# Patient Record
Sex: MALE | Race: WHITE | NOT HISPANIC OR LATINO | Employment: UNEMPLOYED | URBAN - METROPOLITAN AREA
[De-identification: names, ages, dates, MRNs, and addresses within clinical notes are randomized per-mention and may not be internally consistent; named-entity substitution may affect disease eponyms.]

---

## 2017-08-03 ENCOUNTER — DOCUMENTATION ONLY (OUTPATIENT)
Dept: FAMILY MEDICINE | Facility: CLINIC | Age: 62
End: 2017-08-03

## 2017-08-03 NOTE — PROGRESS NOTES
Pre-Visit Chart Review  For Appointment Scheduled on 08/04/2017      Health Maintenance Due   Topic Date Due    Zoster Vaccine  12/10/2013    Colonoscopy  01/28/2016    Lipid Panel  02/12/2017    Influenza Vaccine  08/01/2017

## 2017-08-04 ENCOUNTER — LAB VISIT (OUTPATIENT)
Dept: LAB | Facility: HOSPITAL | Age: 62
End: 2017-08-04
Attending: PHYSICIAN ASSISTANT
Payer: MEDICARE

## 2017-08-04 ENCOUNTER — OFFICE VISIT (OUTPATIENT)
Dept: FAMILY MEDICINE | Facility: CLINIC | Age: 62
End: 2017-08-04
Payer: MEDICARE

## 2017-08-04 VITALS
BODY MASS INDEX: 27.97 KG/M2 | WEIGHT: 199.75 LBS | TEMPERATURE: 98 F | SYSTOLIC BLOOD PRESSURE: 114 MMHG | HEIGHT: 71 IN | DIASTOLIC BLOOD PRESSURE: 70 MMHG | HEART RATE: 65 BPM

## 2017-08-04 DIAGNOSIS — M79.605 LEG PAIN, BILATERAL: ICD-10-CM

## 2017-08-04 DIAGNOSIS — Z12.5 PROSTATE CANCER SCREENING: ICD-10-CM

## 2017-08-04 DIAGNOSIS — Z95.5 S/P CORONARY ARTERY STENT PLACEMENT: ICD-10-CM

## 2017-08-04 DIAGNOSIS — M79.604 LEG PAIN, BILATERAL: ICD-10-CM

## 2017-08-04 DIAGNOSIS — I25.10 CORONARY ARTERY DISEASE INVOLVING NATIVE CORONARY ARTERY WITHOUT ANGINA PECTORIS, UNSPECIFIED WHETHER NATIVE OR TRANSPLANTED HEART: ICD-10-CM

## 2017-08-04 DIAGNOSIS — E78.00 HYPERCHOLESTEROLEMIA: ICD-10-CM

## 2017-08-04 DIAGNOSIS — E78.5 HYPERLIPIDEMIA, UNSPECIFIED HYPERLIPIDEMIA TYPE: Primary | ICD-10-CM

## 2017-08-04 DIAGNOSIS — M79.606 PAIN OF LOWER EXTREMITY, UNSPECIFIED LATERALITY: ICD-10-CM

## 2017-08-04 DIAGNOSIS — E78.5 HYPERLIPIDEMIA, UNSPECIFIED HYPERLIPIDEMIA TYPE: ICD-10-CM

## 2017-08-04 DIAGNOSIS — I10 ESSENTIAL HYPERTENSION: ICD-10-CM

## 2017-08-04 DIAGNOSIS — I73.9 PAD (PERIPHERAL ARTERY DISEASE): ICD-10-CM

## 2017-08-04 DIAGNOSIS — F17.200 TOBACCO DEPENDENCE: ICD-10-CM

## 2017-08-04 LAB
ALBUMIN SERPL BCP-MCNC: 3.2 G/DL
ALP SERPL-CCNC: 98 U/L
ALT SERPL W/O P-5'-P-CCNC: <5 U/L
ANION GAP SERPL CALC-SCNC: 9 MMOL/L
AST SERPL-CCNC: 13 U/L
BASOPHILS # BLD AUTO: 0.03 K/UL
BASOPHILS NFR BLD: 0.5 %
BILIRUB SERPL-MCNC: 0.3 MG/DL
BUN SERPL-MCNC: 9 MG/DL
CALCIUM SERPL-MCNC: 9.7 MG/DL
CHLORIDE SERPL-SCNC: 103 MMOL/L
CHOLEST/HDLC SERPL: 5.8 {RATIO}
CO2 SERPL-SCNC: 27 MMOL/L
COMPLEXED PSA SERPL-MCNC: 0.91 NG/ML
CREAT SERPL-MCNC: 0.8 MG/DL
DIFFERENTIAL METHOD: ABNORMAL
EOSINOPHIL # BLD AUTO: 0.1 K/UL
EOSINOPHIL NFR BLD: 1.7 %
ERYTHROCYTE [DISTWIDTH] IN BLOOD BY AUTOMATED COUNT: 13.9 %
EST. GFR  (AFRICAN AMERICAN): >60 ML/MIN/1.73 M^2
EST. GFR  (NON AFRICAN AMERICAN): >60 ML/MIN/1.73 M^2
GLUCOSE SERPL-MCNC: 87 MG/DL
HCT VFR BLD AUTO: 42.7 %
HDL/CHOLESTEROL RATIO: 17.1 %
HDLC SERPL-MCNC: 210 MG/DL
HDLC SERPL-MCNC: 36 MG/DL
HGB BLD-MCNC: 14.1 G/DL
LDLC SERPL CALC-MCNC: 138 MG/DL
LYMPHOCYTES # BLD AUTO: 1.5 K/UL
LYMPHOCYTES NFR BLD: 22.9 %
MCH RBC QN AUTO: 31.8 PG
MCHC RBC AUTO-ENTMCNC: 33 G/DL
MCV RBC AUTO: 96 FL
MONOCYTES # BLD AUTO: 0.6 K/UL
MONOCYTES NFR BLD: 9.1 %
NEUTROPHILS # BLD AUTO: 4.3 K/UL
NEUTROPHILS NFR BLD: 65.5 %
NONHDLC SERPL-MCNC: 174 MG/DL
PLATELET # BLD AUTO: 213 K/UL
PMV BLD AUTO: 10.2 FL
POTASSIUM SERPL-SCNC: 4.2 MMOL/L
PROT SERPL-MCNC: 7.8 G/DL
RBC # BLD AUTO: 4.44 M/UL
SODIUM SERPL-SCNC: 139 MMOL/L
TRIGL SERPL-MCNC: 180 MG/DL
WBC # BLD AUTO: 6.5 K/UL

## 2017-08-04 PROCEDURE — 3008F BODY MASS INDEX DOCD: CPT | Mod: ,,, | Performed by: PHYSICIAN ASSISTANT

## 2017-08-04 PROCEDURE — 36415 COLL VENOUS BLD VENIPUNCTURE: CPT | Mod: PO

## 2017-08-04 PROCEDURE — 84153 ASSAY OF PSA TOTAL: CPT

## 2017-08-04 PROCEDURE — 85025 COMPLETE CBC W/AUTO DIFF WBC: CPT

## 2017-08-04 PROCEDURE — 99214 OFFICE O/P EST MOD 30 MIN: CPT | Mod: S$PBB,,, | Performed by: PHYSICIAN ASSISTANT

## 2017-08-04 PROCEDURE — 80053 COMPREHEN METABOLIC PANEL: CPT

## 2017-08-04 PROCEDURE — 99999 PR PBB SHADOW E&M-EST. PATIENT-LVL III: CPT | Mod: PBBFAC,,, | Performed by: PHYSICIAN ASSISTANT

## 2017-08-04 PROCEDURE — 80061 LIPID PANEL: CPT

## 2017-08-04 RX ORDER — ASPIRIN 81 MG/1
81 TABLET ORAL DAILY
COMMUNITY

## 2017-08-04 RX ORDER — METOPROLOL TARTRATE 50 MG/1
50 TABLET ORAL 2 TIMES DAILY
Qty: 180 TABLET | Refills: 2 | Status: SHIPPED | OUTPATIENT
Start: 2017-08-04 | End: 2018-04-02 | Stop reason: HOSPADM

## 2017-08-04 RX ORDER — CLOPIDOGREL BISULFATE 75 MG/1
75 TABLET ORAL DAILY
Qty: 30 TABLET | Refills: 6 | Status: SHIPPED | OUTPATIENT
Start: 2017-08-04 | End: 2019-01-09 | Stop reason: SDUPTHER

## 2017-08-04 RX ORDER — LOVASTATIN 20 MG/1
20 TABLET ORAL NIGHTLY
Qty: 90 TABLET | Refills: 3 | Status: SHIPPED | OUTPATIENT
Start: 2017-08-04 | End: 2018-04-02 | Stop reason: ALTCHOICE

## 2017-08-04 NOTE — PROGRESS NOTES
Subjective:       Patient ID: Srinivasa Oliver is a 61 y.o. male.    Chief Complaint: Annual Exam and Medication Refill    Patient is here for annual exam and labs.  Patient is having trouble affording medications and has not been taking most of his medication or has been sharing with his wife.        Review of Systems   Constitutional: Negative for activity change, appetite change, chills, fatigue, fever and unexpected weight change.   HENT: Negative.    Eyes: Negative for photophobia, pain, discharge, redness and visual disturbance.   Respiratory: Negative for cough, chest tightness and shortness of breath.    Cardiovascular: Negative for chest pain, palpitations and leg swelling.   Gastrointestinal: Negative for abdominal distention, abdominal pain, blood in stool, constipation, diarrhea, nausea and vomiting.   Genitourinary: Negative for decreased urine volume, difficulty urinating, dysuria, frequency, hematuria and urgency.   Musculoskeletal: Negative for arthralgias, back pain, gait problem and myalgias.   Neurological: Negative for dizziness, weakness, light-headedness, numbness and headaches.       Objective:      Physical Exam   Constitutional: He is oriented to person, place, and time. He appears well-developed and well-nourished. No distress.   HENT:   Head: Normocephalic and atraumatic.   Eyes: Conjunctivae are normal. Pupils are equal, round, and reactive to light.   Neck: Normal range of motion. Neck supple. No JVD present. No thyromegaly present.   Cardiovascular: Normal rate and regular rhythm.  Exam reveals no gallop and no friction rub.    No murmur heard.  Pulmonary/Chest: Effort normal. No respiratory distress. He has no wheezes. He has no rales. He exhibits no tenderness.   Neurological: He is alert and oriented to person, place, and time.   Skin: He is not diaphoretic.       Assessment:       1. Hyperlipidemia, unspecified hyperlipidemia type    2. Essential hypertension    3. PAD (peripheral  artery disease)    4. Prostate cancer screening    5. Coronary artery disease involving native coronary artery without angina pectoris, unspecified whether native or transplanted heart    6. S/P coronary artery stent placement    7. Hypercholesterolemia    8. Tobacco dependence    9. Leg pain, bilateral    10. Pain of lower extremity, unspecified laterality        Plan:       Srinivasa was seen today for annual exam and medication refill.    Diagnoses and all orders for this visit:    Hyperlipidemia, unspecified hyperlipidemia type  -     Lipid panel; Future  -     lovastatin (MEVACOR) 20 MG tablet; Take 1 tablet (20 mg total) by mouth every evening.    Essential hypertension  -     CBC auto differential; Future  -     Comprehensive metabolic panel; Future  -     metoprolol tartrate (LOPRESSOR) 50 MG tablet; Take 1 tablet (50 mg total) by mouth 2 (two) times daily.  -     clopidogrel (PLAVIX) 75 mg tablet; Take 1 tablet (75 mg total) by mouth once daily.    PAD (peripheral artery disease)  -     clopidogrel (PLAVIX) 75 mg tablet; Take 1 tablet (75 mg total) by mouth once daily.    Prostate cancer screening  -     PSA, Screening; Future    Coronary artery disease involving native coronary artery without angina pectoris, unspecified whether native or transplanted heart  -     clopidogrel (PLAVIX) 75 mg tablet; Take 1 tablet (75 mg total) by mouth once daily.    S/P coronary artery stent placement  -     clopidogrel (PLAVIX) 75 mg tablet; Take 1 tablet (75 mg total) by mouth once daily.    Hypercholesterolemia  -     clopidogrel (PLAVIX) 75 mg tablet; Take 1 tablet (75 mg total) by mouth once daily.    Tobacco dependence  -     clopidogrel (PLAVIX) 75 mg tablet; Take 1 tablet (75 mg total) by mouth once daily.    Leg pain, bilateral  -     clopidogrel (PLAVIX) 75 mg tablet; Take 1 tablet (75 mg total) by mouth once daily.    Pain of lower extremity, unspecified laterality  -     clopidogrel (PLAVIX) 75 mg tablet; Take 1  tablet (75 mg total) by mouth once daily.    Patient readiness: acceptance and barriers:economic    During the course of the visit the patient was educated and counseled about the following:     Hypertension:   Dietary sodium restriction.    Goals: Hypertension: Reduce Blood Pressure    Did patient meet goals/outcomes: Yes    The following self management tools provided: declined    Patient Instructions (the written plan) was given to the patient/family.     Time spent with patient: 45 minutes

## 2017-08-07 RX ORDER — ESCITALOPRAM OXALATE 10 MG/1
10 TABLET ORAL DAILY
Qty: 90 TABLET | Refills: 3 | Status: SHIPPED | OUTPATIENT
Start: 2017-08-07 | End: 2019-01-09 | Stop reason: SDUPTHER

## 2018-01-30 DIAGNOSIS — I10 HYPERTENSION, UNSPECIFIED TYPE: Primary | ICD-10-CM

## 2018-04-02 ENCOUNTER — OFFICE VISIT (OUTPATIENT)
Dept: CARDIOLOGY | Facility: CLINIC | Age: 63
End: 2018-04-02
Payer: MEDICARE

## 2018-04-02 VITALS
WEIGHT: 205.25 LBS | BODY MASS INDEX: 28.73 KG/M2 | OXYGEN SATURATION: 91 % | SYSTOLIC BLOOD PRESSURE: 114 MMHG | DIASTOLIC BLOOD PRESSURE: 67 MMHG | RESPIRATION RATE: 16 BRPM | HEART RATE: 68 BPM | HEIGHT: 71 IN

## 2018-04-02 DIAGNOSIS — F17.200 TOBACCO DEPENDENCE: ICD-10-CM

## 2018-04-02 DIAGNOSIS — I73.9 PAD (PERIPHERAL ARTERY DISEASE): ICD-10-CM

## 2018-04-02 DIAGNOSIS — I10 HYPERTENSION, UNSPECIFIED TYPE: ICD-10-CM

## 2018-04-02 DIAGNOSIS — Z72.0 TOBACCO ABUSE: Primary | ICD-10-CM

## 2018-04-02 DIAGNOSIS — I25.10 CORONARY ARTERY DISEASE, ANGINA PRESENCE UNSPECIFIED, UNSPECIFIED VESSEL OR LESION TYPE, UNSPECIFIED WHETHER NATIVE OR TRANSPLANTED HEART: ICD-10-CM

## 2018-04-02 PROCEDURE — 99214 OFFICE O/P EST MOD 30 MIN: CPT | Mod: PBBFAC,PO,25 | Performed by: INTERNAL MEDICINE

## 2018-04-02 PROCEDURE — 93005 ELECTROCARDIOGRAM TRACING: CPT | Mod: PBBFAC,PO | Performed by: INTERNAL MEDICINE

## 2018-04-02 PROCEDURE — 99999 PR PBB SHADOW E&M-EST. PATIENT-LVL IV: CPT | Mod: PBBFAC,,, | Performed by: INTERNAL MEDICINE

## 2018-04-02 PROCEDURE — 99214 OFFICE O/P EST MOD 30 MIN: CPT | Mod: S$PBB,,, | Performed by: INTERNAL MEDICINE

## 2018-04-02 RX ORDER — METOPROLOL SUCCINATE 50 MG/1
50 TABLET, EXTENDED RELEASE ORAL DAILY
Qty: 90 TABLET | Refills: 11 | Status: SHIPPED | OUTPATIENT
Start: 2018-04-02 | End: 2019-01-09 | Stop reason: SDUPTHER

## 2018-04-02 RX ORDER — PRAVASTATIN SODIUM 20 MG/1
20 TABLET ORAL DAILY
Qty: 90 TABLET | Refills: 3 | Status: SHIPPED | OUTPATIENT
Start: 2018-04-02 | End: 2019-01-09 | Stop reason: ALTCHOICE

## 2018-04-02 NOTE — PROGRESS NOTES
[facility]    CC: Establish care  Chief Complaint   Patient presents with    Establish Care     former Dr. Hudson pt//LOV 11/2015       Patient ID: Srinivasa Oliver is a 62 y.o. male with a past medical history of hypertension, coronary artery disease, prior coronary artery bypass grafting, peripheral arterial disease, venous disease     HPI  Patient was here to establish care.  He has not seen a cardiologist since 2015.  He remains asymptomatic.  Denies any exertional chest pain, shortness of breath, orthopnea, PND, syncope or presyncope.  Unfortunately he continues to smoke    Past Medical History:   Diagnosis Date    Adenomatous colon polyp 1/28/2011    Adenomatous colon polyp     Anxiety     Blood transfusion     CHF (congestive heart failure)     Emphysema of lung     Hypertension     S/P coronary artery stent placement 1/7/2013    Staph skin infection     abd/ lanced x 2    Ulcer      Past Surgical History:   Procedure Laterality Date    COLONOSCOPY  1-    Dr Choudhary, tubular adenoma    CORONARY ARTERY BYPASS GRAFT      CORONARY STENT PLACEMENT       Social History     Social History    Marital status:      Spouse name: N/A    Number of children: N/A    Years of education: N/A     Occupational History    Not on file.     Social History Main Topics    Smoking status: Current Every Day Smoker     Packs/day: 0.75     Types: Cigarettes    Smokeless tobacco: Never Used      Comment: patient room 7     Alcohol use No    Drug use: No    Sexual activity: Not on file     Other Topics Concern    Not on file     Social History Narrative    No narrative on file     Family History   Problem Relation Age of Onset    Heart disease Mother     Heart disease Father     Diabetes Father     Heart disease Maternal Aunt     No Known Problems Sister     No Known Problems Brother     No Known Problems Daughter     No Known Problems Son     No Known Problems Paternal Aunt      No Known Problems Paternal Uncle     No Known Problems Maternal Grandmother     No Known Problems Maternal Grandfather     No Known Problems Paternal Grandmother     No Known Problems Paternal Grandfather        Review of patient's allergies indicates:   Allergen Reactions    Atorvastatin      Other reaction(s): Rash       Medication List with Changes/Refills   New Medications    METOPROLOL SUCCINATE (TOPROL-XL) 50 MG 24 HR TABLET    Take 1 tablet (50 mg total) by mouth once daily.    PRAVASTATIN (PRAVACHOL) 20 MG TABLET    Take 1 tablet (20 mg total) by mouth once daily.   Current Medications    ALBUTEROL (PROVENTIL HFA) 90 MCG/ACTUATION INHALER    Inhale 2 puffs into the lungs every 4 (four) hours as needed for Wheezing. Every 4-6 hours PRN    ASPIRIN (ECOTRIN) 81 MG EC TABLET    Take 81 mg by mouth once daily.    AZELASTINE (ASTEPRO) 137 MCG NASAL SPRAY    2 sprays by Nasal route 2 (two) times daily. Once to Twice a day    BUDESONIDE-FORMOTEROL 160-4.5 MCG (SYMBICORT) 160-4.5 MCG/ACTUATION HFAA    Inhale 2 puffs into the lungs every 12 (twelve) hours. Twice a day    CLOPIDOGREL (PLAVIX) 75 MG TABLET    Take 1 tablet (75 mg total) by mouth once daily.    COENZYME Q10-VITAMIN E (COQ10 ) 100-100 MG-UNIT CAP    Take 200 mg by mouth once daily. Every day    DOCOSAHEXANOIC ACID/EPA (FISH OIL ORAL)    Take 1 capsule by mouth once daily. Twice a day    ESCITALOPRAM OXALATE (LEXAPRO) 10 MG TABLET    Take 1 tablet (10 mg total) by mouth once daily.    MULTIVITAMIN (THERAGRAN) PER TABLET    Take 1 tablet by mouth once daily. Every day    SILDENAFIL (VIAGRA) 100 MG TABLET    Take 0.5 tablets (50 mg total) by mouth as needed for Erectile Dysfunction. As directed   Discontinued Medications    LOVASTATIN (MEVACOR) 20 MG TABLET    Take 1 tablet (20 mg total) by mouth every evening.    METOPROLOL TARTRATE (LOPRESSOR) 50 MG TABLET    Take 1 tablet (50 mg total) by mouth 2 (two) times daily.       Review of  "Systems  Constitution: Denies chills, fever, and sweats.  HENT: Denies headaches or blurry vision.  Cardiovascular: Denies chest pain or irregular heart beat.  Respiratory: Denies cough or shortness of breath.  Gastrointestinal: Denies abdominal pain, nausea, or vomiting.  Musculoskeletal: Denies muscle cramps.  Neurological: Denies dizziness or focal weakness.  Psychiatric/Behavioral: Normal mental status.  Hematologic/Lymphatic: Denies bleeding problem or easy bruising/bleeding.  Skin: Denies rash or suspicious lesions    Physical Examination  /67 (BP Location: Left arm, Patient Position: Sitting)   Pulse 68   Resp 16   Ht 5' 11" (1.803 m)   Wt 93.1 kg (205 lb 4 oz)   SpO2 (!) 91%   BMI 28.63 kg/m²     Constitutional: No acute distress, conversant  HEENT: Sclera anicteric, Pupils equal, round and reactive to light, extraocular motions intact, Oropharynx clear  Neck: No JVD, no carotid bruits  Cardiovascular: regular rate and rhythm, no murmur, rubs or gallops, normal S1/S2  Pulmonary: Clear to auscultation bilaterally  Abdominal: Abdomen soft, nontender, nondistended, positive bowel sounds  Extremities: No lower extremity edema,   Pulses:  Carotid pulses are 2+ on the right side, and 2+ on the left side.  Radial pulses are 2+ on the right side, and 2+ on the left side.   Femoral pulses are 2+ on the right side, and 2+ on the left side.    Skin: No ecchymosis, erythema, or ulcers  Psych: Alert and oriented x 3, appropriate affect  Neuro: CNII-XII intact, no focal deficits    Labs:  Most Recent Data  CBC:   Lab Results   Component Value Date    WBC 6.50 08/04/2017    HGB 14.1 08/04/2017    HCT 42.7 08/04/2017     08/04/2017    MCV 96 08/04/2017    RDW 13.9 08/04/2017     BMP:   Lab Results   Component Value Date     08/04/2017    K 4.2 08/04/2017     08/04/2017    CO2 27 08/04/2017    BUN 9 08/04/2017    CREATININE 0.8 08/04/2017    GLU 87 08/04/2017    CALCIUM 9.7 08/04/2017     LFTS; "   Lab Results   Component Value Date    PROT 7.8 08/04/2017    ALBUMIN 3.2 (L) 08/04/2017    BILITOT 0.3 08/04/2017    AST 13 08/04/2017    ALKPHOS 98 08/04/2017    ALT <5 (L) 08/04/2017     COAGS:   Lab Results   Component Value Date    INR 0.95 01/28/2013     FLP:   Lab Results   Component Value Date    CHOL 210 (H) 08/04/2017    HDL 36 (L) 08/04/2017    LDLCALC 138.0 08/04/2017    TRIG 180 (H) 08/04/2017    CHOLHDL 17.1 (L) 08/04/2017     CARDIAC: No results found for: TROPONINI, CKMB, BNP    Imaging:    EKG: Normal sinus rhythm.  No significant ST-T wave changes        I have personally reviewed these images and echo data    Assessment/Plan:  Srinivasa was seen today for establish care.    Diagnoses and all orders for this visit:    Tobacco abuse  -     Ambulatory referral to Smoking Cessation Program    Hypertension, unspecified type  -     EKG 12-lead    PAD (peripheral artery disease)    Coronary artery disease, angina presence unspecified, unspecified vessel or lesion type, unspecified whether native or transplanted heart    Tobacco dependence    Other orders  -     metoprolol succinate (TOPROL-XL) 50 MG 24 hr tablet; Take 1 tablet (50 mg total) by mouth once daily.  -     pravastatin (PRAVACHOL) 20 MG tablet; Take 1 tablet (20 mg total) by mouth once daily.      I reemphasized on the need to give up smoking.    He had some reaction to lovastatin.  He was not very clear about the specific .I have changed to pravastatin.  He told me he does not have any insurance hence would not be able to afford Crestor.  Recommendations regarding healthy lifestyle, diet and exercise given to patient    Follow-up in about 1 year (around 4/2/2019).          Total duration of face to face visit time 30 minutes.  Total time spent counseling greater than fifty percent of total visit time.  Counseling included discussion regarding imaging findings, diagnosis, possibilities, treatment options, risks and benefits.  The patient had  many questions regarding the options and long-term effect which were all answered to my best ability.      Facundo Wilburn MD,MRCP,RPVI,FACC,FSCAI.  Interventional Cardiology   Phone [phone]

## 2018-04-12 ENCOUNTER — CLINICAL SUPPORT (OUTPATIENT)
Dept: SMOKING CESSATION | Facility: CLINIC | Age: 63
End: 2018-04-12
Payer: COMMERCIAL

## 2018-04-12 DIAGNOSIS — F17.200 NICOTINE DEPENDENCE: Primary | ICD-10-CM

## 2018-04-12 PROCEDURE — 99999 PR PBB SHADOW E&M-EST. PATIENT-LVL II: CPT | Mod: PBBFAC,,,

## 2018-04-12 PROCEDURE — 99404 PREV MED CNSL INDIV APPRX 60: CPT | Mod: S$GLB,,,

## 2018-04-12 RX ORDER — IBUPROFEN 200 MG
1 TABLET ORAL DAILY
Qty: 14 PATCH | Refills: 0 | Status: SHIPPED | OUTPATIENT
Start: 2018-04-12 | End: 2018-04-19 | Stop reason: DRUGHIGH

## 2018-04-18 ENCOUNTER — CLINICAL SUPPORT (OUTPATIENT)
Dept: SMOKING CESSATION | Facility: CLINIC | Age: 63
End: 2018-04-18
Payer: COMMERCIAL

## 2018-04-18 DIAGNOSIS — F17.200 NICOTINE DEPENDENCE: Primary | ICD-10-CM

## 2018-04-18 PROCEDURE — 99999 PR PBB SHADOW E&M-EST. PATIENT-LVL I: CPT | Mod: PBBFAC,,,

## 2018-04-18 PROCEDURE — 90853 GROUP PSYCHOTHERAPY: CPT | Mod: S$GLB,,,

## 2018-04-18 NOTE — Clinical Note
Patient is smoking about 20 cigarettes per day. He states that he feels he needs more than 21 mg patch. He was smoking up to 2 packs per day, so I will increase dose to 21 mg x 2 patches for 2 weeks. He will work on a reduction plan as we discussed, and his wife is working to quit as well, she will be supportive. The patient will continue to come to the clinic for additional support and encouragement.

## 2018-04-19 RX ORDER — IBUPROFEN 200 MG
2 TABLET ORAL DAILY
Qty: 28 PATCH | Refills: 0 | Status: SHIPPED | OUTPATIENT
Start: 2018-04-19 | End: 2018-05-10 | Stop reason: SDUPTHER

## 2018-04-19 NOTE — PROGRESS NOTES
Site: [facility]  Date:  4/18/2018  Clinical Status of Patient: Outpatient   Length of Service and Code: 60 minutes - 16017   Number in Attendance: 5  Group Activities/Focus of Group:  orientation, client introductions, completion of TCRS (Tobacco Cessation Rating Scale) learned addiction model, cues/triggers, personal reasons for quitting, medications, goals, quit date    Target symptoms:  withdrawal and medication side effects             The following were rated moderate (3) to severe (4) on TCRS:       Moderate 3: Restless - discussed withdrawal and habit      Severe 4:   Desire or crave - discussed habit and withdrawal   Patient's Response to Intervention: Patient is smoking about 20 cigarettes per day. He states that he feels he needs more than 21 mg patch. He was smoking up to 2 packs per day, so I will increase dose to 21 mg x 2 patches for 2 weeks. He will work on a reduction plan as we discussed, and his wife is working to quit as well, she will be supportive. The patient will continue to come to the clinic for additional support and encouragement.      Progress Toward Goals and Other Mental Status Changes: The patient denies any abnormal behavioral or mental changes at this time.   Diagnosis: Z72.0  Plan: The patient will continue with group therapy sessions and medication monitoring by CTTS. Prescribed medication management will be by physician.   Return to Clinic: 1 week

## 2018-04-25 ENCOUNTER — CLINICAL SUPPORT (OUTPATIENT)
Dept: SMOKING CESSATION | Facility: CLINIC | Age: 63
End: 2018-04-25
Payer: COMMERCIAL

## 2018-04-25 DIAGNOSIS — F17.200 NICOTINE DEPENDENCE: Primary | ICD-10-CM

## 2018-04-25 PROCEDURE — 90853 GROUP PSYCHOTHERAPY: CPT | Mod: S$GLB,,,

## 2018-04-25 PROCEDURE — 99999 PR PBB SHADOW E&M-EST. PATIENT-LVL I: CPT | Mod: PBBFAC,,,

## 2018-04-25 NOTE — Clinical Note
Patient has made progress with reduction, he is smoking 15 cigarettes per day. Patient states that he ran out of patches, so he hasn't worn one for 2 days. I will call [facility] to check on the Rx that I entered last week. Patient was smoking over 2 packs per day, he is ready to quit for health reasons. We discussed adding Wellbutrin to further aid his quit. He will go back to using one 21 mg nicotine patch QD, which he tolerated well without any negative side effects. The patient will continue to come to the clinic for additional support and encouragement.

## 2018-04-26 RX ORDER — BUPROPION HYDROCHLORIDE 150 MG/1
150 TABLET, EXTENDED RELEASE ORAL 2 TIMES DAILY
Qty: 60 TABLET | Refills: 0 | Status: SHIPPED | OUTPATIENT
Start: 2018-04-26 | End: 2018-05-24 | Stop reason: SDUPTHER

## 2018-04-26 NOTE — PROGRESS NOTES
Smoking Cessation Group Session #2    Site: [facility]  Date:  4/25/2018  Clinical Status of Patient: Outpatient   Length of Service and Code: 60 minutes - 08447   Number in Attendance: 4  Group Activities/Focus of Group:  Sharing last weeks challenges, triggers, and coping activities to remain quit and/ or keep making progress toward cessation, completion of TCRS (Tobacco Cessation Rating Scale) learned addiction model, personal reasons for quitting, medications, goals, quit date.    Specific session focus: completion of TCRS (Tobacco Cessation Rating Scale) reviewed strategies, cues, and triggers. Introduced the negative impact of tobacco on health, the health advantages of discontinuing the use of tobacco, time line improved health changes after a quit, withdrawal issues to expect from nicotine and habit, and ways to achieve the goal of a quit.  Target symptoms:  withdrawal and medication side effects             The following were rated moderate (3) to severe (4) on TCRS:       Moderate 3: Desire or crave - discussed withdrawal and habit     Severe 4:   none  Patient's Response to Intervention: Patient has made progress with reduction, he is smoking 15 cigarettes per day. Patient states that he ran out of patches, so he hasn't worn one for 2 days. [facility] Rx that I entered last week. Patient was smoking over 2 packs per day, he is ready to quit for health reasons. We discussed adding Wellbutrin to further aid his quit. He will go back to using one 21 mg nicotine patch QD, which he tolerated well without any negative side effects. The patient will continue to come to the clinic for additional support and encouragement.     Progress Toward Goals and Other Mental Status Changes: The patient denies any abnormal behavioral or mental changes at this time.   Diagnosis: Z72.0  Plan: The patient will continue with group therapy sessions and medication monitoring by CTTS. Prescribed medication  management will be by physician.     Return to Clinic: 1 week    Quit Date:    Planned Quit Date:

## 2018-05-03 ENCOUNTER — CLINICAL SUPPORT (OUTPATIENT)
Dept: SMOKING CESSATION | Facility: CLINIC | Age: 63
End: 2018-05-03
Payer: COMMERCIAL

## 2018-05-03 DIAGNOSIS — F17.200 NICOTINE DEPENDENCE: Primary | ICD-10-CM

## 2018-05-03 PROCEDURE — 99999 PR PBB SHADOW E&M-EST. PATIENT-LVL I: CPT | Mod: PBBFAC,,,

## 2018-05-03 PROCEDURE — 99407 BEHAV CHNG SMOKING > 10 MIN: CPT | Mod: S$GLB,,,

## 2018-05-09 ENCOUNTER — CLINICAL SUPPORT (OUTPATIENT)
Dept: SMOKING CESSATION | Facility: CLINIC | Age: 63
End: 2018-05-09
Payer: COMMERCIAL

## 2018-05-09 DIAGNOSIS — F17.200 NICOTINE DEPENDENCE: Primary | ICD-10-CM

## 2018-05-09 PROCEDURE — 99999 PR PBB SHADOW E&M-EST. PATIENT-LVL I: CPT | Mod: PBBFAC,,,

## 2018-05-09 PROCEDURE — 90853 GROUP PSYCHOTHERAPY: CPT | Mod: S$GLB,,,

## 2018-05-09 NOTE — Clinical Note
Patient is smoking about 8 cigarettes per day. He has reduced down from over 2 packs per day. The patient remains on the prescribed tobacco cessation medication regimen of 150 mg Wellbutrin BID & 21 mg nicotine patch QD, without any negative side effects at this time. The patient will continue to come to the clinic for additional support and encouragement.

## 2018-05-10 RX ORDER — IBUPROFEN 200 MG
1 TABLET ORAL DAILY
Qty: 14 PATCH | Refills: 0 | Status: SHIPPED | OUTPATIENT
Start: 2018-05-10 | End: 2018-05-24 | Stop reason: SDUPTHER

## 2018-05-10 RX ORDER — IBUPROFEN 200 MG
1 TABLET ORAL DAILY
Qty: 14 PATCH | Refills: 0 | Status: SHIPPED | OUTPATIENT
Start: 2018-05-10 | End: 2018-05-10 | Stop reason: SDUPTHER

## 2018-05-10 NOTE — PROGRESS NOTES
Smoking Cessation Group Session #4    Site: [facility]  Date:  5/9/2018  Clinical Status of Patient: Outpatient   Length of Service and Code: 60 minutes - 90370   Number in Attendance: 3  Group Activities/Focus of Group:  Sharing last weeks challenges, triggers, and coping activities to remain quit and/ or keep making progress toward cessation, completion of TCRS (Tobacco Cessation Rating Scale) learned addiction model, personal reasons for quitting, medications, goals, quit date.    Specific session focus: completion of TCRS (Tobacco Cessation Rating Scale) reviewed strategies, habitual behavior, stress, and high risk situations. Introduced stress with addition interventions, SOLVE, relaxation with interventions, nutrition, exercise, weight gain, and the importance of rewarding oneself for accomplishments toward becoming tobacco free. Open discussion of all items with interventions.   Target symptoms:  withdrawal and medication side effects             The following were rated moderate (3) to severe (4) on TCRS:       Moderate 3:  Desire or crave      Severe 4:   none  Patient's Response to Intervention: Patient is smoking about 8 cigarettes per day. He has reduced down from over 2 packs per day. The patient remains on the prescribed tobacco cessation medication regimen of 150 mg Wellbutrin BID & 21 mg nicotine patch QD, without any negative side effects at this time. The patient will continue to come to the clinic for additional support and encouragement.   Progress Toward Goals and Other Mental Status Changes: The patient denies any abnormal behavioral or mental changes at this time.     Diagnosis: Z72.0  Plan: The patient will continue with group therapy sessions and medication monitoring by CTTS. Prescribed medication management will be by physician.     Return to Clinic: 1 week    Quit Date:    Planned Quit Date: discussed but yet set

## 2018-05-23 ENCOUNTER — CLINICAL SUPPORT (OUTPATIENT)
Dept: SMOKING CESSATION | Facility: CLINIC | Age: 63
End: 2018-05-23
Payer: COMMERCIAL

## 2018-05-23 DIAGNOSIS — F17.200 NICOTINE DEPENDENCE: Primary | ICD-10-CM

## 2018-05-23 PROCEDURE — 99999 PR PBB SHADOW E&M-EST. PATIENT-LVL I: CPT | Mod: PBBFAC,,,

## 2018-05-23 PROCEDURE — 90853 GROUP PSYCHOTHERAPY: CPT | Mod: S$GLB,,,

## 2018-05-23 NOTE — Clinical Note
Patient states that he is smoking about 10-15 cigarettes per day. He has made progress reducing down from 2 packs per day. He has expressed that he will try, yet is not going to guarantee a quit. We discussed his motivation for quitting and his health concerns. Patient is using strategies previously discussed. The patient remains on the prescribed tobacco cessation medication regimen of 150 mg Wellbutrin BID & 21 mg nicotine patch QD without any negative side effects at this time. The patient will continue to come to the clinic for additional support and encouragement.

## 2018-05-24 RX ORDER — BUPROPION HYDROCHLORIDE 150 MG/1
150 TABLET, EXTENDED RELEASE ORAL 2 TIMES DAILY
Qty: 60 TABLET | Refills: 0 | Status: SHIPPED | OUTPATIENT
Start: 2018-05-24 | End: 2018-07-12 | Stop reason: SDUPTHER

## 2018-05-24 RX ORDER — IBUPROFEN 200 MG
1 TABLET ORAL DAILY
Qty: 14 PATCH | Refills: 0 | Status: SHIPPED | OUTPATIENT
Start: 2018-05-24 | End: 2018-06-14 | Stop reason: SDUPTHER

## 2018-05-24 NOTE — PROGRESS NOTES
Smoking Cessation Group Session #6    Site: [facility]  Date:  5/23/2018  Clinical Status of Patient: Outpatient   Length of Service and Code: 60 minutes - 29387   Number in Attendance: 3  Group Activities/Focus of Group:  Sharing last weeks challenges, triggers, and coping activities to remain quit and/ or keep making progress toward cessation, completion of TCRS (Tobacco Cessation Rating Scale) learned addiction model, personal reasons for quitting, medications, goals, quit date.    Specific session focus: completion of TCRS (Tobacco Cessation Rating Scale) reviewed strategies, cues, triggers, high risk situations, lapses, relapses, diet, exercise, stress, relaxation, sleep, habitual behavior, and life style changes.  Target symptoms:  withdrawal and medication side effects             The following were rated moderate (3) to severe (4) on TCRS:       Moderate 3: desire or crave - discussed withdrawal and habit      Severe 4:   none  Patient's Response to Intervention: Patient states that he is smoking about 10-15 cigarettes per day. He has made progress reducing down from 2 packs per day. He has expressed that he will try, yet is not going to guarantee a quit. We discussed his motivation for quitting and his health concerns. Patient is using strategies previously discussed.  The patient remains on the prescribed tobacco cessation medication regimen of 150 mg Wellbutrin BID & 21 mg nicotine patch QD without any negative side effects at this time. The patient will continue to come to the clinic for additional support and encouragement.     Progress Toward Goals and Other Mental Status Changes: The patient denies any abnormal behavioral or mental changes at this time.     Diagnosis: Z72.0  Plan: The patient will continue with group therapy sessions and medication monitoring by CTTS. Prescribed medication management will be by physician.   Return to Clinic: 1 week    Quit Date:    Planned Quit Date: discussed but not yet  set

## 2018-05-30 ENCOUNTER — CLINICAL SUPPORT (OUTPATIENT)
Dept: SMOKING CESSATION | Facility: CLINIC | Age: 63
End: 2018-05-30
Payer: COMMERCIAL

## 2018-05-30 DIAGNOSIS — F17.200 NICOTINE DEPENDENCE: Primary | ICD-10-CM

## 2018-05-30 PROCEDURE — 90853 GROUP PSYCHOTHERAPY: CPT | Mod: S$GLB,,,

## 2018-05-30 PROCEDURE — 99999 PR PBB SHADOW E&M-EST. PATIENT-LVL I: CPT | Mod: PBBFAC,,,

## 2018-05-30 NOTE — Clinical Note
Patient reports that he has not smoked today at all. During the past week he smoked about 2-3 per day. Patient is using strategies previously discussed. The patient remains on the prescribed tobacco cessation medication regimen of 150  mg Wellbutrin BID, nicotine patch 21 mg QD, without any negative side effects at this time. The patient will continue to come to the clinic for additional support and encouragement.

## 2018-05-31 NOTE — PROGRESS NOTES
Site: [facility]  Date:  5/30/2018  Clinical Status of Patient: Outpatient   Length of Service and Code: 60 minutes - 30032   Number in Attendance: 4  Group Activities/Focus of Group:  orientation, client introductions, completion of TCRS (Tobacco Cessation Rating Scale) learned addiction model, cues/triggers, personal reasons for quitting, medications, goals, quit date    Target symptoms:  withdrawal and medication side effects             The following were rated moderate (3) to severe (4) on TCRS:       Moderate 3: none     Severe 4:   none  Patient's Response to Intervention: Patient reports that he has not smoked today at all. During the past week he smoked about 2-3 per day. Patient is using strategies previously discussed. The patient remains on the prescribed tobacco cessation medication regimen of 150  mg Wellbutrin BID, nicotine patch 21 mg QD, without any negative side effects at this time. The patient will continue to come to the clinic for additional support and encouragement.     Progress Toward Goals and Other Mental Status Changes: The patient denies any abnormal behavioral or mental changes at this time.   Diagnosis: Z72.0  Plan: The patient will continue with group therapy sessions and medication monitoring by CTTS. Prescribed medication management will be by physician.     Return to Clinic: 1 week

## 2018-06-06 DIAGNOSIS — I10 ESSENTIAL HYPERTENSION: ICD-10-CM

## 2018-06-07 RX ORDER — METOPROLOL TARTRATE 50 MG/1
TABLET ORAL
Qty: 180 TABLET | Refills: 2 | OUTPATIENT
Start: 2018-06-07

## 2018-06-13 ENCOUNTER — CLINICAL SUPPORT (OUTPATIENT)
Dept: SMOKING CESSATION | Facility: CLINIC | Age: 63
End: 2018-06-13
Payer: COMMERCIAL

## 2018-06-13 DIAGNOSIS — F17.200 NICOTINE DEPENDENCE: Primary | ICD-10-CM

## 2018-06-13 PROCEDURE — 99999 PR PBB SHADOW E&M-EST. PATIENT-LVL I: CPT | Mod: PBBFAC,,,

## 2018-06-13 PROCEDURE — 90853 GROUP PSYCHOTHERAPY: CPT | Mod: S$GLB,,,

## 2018-06-14 RX ORDER — IBUPROFEN 200 MG
1 TABLET ORAL DAILY
Qty: 14 PATCH | Refills: 0 | Status: SHIPPED | OUTPATIENT
Start: 2018-06-14 | End: 2019-01-09

## 2018-06-14 NOTE — PROGRESS NOTES
Smoking Cessation Group Session #3    Site: [facility]  Date:  6/13/2018  Clinical Status of Patient: Outpatient   Length of Service and Code: 60 minutes - 28582   Number in Attendance: 6  Group Activities/Focus of Group:  Sharing last weeks challenges, triggers, and coping activities to remain quit and/ or keep making progress toward cessation, completion of TCRS (Tobacco Cessation Rating Scale) learned addiction model, personal reasons for quitting, medications, goals, quit date.    Specific session focus: completion of TCRS (Tobacco Cessation Rating Scale) reviewed strategies, controlling environment, cues, triggers, new goals set. Introduced high risk situations with preparation interventions, caffeine similarities with withdrawal issues of habit and nicotine, Alcohol, Understanding urges, cravings, stress and relaxation. Open discussion with intervention discussion.  Target symptoms:  withdrawal and medication side effects             The following were rated moderate (3) to severe (4) on TCRS:       Moderate 3: none      Severe 4:   none  Patient's Response to Intervention: Patient remains tobacco free since 5/30/2018. Patient is using strategies previously discussed.  The patient remains on the prescribed tobacco cessation medication regimen of 21 mg nicotine patch QD & Wellbutrin 150 mg BId without any negative side effects at this time. The patient will continue to come to the clinic for additional support and encouragement.   Progress Toward Goals and Other Mental Status Changes: The patient denies any abnormal behavioral or mental changes at this time.   Diagnosis: Z72.0  Plan: The patient will continue with group therapy sessions and medication monitoring by CTTS. Prescribed medication management will be by physician.   Return to Clinic: 1 week    Quit Date: 5/30/2018   Planned Quit Date:

## 2018-06-18 ENCOUNTER — CLINICAL SUPPORT (OUTPATIENT)
Dept: SMOKING CESSATION | Facility: CLINIC | Age: 63
End: 2018-06-18
Payer: COMMERCIAL

## 2018-06-18 ENCOUNTER — TELEPHONE (OUTPATIENT)
Dept: SMOKING CESSATION | Facility: CLINIC | Age: 63
End: 2018-06-18

## 2018-06-18 DIAGNOSIS — F17.200 NICOTINE DEPENDENCE: Primary | ICD-10-CM

## 2018-06-18 PROCEDURE — 99407 BEHAV CHNG SMOKING > 10 MIN: CPT | Mod: S$GLB,,,

## 2018-06-18 NOTE — PROGRESS NOTES
Successful contact with patients wife (Beatris) regarding tobacco cessation quit #1. She states, he remains tobacco free since 5/31/2018; down from 1-2 packs/day. Commended him for the accomplishment, thus far. Pt have continue to follow with CTTS for group therapy. Current and next available  benefit status provided.  Will follow up with his progress in 3-4 months.

## 2018-07-12 DIAGNOSIS — F17.200 NICOTINE DEPENDENCE: ICD-10-CM

## 2018-07-12 RX ORDER — BUPROPION HYDROCHLORIDE 150 MG/1
TABLET, EXTENDED RELEASE ORAL
Qty: 60 TABLET | Refills: 0 | Status: SHIPPED | OUTPATIENT
Start: 2018-07-12 | End: 2019-01-09

## 2018-10-25 ENCOUNTER — TELEPHONE (OUTPATIENT)
Dept: SMOKING CESSATION | Facility: CLINIC | Age: 63
End: 2018-10-25

## 2018-11-02 ENCOUNTER — TELEPHONE (OUTPATIENT)
Dept: SMOKING CESSATION | Facility: CLINIC | Age: 63
End: 2018-11-02

## 2018-11-15 ENCOUNTER — TELEPHONE (OUTPATIENT)
Dept: SMOKING CESSATION | Facility: CLINIC | Age: 63
End: 2018-11-15

## 2018-12-10 ENCOUNTER — DOCUMENTATION ONLY (OUTPATIENT)
Dept: FAMILY MEDICINE | Facility: CLINIC | Age: 63
End: 2018-12-10

## 2018-12-10 NOTE — PROGRESS NOTES
Pre-Visit Chart Review  For Appointment Scheduled on 2-19-19    Health Maintenance Due   Topic Date Due    Colonoscopy  01/28/2016    Influenza Vaccine  08/01/2018    Lipid Panel  08/04/2018    Pneumococcal Vaccine (65+ Low/Medium Risk) (1 of 2 - PCV13) 12/10/2018    Abdominal Aortic Aneurysm Screening  12/10/2018

## 2019-01-09 ENCOUNTER — OFFICE VISIT (OUTPATIENT)
Dept: CARDIOLOGY | Facility: CLINIC | Age: 64
End: 2019-01-09
Payer: MEDICARE

## 2019-01-09 VITALS
WEIGHT: 220.69 LBS | BODY MASS INDEX: 30.9 KG/M2 | OXYGEN SATURATION: 91 % | HEART RATE: 56 BPM | HEIGHT: 71 IN | SYSTOLIC BLOOD PRESSURE: 125 MMHG | DIASTOLIC BLOOD PRESSURE: 73 MMHG

## 2019-01-09 DIAGNOSIS — R21 RASH: ICD-10-CM

## 2019-01-09 DIAGNOSIS — E65 ABDOMINAL OBESITY: ICD-10-CM

## 2019-01-09 DIAGNOSIS — F32.9 REACTIVE DEPRESSION: ICD-10-CM

## 2019-01-09 DIAGNOSIS — E78.00 PURE HYPERCHOLESTEROLEMIA: ICD-10-CM

## 2019-01-09 DIAGNOSIS — Z95.1 S/P CABG X 3: Primary | ICD-10-CM

## 2019-01-09 DIAGNOSIS — I73.9 PAD (PERIPHERAL ARTERY DISEASE): ICD-10-CM

## 2019-01-09 DIAGNOSIS — E78.00 HYPERCHOLESTEROLEMIA: ICD-10-CM

## 2019-01-09 DIAGNOSIS — E66.9 OBESITY (BMI 30.0-34.9): ICD-10-CM

## 2019-01-09 DIAGNOSIS — Z95.5 S/P CORONARY ARTERY STENT PLACEMENT: ICD-10-CM

## 2019-01-09 DIAGNOSIS — Z87.891 HISTORY OF SMOKING GREATER THAN 50 PACK YEARS: ICD-10-CM

## 2019-01-09 DIAGNOSIS — I10 ESSENTIAL HYPERTENSION: ICD-10-CM

## 2019-01-09 DIAGNOSIS — I25.10 CORONARY ARTERY DISEASE INVOLVING NATIVE CORONARY ARTERY WITHOUT ANGINA PECTORIS, UNSPECIFIED WHETHER NATIVE OR TRANSPLANTED HEART: ICD-10-CM

## 2019-01-09 PROCEDURE — 99214 OFFICE O/P EST MOD 30 MIN: CPT | Mod: PBBFAC,PO | Performed by: INTERNAL MEDICINE

## 2019-01-09 PROCEDURE — 99215 PR OFFICE/OUTPT VISIT, EST, LEVL V, 40-54 MIN: ICD-10-PCS | Mod: HCNC,S$GLB,, | Performed by: INTERNAL MEDICINE

## 2019-01-09 PROCEDURE — 99999 PR PBB SHADOW E&M-EST. PATIENT-LVL IV: CPT | Mod: PBBFAC,HCNC,, | Performed by: INTERNAL MEDICINE

## 2019-01-09 PROCEDURE — 99215 OFFICE O/P EST HI 40 MIN: CPT | Mod: HCNC,S$GLB,, | Performed by: INTERNAL MEDICINE

## 2019-01-09 PROCEDURE — 99999 PR PBB SHADOW E&M-EST. PATIENT-LVL IV: ICD-10-PCS | Mod: PBBFAC,HCNC,, | Performed by: INTERNAL MEDICINE

## 2019-01-09 RX ORDER — ROSUVASTATIN CALCIUM 20 MG/1
20 TABLET, COATED ORAL DAILY
Qty: 30 TABLET | Refills: 11 | Status: SHIPPED | OUTPATIENT
Start: 2019-01-09 | End: 2020-03-16

## 2019-01-09 RX ORDER — CLOPIDOGREL BISULFATE 75 MG/1
75 TABLET ORAL DAILY
Qty: 90 TABLET | Refills: 3 | Status: SHIPPED | OUTPATIENT
Start: 2019-01-09 | End: 2020-01-23

## 2019-01-09 RX ORDER — METOPROLOL SUCCINATE 50 MG/1
50 TABLET, EXTENDED RELEASE ORAL DAILY
Qty: 90 TABLET | Refills: 3 | Status: SHIPPED | OUTPATIENT
Start: 2019-01-09 | End: 2020-03-16

## 2019-01-09 RX ORDER — ESCITALOPRAM OXALATE 10 MG/1
10 TABLET ORAL DAILY
Qty: 90 TABLET | Refills: 3 | Status: SHIPPED | OUTPATIENT
Start: 2019-01-09 | End: 2020-03-30 | Stop reason: SDUPTHER

## 2019-01-09 NOTE — PROGRESS NOTES
Subjective:    Patient ID:  Srinivasa Oliver is a 63 y.o. male who presents for evaluation of Establish Care  For CAD post CABG and PCI, PAD, HLD, need medication refills  PCP: Dr. Womack  Prior cardiologist: Dr. Wilburn, last seen 4/2018  Lives with wife, Beatris, had quit smoking together in 5/2018.  Retired outfitter for [employer]    Patient is a new patient to me.    White male with a past medical history of hypertension, coronary artery disease, prior coronary artery bypass grafting, peripheral arterial disease, venous disease, here for follow up. No heart worries, no CP nor SOB. ECG 4/2018, normal. No recent stress test, none after CABG. Last procedure with Dr. Hudson in 1/2013. - SUMMARY: 1. Successful atherectomy of the distal right SFA and proximal right popliteal A. LDL in 8/2017 was 138, baseline possible 152 on 20 mg of Pravastatin. Goal of LDL is < 70.        Review of Systems   Constitution: Positive for weight gain (15 lbs since 4/2018, due to quitting smoking). Negative for diaphoresis, fever, weakness, malaise/fatigue and night sweats.   HENT: Positive for hearing loss (bilateral). Negative for nosebleeds and tinnitus.    Eyes: Negative for visual disturbance.   Cardiovascular: Negative for chest pain, claudication, cyanosis, dyspnea on exertion, irregular heartbeat, leg swelling, near-syncope, orthopnea, palpitations and paroxysmal nocturnal dyspnea.   Respiratory: Positive for sleep disturbances due to breathing. Negative for cough, shortness of breath, snoring and wheezing.         Edward score 3   Endocrine: Negative for polydipsia and polyuria.   Hematologic/Lymphatic: Does not bruise/bleed easily.   Skin: Positive for rash (Tinea corpus). Negative for color change, flushing, nail changes, poor wound healing and suspicious lesions.   Musculoskeletal: Positive for back pain. Negative for arthritis, falls, gout, joint pain, joint swelling, muscle cramps, muscle weakness and myalgias.  "  Gastrointestinal: Positive for diarrhea. Negative for heartburn, hematemesis, hematochezia, melena and nausea.   Neurological: Negative for disturbances in coordination, excessive daytime sleepiness, dizziness, focal weakness, headaches, light-headedness, loss of balance, numbness and vertigo.   Psychiatric/Behavioral: Positive for depression. Negative for substance abuse. The patient does not have insomnia and is not nervous/anxious.         Objective:    Physical Exam   Constitutional: He is oriented to person, place, and time. He appears well-developed and well-nourished.   HENT:   Head: Normocephalic.   Eyes: Conjunctivae and EOM are normal. Pupils are equal, round, and reactive to light.   Neck: Normal range of motion. Neck supple. No JVD present. No thyromegaly present.   circumference 15"   Cardiovascular: Normal rate, regular rhythm and intact distal pulses. Exam reveals distant heart sounds. Exam reveals no gallop and no friction rub.   No murmur heard.  Pulses:       Carotid pulses are 1+ on the right side, and 1+ on the left side.       Radial pulses are 1+ on the right side, and 1+ on the left side.        Femoral pulses are 1+ on the right side, and 1+ on the left side.       Popliteal pulses are 1+ on the right side, and 1+ on the left side.        Dorsalis pedis pulses are 1+ on the right side, and 1+ on the left side.        Posterior tibial pulses are 1+ on the right side, and 1+ on the left side.   Pulmonary/Chest: Effort normal and breath sounds normal. He has no rales. He exhibits no tenderness.   Abdominal: Soft. Bowel sounds are normal. There is no tenderness.   Waist 51", hip 50"   Musculoskeletal: Normal range of motion. He exhibits no edema.   Lymphadenopathy:     He has no cervical adenopathy.   Neurological: He is alert and oriented to person, place, and time.   Skin: Skin is warm and dry. No rash noted.         Assessment:       1. S/P CABG x 3, 2007    2. History of smoking greater " than 50 pack years, quit 5/2018, close to 100 pack-year    3. Obesity (BMI 30.0-34.9), today 30.7    4. Abdominal obesity    5. Essential hypertension    6. PAD (peripheral artery disease)    7. Pure hypercholesterolemia    8. Rash    9. Coronary artery disease involving native coronary artery without angina pectoris, unspecified whether native or transplanted heart    10. S/P coronary artery stent placement    11. Hypercholesterolemia    12. Reactive depression         Plan:       Srinivasa was seen today for establish care.    Diagnoses and all orders for this visit:    S/P CABG x 3, 2007  -     NM Myocardial Perfusion Spect Multi Pharmacologic; Future  -     NM Multi Study RX Stress Card Component (BR); Future  -     Stress test (Cupid Only); Future  -     Transthoracic echo (TTE) complete (Cupid Only); Future  -     Lipid panel; Future  -     High sensitivity CRP (Cardiac CRP); Future  -     rosuvastatin (CRESTOR) 20 MG tablet; Take 1 tablet (20 mg total) by mouth once daily.    History of smoking greater than 50 pack years, quit 5/2018, close to 100 pack-year    Obesity (BMI 30.0-34.9), today 30.7    Abdominal obesity    Essential hypertension  -     Transthoracic echo (TTE) complete (Cupid Only); Future  -     metoprolol succinate (TOPROL-XL) 50 MG 24 hr tablet; Take 1 tablet (50 mg total) by mouth once daily.  -     clopidogrel (PLAVIX) 75 mg tablet; Take 1 tablet (75 mg total) by mouth once daily.    PAD (peripheral artery disease)  -     NM Myocardial Perfusion Spect Multi Pharmacologic; Future  -     NM Multi Study RX Stress Card Component (BR); Future  -     Stress test (Cupid Only); Future  -     Lipid panel; Future  -     High sensitivity CRP (Cardiac CRP); Future  -     rosuvastatin (CRESTOR) 20 MG tablet; Take 1 tablet (20 mg total) by mouth once daily.  -     clopidogrel (PLAVIX) 75 mg tablet; Take 1 tablet (75 mg total) by mouth once daily.    Pure hypercholesterolemia  -     Lipid panel; Future  -      rosuvastatin (CRESTOR) 20 MG tablet; Take 1 tablet (20 mg total) by mouth once daily.    Rash    Coronary artery disease involving native coronary artery without angina pectoris, unspecified whether native or transplanted heart  -     rosuvastatin (CRESTOR) 20 MG tablet; Take 1 tablet (20 mg total) by mouth once daily.  -     clopidogrel (PLAVIX) 75 mg tablet; Take 1 tablet (75 mg total) by mouth once daily.    S/P coronary artery stent placement  -     rosuvastatin (CRESTOR) 20 MG tablet; Take 1 tablet (20 mg total) by mouth once daily.  -     clopidogrel (PLAVIX) 75 mg tablet; Take 1 tablet (75 mg total) by mouth once daily.    Hypercholesterolemia  -     clopidogrel (PLAVIX) 75 mg tablet; Take 1 tablet (75 mg total) by mouth once daily.    Reactive depression  -     escitalopram oxalate (LEXAPRO) 10 MG tablet; Take 1 tablet (10 mg total) by mouth once daily.    - All medical issues reviewed, continue current Rx.  - Will need more aggressive statin Rx  - CV status stable, continue current Rx except change to high-intensity statin, all medications reviewed, patient acknowledge good understanding.  - Instruction for Mediterranean diet and heart healthy exercise given.  - Check home blood pressure, 2 days weekly, do 2 readings within 5 minutes in AM and PM, keep log for review.  - Weigh twice weekly, try to lose 1-2 lbs per week  - Highly recommend 30 minutes of exercise daily, can have Sunday off, with 2-3 sessions of muscle strengthening weekly. A  would be very helpful.  - Recommend at least annual cardiovascular evaluation in view of his significant risk factors.  - No [facility], will phone review      Total face-to-face time with the patient was 40 minutes and greater than 50% was spent in counseling and coordination of care. The above assessment and plan have been discussed at length.Prior physician's note reviewed. Labs and procedure over the last 6 months reviewed. Problem List updated. Asked  to bring in all active medications / pills bottles with next visit.

## 2019-01-09 NOTE — PATIENT INSTRUCTIONS

## 2019-01-15 ENCOUNTER — HOSPITAL ENCOUNTER (OUTPATIENT)
Dept: RADIOLOGY | Facility: HOSPITAL | Age: 64
Discharge: HOME | End: 2019-01-15
Attending: INTERNAL MEDICINE
Payer: MEDICARE

## 2019-01-15 ENCOUNTER — HOSPITAL ENCOUNTER (OUTPATIENT)
Dept: CARDIOLOGY | Facility: HOSPITAL | Age: 64
Discharge: HOME | End: 2019-01-15
Attending: INTERNAL MEDICINE
Payer: MEDICARE

## 2019-01-15 VITALS
HEIGHT: 71 IN | BODY MASS INDEX: 30.8 KG/M2 | SYSTOLIC BLOOD PRESSURE: 112 MMHG | WEIGHT: 220 LBS | DIASTOLIC BLOOD PRESSURE: 47 MMHG

## 2019-01-15 VITALS — HEART RATE: 61 BPM | DIASTOLIC BLOOD PRESSURE: 56 MMHG | SYSTOLIC BLOOD PRESSURE: 105 MMHG

## 2019-01-15 DIAGNOSIS — Z95.1 S/P CABG X 3: ICD-10-CM

## 2019-01-15 DIAGNOSIS — I73.9 PAD (PERIPHERAL ARTERY DISEASE): ICD-10-CM

## 2019-01-15 DIAGNOSIS — I10 ESSENTIAL HYPERTENSION: ICD-10-CM

## 2019-01-15 PROCEDURE — 78452 NM MYOCARDIAL PERFUSION SPECT MULTI PHARM: ICD-10-PCS | Mod: 26,,, | Performed by: RADIOLOGY

## 2019-01-15 PROCEDURE — 63600175 PHARM REV CODE 636 W HCPCS

## 2019-01-15 PROCEDURE — A9502 TC99M TETROFOSMIN: HCPCS

## 2019-01-15 PROCEDURE — 93017 CV STRESS TEST TRACING ONLY: CPT

## 2019-01-15 PROCEDURE — 93306 TTE W/DOPPLER COMPLETE: CPT

## 2019-01-15 PROCEDURE — 78452 HT MUSCLE IMAGE SPECT MULT: CPT | Mod: 26,,, | Performed by: RADIOLOGY

## 2019-01-15 RX ORDER — REGADENOSON 0.08 MG/ML
INJECTION, SOLUTION INTRAVENOUS
Status: COMPLETED
Start: 2019-01-15 | End: 2019-01-15

## 2019-01-15 RX ORDER — REGADENOSON 0.08 MG/ML
0.4 INJECTION, SOLUTION INTRAVENOUS ONCE
Status: COMPLETED | OUTPATIENT
Start: 2019-01-15 | End: 2019-01-15

## 2019-01-15 RX ADMIN — REGADENOSON 0.4 MG: 0.08 INJECTION, SOLUTION INTRAVENOUS at 07:01

## 2019-01-16 LAB
CV STRESS BASE HR: 62
DIASTOLIC BLOOD PRESSURE: 56
OHS CV CPX 85 PERCENT MAX PREDICTED HEART RATE MALE: 132
OHS CV CPX MAX PREDICTED HEART RATE: 155
OHS CV CPX PATIENT IS FEMALE: 0
OHS CV CPX PATIENT IS MALE: 1
OHS CV CPX PEAK DIASTOLIC BLOOD PRESSURE: 47 MMHG
OHS CV CPX PEAK HEAR RATE: 107
OHS CV CPX PEAK RATE PRESSURE PRODUCT: NORMAL
OHS CV CPX PEAK SYSTOLIC BLOOD PRESSURE: 112
OHS CV CPX PERCENT MAX PREDICTED HEART RATE ACHIEVED: 69
OHS CV CPX RATE PRESSURE PRODUCT PRESENTING: 6510
STRESS ECHO POST EXERCISE DUR MIN: 1 MIN
STRESS ECHO POST EXERCISE DUR SEC: 23
SYSTOLIC BLOOD PRESSURE: 105

## 2019-01-18 LAB
AORTIC ROOT ANNULUS: 3.05 CM
AORTIC VALVE CUSP SEPERATION: 2.23 CM
AV INDEX (PROSTH): 0.8
AV MEAN GRADIENT: 4.95 MMHG
AV PEAK GRADIENT: 8.64 MMHG
AV VALVE AREA: 3.13 CM2
BSA FOR ECHO PROCEDURE: 2.24 M2
CV ECHO LV RWT: 0.27 CM
DOP CALC AO PEAK VEL: 1.47 M/S
DOP CALC AO VTI: 32.81 CM
DOP CALC LVOT AREA: 3.9 CM2
DOP CALC LVOT DIAMETER: 2.23 CM
DOP CALC LVOT STROKE VOLUME: 102.67 CM3
DOP CALCLVOT PEAK VEL VTI: 26.3 CM
E WAVE DECELERATION TIME: 240.82 MSEC
E/A RATIO: 0.72
E/E' RATIO: 6.6
ECHO LV POSTERIOR WALL: 0.75 CM (ref 0.6–1.1)
FRACTIONAL SHORTENING: 11 % (ref 28–44)
INTERVENTRICULAR SEPTUM: 0.73 CM (ref 0.6–1.1)
IVRT: 0.12 MSEC
LEFT ATRIUM SIZE: 4.53 CM
LEFT INTERNAL DIMENSION IN SYSTOLE: 4.9 CM (ref 2.1–4)
LEFT VENTRICLE MASS INDEX: 66.1 G/M2
LEFT VENTRICULAR INTERNAL DIMENSION IN DIASTOLE: 5.5 CM (ref 3.5–6)
LEFT VENTRICULAR MASS: 145.11 G
LV LATERAL E/E' RATIO: 6
LV SEPTAL E/E' RATIO: 7.33
MV PEAK A VEL: 0.92 M/S
MV PEAK E VEL: 0.66 M/S
PISA TR MAX VEL: 2.66 M/S
PULM VEIN A" WAVE DURATION": 88 MSEC
PV PEAK VELOCITY: 1.01 CM/S
RA PRESSURE: 8 MMHG
RIGHT VENTRICULAR END-DIASTOLIC DIMENSION: 3.26 CM
TDI LATERAL: 0.11
TDI SEPTAL: 0.09
TDI: 0.1
TR MAX PG: 28.3 MMHG
TRICUSPID ANNULAR PLANE SYSTOLIC EXCURSION: 2.97 CM
TV REST PULMONARY ARTERY PRESSURE: 36 MMHG

## 2019-01-23 ENCOUNTER — TELEPHONE (OUTPATIENT)
Dept: CARDIOLOGY | Facility: CLINIC | Age: 64
End: 2019-01-23

## 2019-01-23 NOTE — TELEPHONE ENCOUNTER
----- Message from aHrvinder Mcneill MD sent at 1/18/2019  9:35 AM CST -----  No [facility], please phone review, result is OK, continue to Recommend healthy living: stop smoking, healthy diet and regular exercise, and weight control, thanks, Dr. Mcneill

## 2019-02-11 ENCOUNTER — PATIENT OUTREACH (OUTPATIENT)
Dept: ADMINISTRATIVE | Facility: HOSPITAL | Age: 64
End: 2019-02-11

## 2019-02-11 DIAGNOSIS — Z13.6 SCREENING FOR AAA (ABDOMINAL AORTIC ANEURYSM): Primary | ICD-10-CM

## 2019-02-11 NOTE — LETTER
February 11, 2019    Srinivasa Oliver  [address] [place] [address]             [facility]  [address]  [facility] [address]  Phone: [phone] Dear [facility] is committed to your overall health and would like to ensure that you are up to date on your recommended test and/or procedures.   Genaro Womack MD  has found that your chart shows you may be due for the following:    LIPID PANEL  COLORECTAL CANCER SCREENING  ABDOMINAL AORTA ANEURYSM SCREENING      If you have had any of the above done at another facility, please let us know so that we may obtain copies from that facility.  If you have a copy of these records, please provide a copy for us to scan into your chart.  You are welcome to request that the report be faxed to us at  ([phone]).     Otherwise, please schedule these appointments at your earliest convenience by calling [phone] or going to [email or web address].    If you have an upcoming scheduled appointment for the item above, please disregard this letter.    Sincerely,  [facility]  MD Nicole Jaffe LPN Clinical Care Coordinator  [facility]  [address] [place] [address]  Phone [phone]  Fax [phone]

## 2019-02-25 ENCOUNTER — OFFICE VISIT (OUTPATIENT)
Dept: FAMILY MEDICINE | Facility: CLINIC | Age: 64
End: 2019-02-25
Attending: FAMILY MEDICINE
Payer: MEDICARE

## 2019-02-25 VITALS
HEART RATE: 64 BPM | BODY MASS INDEX: 31.26 KG/M2 | HEIGHT: 71 IN | SYSTOLIC BLOOD PRESSURE: 132 MMHG | TEMPERATURE: 98 F | WEIGHT: 223.31 LBS | RESPIRATION RATE: 24 BRPM | DIASTOLIC BLOOD PRESSURE: 60 MMHG

## 2019-02-25 DIAGNOSIS — R10.9 ABDOMINAL PAIN, UNSPECIFIED ABDOMINAL LOCATION: ICD-10-CM

## 2019-02-25 DIAGNOSIS — Z95.1 S/P CABG X 3: ICD-10-CM

## 2019-02-25 DIAGNOSIS — Z00.00 ENCOUNTER FOR PREVENTIVE HEALTH EXAMINATION: Primary | ICD-10-CM

## 2019-02-25 DIAGNOSIS — I73.9 PAD (PERIPHERAL ARTERY DISEASE): ICD-10-CM

## 2019-02-25 DIAGNOSIS — I10 ESSENTIAL HYPERTENSION: ICD-10-CM

## 2019-02-25 DIAGNOSIS — Z23 IMMUNIZATION DUE: ICD-10-CM

## 2019-02-25 DIAGNOSIS — I25.10 CORONARY ARTERY DISEASE, ANGINA PRESENCE UNSPECIFIED, UNSPECIFIED VESSEL OR LESION TYPE, UNSPECIFIED WHETHER NATIVE OR TRANSPLANTED HEART: ICD-10-CM

## 2019-02-25 DIAGNOSIS — Z12.11 COLON CANCER SCREENING: ICD-10-CM

## 2019-02-25 DIAGNOSIS — R53.83 FATIGUE, UNSPECIFIED TYPE: ICD-10-CM

## 2019-02-25 DIAGNOSIS — E78.5 HYPERLIPIDEMIA, UNSPECIFIED HYPERLIPIDEMIA TYPE: ICD-10-CM

## 2019-02-25 DIAGNOSIS — Z13.6 ENCOUNTER FOR ABDOMINAL AORTIC ANEURYSM (AAA) SCREENING: ICD-10-CM

## 2019-02-25 DIAGNOSIS — G47.00 INSOMNIA, UNSPECIFIED TYPE: ICD-10-CM

## 2019-02-25 PROCEDURE — 1101F PT FALLS ASSESS-DOCD LE1/YR: CPT | Mod: HCNC,CPTII,S$GLB, | Performed by: FAMILY MEDICINE

## 2019-02-25 PROCEDURE — 3008F BODY MASS INDEX DOCD: CPT | Mod: HCNC,CPTII,S$GLB, | Performed by: FAMILY MEDICINE

## 2019-02-25 PROCEDURE — 99387 PR PREVENTIVE VISIT,NEW,65 & OVER: ICD-10-PCS | Mod: HCNC,25,S$GLB, | Performed by: FAMILY MEDICINE

## 2019-02-25 PROCEDURE — 1101F PR PT FALLS ASSESS DOC 0-1 FALLS W/OUT INJ PAST YR: ICD-10-PCS | Mod: HCNC,CPTII,S$GLB, | Performed by: FAMILY MEDICINE

## 2019-02-25 PROCEDURE — 3075F SYST BP GE 130 - 139MM HG: CPT | Mod: HCNC,CPTII,S$GLB, | Performed by: FAMILY MEDICINE

## 2019-02-25 PROCEDURE — 90662 FLU VACCINE - HIGH DOSE (65+) PRESERVATIVE FREE IM: ICD-10-PCS | Mod: HCNC,S$GLB,, | Performed by: FAMILY MEDICINE

## 2019-02-25 PROCEDURE — 3078F PR MOST RECENT DIASTOLIC BLOOD PRESSURE < 80 MM HG: ICD-10-PCS | Mod: HCNC,CPTII,S$GLB, | Performed by: FAMILY MEDICINE

## 2019-02-25 PROCEDURE — 99999 PR PBB SHADOW E&M-EST. PATIENT-LVL IV: CPT | Mod: PBBFAC,HCNC,, | Performed by: FAMILY MEDICINE

## 2019-02-25 PROCEDURE — 90670 PNEUMOCOCCAL CONJUGATE VACCINE 13-VALENT LESS THAN 5YO & GREATER THAN: ICD-10-PCS | Mod: HCNC,S$GLB,, | Performed by: FAMILY MEDICINE

## 2019-02-25 PROCEDURE — G0008 ADMIN INFLUENZA VIRUS VAC: HCPCS | Mod: HCNC,S$GLB,, | Performed by: FAMILY MEDICINE

## 2019-02-25 PROCEDURE — G0008 FLU VACCINE - HIGH DOSE (65+) PRESERVATIVE FREE IM: ICD-10-PCS | Mod: HCNC,S$GLB,, | Performed by: FAMILY MEDICINE

## 2019-02-25 PROCEDURE — 3008F PR BODY MASS INDEX (BMI) DOCUMENTED: ICD-10-PCS | Mod: HCNC,CPTII,S$GLB, | Performed by: FAMILY MEDICINE

## 2019-02-25 PROCEDURE — 90662 IIV NO PRSV INCREASED AG IM: CPT | Mod: HCNC,S$GLB,, | Performed by: FAMILY MEDICINE

## 2019-02-25 PROCEDURE — G0009 PNEUMOCOCCAL CONJUGATE VACCINE 13-VALENT LESS THAN 5YO & GREATER THAN: ICD-10-PCS | Mod: HCNC,S$GLB,, | Performed by: FAMILY MEDICINE

## 2019-02-25 PROCEDURE — 99387 INIT PM E/M NEW PAT 65+ YRS: CPT | Mod: HCNC,25,S$GLB, | Performed by: FAMILY MEDICINE

## 2019-02-25 PROCEDURE — 3078F DIAST BP <80 MM HG: CPT | Mod: HCNC,CPTII,S$GLB, | Performed by: FAMILY MEDICINE

## 2019-02-25 PROCEDURE — 99999 PR PBB SHADOW E&M-EST. PATIENT-LVL IV: ICD-10-PCS | Mod: PBBFAC,HCNC,, | Performed by: FAMILY MEDICINE

## 2019-02-25 PROCEDURE — 90670 PCV13 VACCINE IM: CPT | Mod: HCNC,S$GLB,, | Performed by: FAMILY MEDICINE

## 2019-02-25 PROCEDURE — 3075F PR MOST RECENT SYSTOLIC BLOOD PRESS GE 130-139MM HG: ICD-10-PCS | Mod: HCNC,CPTII,S$GLB, | Performed by: FAMILY MEDICINE

## 2019-02-25 PROCEDURE — G0009 ADMIN PNEUMOCOCCAL VACCINE: HCPCS | Mod: HCNC,S$GLB,, | Performed by: FAMILY MEDICINE

## 2019-02-25 RX ORDER — TRAZODONE HYDROCHLORIDE 50 MG/1
50 TABLET ORAL NIGHTLY
Qty: 30 TABLET | Refills: 5 | Status: SHIPPED | OUTPATIENT
Start: 2019-02-25 | End: 2020-01-23

## 2019-02-25 NOTE — PROGRESS NOTES
"Subjective:       Patient ID: Srinivasa Oliver is a 63 y.o. male.    Chief Complaint: Annual Exam      63-year-old male comes in for general checkup with some problems.  He has not been sleeping well associated with some anxiety about his home which is over run with termites.  About a year ago he was put on chantix to help stop smoking.  He developed a rash and when to apparently a dermatologist down the road where he was given "a white pill" that "made me feel young again".  He wants that pill.  He ordered some dopamine precursor apparently off the Internet and took that expecting it to make him feel better and had no relief at all.  He is staying up late getting up early and is not getting adequate rest.  I suspect the white pill is looking for was actually a steroid.  He is due for a AAA screen, flu vaccine, and a Prevnar 13. He is due for his colonoscopy and will go to see Dr. Choudhary for that.  He is due for a lipid panel and is not fasting today.    Past Medical History:  1/28/2011: Adenomatous colon polyp  No date: Adenomatous colon polyp  No date: Anxiety  No date: Blood transfusion  No date: CHF (congestive heart failure)  No date: Emphysema of lung  No date: Hypertension  1/7/2013: S/P coronary artery stent placement  No date: Staph skin infection      Comment:  abd/ lanced x 2  No date: Ulcer    Past Surgical History:  1-: COLONOSCOPY      Comment:  Dr Choudhary, tubular adenoma  No date: CORONARY ARTERY BYPASS GRAFT  No date: CORONARY STENT PLACEMENT    Review of patient's family history indicates:  Problem: Heart disease      Relation: Mother          Age of Onset: (Not Specified)  Problem: Heart disease      Relation: Father          Age of Onset: (Not Specified)  Problem: Diabetes      Relation: Father          Age of Onset: (Not Specified)  Problem: Heart disease      Relation: Maternal Aunt          Age of Onset: (Not Specified)  Problem: No Known Problems      Relation: Sister          Age " of Onset: (Not Specified)  Problem: No Known Problems      Relation: Brother          Age of Onset: (Not Specified)  Problem: No Known Problems      Relation: Daughter          Age of Onset: (Not Specified)  Problem: No Known Problems      Relation: Son          Age of Onset: (Not Specified)    Social History    Tobacco Use      Smoking status: Former Smoker        Packs/day: 0.75        Types: Cigarettes        Quit date: 2018        Years since quittin.7      Smokeless tobacco: Never Used      Tobacco comment: patient room 7     Alcohol use: No      Alcohol/week: 0.0 oz    Drug use: No    Current Outpatient Medications on File Prior to Visit:  aspirin (ECOTRIN) 81 MG EC tablet, Take 81 mg by mouth once daily., Disp: , Rfl:   clopidogrel (PLAVIX) 75 mg tablet, Take 1 tablet (75 mg total) by mouth once daily., Disp: 90 tablet, Rfl: 3  coenzyme Q10-vitamin E (COQ10 ) 100-100 mg-unit Cap, Take 200 mg by mouth once daily. Every day, Disp: , Rfl:   DOCOSAHEXANOIC ACID/EPA (FISH OIL ORAL), Take 1 capsule by mouth 2 (two) times daily as needed. Twice a day, Disp: , Rfl:   escitalopram oxalate (LEXAPRO) 10 MG tablet, Take 1 tablet (10 mg total) by mouth once daily., Disp: 90 tablet, Rfl: 3  metoprolol succinate (TOPROL-XL) 50 MG 24 hr tablet, Take 1 tablet (50 mg total) by mouth once daily., Disp: 90 tablet, Rfl: 3  multivitamin (THERAGRAN) per tablet, Take 1 tablet by mouth once daily. Every day, Disp: , Rfl:   rosuvastatin (CRESTOR) 20 MG tablet, Take 1 tablet (20 mg total) by mouth once daily., Disp: 30 tablet, Rfl: 11  albuterol (PROVENTIL HFA) 90 mcg/actuation inhaler, Inhale 2 puffs into the lungs every 4 (four) hours as needed for Wheezing. Every 4-6 hours PRN, Disp: 18 g, Rfl: 6  budesonide-formoterol 160-4.5 mcg (SYMBICORT) 160-4.5 mcg/actuation HFAA, Inhale 2 puffs into the lungs every 12 (twelve) hours. Twice a day, Disp: 1 Inhaler, Rfl: 6  sildenafil (VIAGRA) 100 MG tablet, Take 0.5 tablets (50  mg total) by mouth as needed for Erectile Dysfunction. As directed, Disp: 10 tablet, Rfl: 6  (DISCONTINUED) azelastine (ASTEPRO) 137 mcg nasal spray, 2 sprays by Nasal route 2 (two) times daily. Once to Twice a day, Disp: , Rfl:     No current facility-administered medications on file prior to visit.     Colonoscopy due on 01/28/2016  Influenza Vaccine due on 08/01/2018  Lipid Panel due on 08/04/2018  Pneumococcal Vaccine (65+ Low/Medium Risk)(1 of 2 - PCV13) due on 12/10/2018  Abdominal Aortic Aneurysm Screening due on 12/10/2018        Review of Systems   Constitutional: Positive for fatigue. Negative for activity change, chills and fever.   HENT: Negative for congestion, ear pain, rhinorrhea and sore throat.    Eyes: Negative for visual disturbance.   Respiratory: Negative for cough, chest tightness and shortness of breath.    Cardiovascular: Negative for chest pain and palpitations.   Gastrointestinal: Positive for abdominal pain (Vague abdominal discomfort). Negative for blood in stool, constipation, diarrhea, nausea and vomiting.   Genitourinary: Negative for difficulty urinating, dysuria and hematuria.   Musculoskeletal: Negative for arthralgias and neck pain.   Skin: Negative for rash.   Neurological: Negative for dizziness and headaches.   Psychiatric/Behavioral: Positive for dysphoric mood and sleep disturbance. The patient is nervous/anxious.        Objective:      Physical Exam   Constitutional: He is oriented to person, place, and time. He appears well-developed. No distress.   Good blood pressure control   Obese with a BMI of 31.2 is down 13 lb from his last visit with me February 8, 2016   HENT:   Head: Normocephalic and atraumatic.   Right Ear: External ear normal.   Left Ear: External ear normal.   Nose: Nose normal.   Mouth/Throat: Oropharynx is clear and moist. No oropharyngeal exudate.   Eyes: Conjunctivae and EOM are normal. Pupils are equal, round, and reactive to light. No scleral icterus.    Neck: Normal range of motion. Neck supple. No JVD present. No tracheal deviation present. No thyromegaly present.   Cardiovascular: Normal rate, regular rhythm and normal heart sounds. Exam reveals no gallop and no friction rub.   No murmur heard.  Carotid pulses 2+ without bruit   Pulmonary/Chest: Effort normal and breath sounds normal. No stridor. No respiratory distress. He has no wheezes. He has no rales. He exhibits no tenderness.   Abdominal: Soft. Bowel sounds are normal. He exhibits no distension and no mass. There is no tenderness. There is no rebound and no guarding. No hernia.   Musculoskeletal: Normal range of motion. He exhibits no edema or deformity.   Lymphadenopathy:     He has no cervical adenopathy.   Neurological: He is alert and oriented to person, place, and time. He has normal reflexes. He displays normal reflexes. No cranial nerve deficit or sensory deficit. He exhibits normal muscle tone.   Skin: Skin is warm and dry. No rash noted. He is not diaphoretic. No erythema.   Psychiatric: He has a normal mood and affect. His behavior is normal. Judgment and thought content normal.   Nursing note and vitals reviewed.      Assessment:       1. Encounter for preventive health examination    2. Fatigue, unspecified type    3. Insomnia, unspecified type    4. Hyperlipidemia, unspecified hyperlipidemia type    5. Coronary artery disease, angina presence unspecified, unspecified vessel or lesion type, unspecified whether native or transplanted heart    6. Essential hypertension    7. PAD (peripheral artery disease)    8. S/P CABG x 3, 2007    9. Immunization due    10. Encounter for abdominal aortic aneurysm (AAA) screening    11. Abdominal pain, unspecified abdominal location    12. Colon cancer screening    13. BMI 31.0-31.9,adult        Plan:       1. Encounter for preventive health examination  - Comprehensive metabolic panel; Future  - Lipid panel; Future  - TSH; Future  - CBC auto differential;  Future  - Testosterone; Future    2. Fatigue, unspecified type  - Comprehensive metabolic panel; Future  - TSH; Future  - CBC auto differential; Future  - Testosterone; Future    3. Insomnia, unspecified type  - Comprehensive metabolic panel; Future  - traZODone (DESYREL) 50 MG tablet; Take 1 tablet (50 mg total) by mouth every evening.  Dispense: 30 tablet; Refill: 5    4. Hyperlipidemia, unspecified hyperlipidemia type  - Comprehensive metabolic panel; Future  - Lipid panel; Future    5. Coronary artery disease, angina presence unspecified, unspecified vessel or lesion type, unspecified whether native or transplanted heart    Asymptomatic    6. Essential hypertension   good control with no changes needed  - Comprehensive metabolic panel; Future  - CBC auto differential; Future    7. PAD (peripheral artery disease)   asymptomatic    8. S/P CABG x 3, 2007   asymptomatic    9. Immunization due   given  - (In Office Administered) Pneumococcal Conjugate Vaccine (13 Valent) (IM)    10. Encounter for abdominal aortic aneurysm (AAA) screening   will schedule  - US Abdominal Aorta; Future    11. Abdominal pain, unspecified abdominal location   referral placed  - Ambulatory referral to Gastroenterology    12. Colon cancer screening   referral placed  - Ambulatory referral to Gastroenterology    13. BMI 31.0-31.9,adult   continue weight loss  The patient's BMI has been recorded in the chart. The patient has been provided educational materials regarding the benefits of attaining and maintaining a normal weight. We will continue to address and follow this issue during follow up visits.

## 2019-02-25 NOTE — PATIENT INSTRUCTIONS
Weight Management: Getting Started  Healthy bodies come in all shapes and sizes. Not all bodies are made to be thin. For some people, a healthy weight is higher than the average weight listed on weight charts. Your healthcare provider can help you decide on a healthy weight for you.    Reasons to lose weight  Losing weight can help with some health problems, such as high blood pressure, heart disease, diabetes, sleep apnea, and arthritis. You may also feel more energy.  Set your long-term goal  Your goal doesn't even have to be a specific weight. You may decide on a fitness goal (such as being able to walk 10 miles a week), or a health goal (such as lowering your blood pressure). Choose a goal that is measurable and reasonable, so you know when you've reached it. A goal of reaching a BMI of less than 25 is not always reasonable (or possible).   Make an action plan  Habits dont change overnight. Setting your goals too high can leave you feeling discouraged if you cant reach them. Be realistic. Choose one or two small changes you can make now. Set an action plan for how you are going to make these changes. When you can stick to this plan, keep making a few more small changes. Taking small steps will help you stay on the path to success.  Track your progress  Write down your goals. Then, keep a daily record of your progress. Write down what you eat and how active you are. This record lets you look back on how much youve done. It may also help when youre feeling frustrated. Reward yourself for success. Even if you dont reach every goal, give yourself credit for what you do get done.  Get support  Encouragement from others can help make losing weight easier. Ask your family members and friends for support. They may even want to join you. Also look to your healthcare provider, registered dietitian, and  for help. Your local hospital can give you more information about nutrition, exercise, and  weight loss.  Date Last Reviewed: 1/31/2016 © 2000-2017 [employer]. [address] [place] [address], [place] [address]. All rights reserved. This information is not intended as a substitute for professional medical care. Always follow your healthcare professional's instructions.        Walking for Fitness  Fitness walking has something for everyone, even people who are already fit. Walking is one of the safest ways to condition your body aerobically. It can boost energy, help you lose weight, and reduce stress.    Physical benefits  · Walking strengthens your heart and lungs, and tones your muscles.  · When walking, your feet land with less impact than in other sports. This reduces chances of muscle, bone, and joint injury.  · Regular walking improves your cholesterol levels and lowers your risk of heart disease. And it helps you control your blood sugar if you have diabetes.  · Walking is a weight-bearing activity, which helps maintain bone density. This can help prevent osteoporosis.  Personal rewards  · Taking walks can help you relax and manage stress. And fitness walking may make you feel better about yourself.  · Walking can help you sleep better at night and make you less likely to be depressed.  · Regular walking may help maintain your memory as you get older.  · Walking is a great way to spend extra time with friends and family members. Be sure to invite your dog along!  Q&A about fitness walking  Q: Will walking keep me fit?  A: Yes. Regular walking at the right pace gives you all the benefits of other aerobic activities, such as jogging and swimming.  Q: Will walking help me lose weight and keep it off?  A: Yes. Per mile, walking can burn as many calories as jogging. Your health care provider can help work walking into your weight-loss plan.  Q: Is walking safe for my health?  A: Yes. Walking is safe if you have high blood pressure, diabetes, heart disease, or other conditions. Talk to your  healthcare provider before you start.  Date Last Reviewed: 4/1/2017  © 3489-2443 [employer], [employer]. [address] [place] [address], [place] [address]. All rights reserved. This information is not intended as a substitute for professional medical care. Always follow your healthcare professional's instructions.

## 2019-02-27 ENCOUNTER — LAB VISIT (OUTPATIENT)
Dept: LAB | Facility: HOSPITAL | Age: 64
End: 2019-02-27
Attending: FAMILY MEDICINE
Payer: MEDICARE

## 2019-02-27 ENCOUNTER — HOSPITAL ENCOUNTER (OUTPATIENT)
Dept: RADIOLOGY | Facility: CLINIC | Age: 64
Discharge: HOME | End: 2019-02-27
Attending: FAMILY MEDICINE
Payer: MEDICARE

## 2019-02-27 DIAGNOSIS — E78.5 HYPERLIPIDEMIA, UNSPECIFIED HYPERLIPIDEMIA TYPE: ICD-10-CM

## 2019-02-27 DIAGNOSIS — Z13.6 ENCOUNTER FOR ABDOMINAL AORTIC ANEURYSM (AAA) SCREENING: ICD-10-CM

## 2019-02-27 DIAGNOSIS — R53.83 FATIGUE, UNSPECIFIED TYPE: ICD-10-CM

## 2019-02-27 DIAGNOSIS — D53.9 NUTRITIONAL ANEMIA: Primary | ICD-10-CM

## 2019-02-27 DIAGNOSIS — D64.9 ANEMIA, UNSPECIFIED TYPE: ICD-10-CM

## 2019-02-27 DIAGNOSIS — I10 ESSENTIAL HYPERTENSION: ICD-10-CM

## 2019-02-27 DIAGNOSIS — Z00.00 ENCOUNTER FOR PREVENTIVE HEALTH EXAMINATION: ICD-10-CM

## 2019-02-27 DIAGNOSIS — G47.00 INSOMNIA, UNSPECIFIED TYPE: ICD-10-CM

## 2019-02-27 LAB
ALBUMIN SERPL BCP-MCNC: 3.5 G/DL
ALP SERPL-CCNC: 79 U/L
ALT SERPL W/O P-5'-P-CCNC: 9 U/L
ANION GAP SERPL CALC-SCNC: 8 MMOL/L
AST SERPL-CCNC: 18 U/L
BASOPHILS # BLD AUTO: 0.04 K/UL
BASOPHILS NFR BLD: 0.6 %
BILIRUB SERPL-MCNC: 0.5 MG/DL
BUN SERPL-MCNC: 13 MG/DL
CALCIUM SERPL-MCNC: 9.8 MG/DL
CHLORIDE SERPL-SCNC: 106 MMOL/L
CHOLEST SERPL-MCNC: 140 MG/DL
CHOLEST/HDLC SERPL: 2.9 {RATIO}
CO2 SERPL-SCNC: 26 MMOL/L
CREAT SERPL-MCNC: 1 MG/DL
DIFFERENTIAL METHOD: ABNORMAL
EOSINOPHIL # BLD AUTO: 0.1 K/UL
EOSINOPHIL NFR BLD: 1.9 %
ERYTHROCYTE [DISTWIDTH] IN BLOOD BY AUTOMATED COUNT: 13.4 %
EST. GFR  (AFRICAN AMERICAN): >60 ML/MIN/1.73 M^2
EST. GFR  (NON AFRICAN AMERICAN): >60 ML/MIN/1.73 M^2
GLUCOSE SERPL-MCNC: 87 MG/DL
HCT VFR BLD AUTO: 38.4 %
HDLC SERPL-MCNC: 48 MG/DL
HDLC SERPL: 34.3 %
HGB BLD-MCNC: 12 G/DL
IMM GRANULOCYTES # BLD AUTO: 0.03 K/UL
IMM GRANULOCYTES NFR BLD AUTO: 0.4 %
LDLC SERPL CALC-MCNC: 62 MG/DL
LYMPHOCYTES # BLD AUTO: 1.7 K/UL
LYMPHOCYTES NFR BLD: 24.9 %
MCH RBC QN AUTO: 31.4 PG
MCHC RBC AUTO-ENTMCNC: 31.3 G/DL
MCV RBC AUTO: 101 FL
MONOCYTES # BLD AUTO: 0.6 K/UL
MONOCYTES NFR BLD: 8.7 %
NEUTROPHILS # BLD AUTO: 4.2 K/UL
NEUTROPHILS NFR BLD: 63.5 %
NONHDLC SERPL-MCNC: 92 MG/DL
NRBC BLD-RTO: 0 /100 WBC
PLATELET # BLD AUTO: 184 K/UL
PMV BLD AUTO: 10.6 FL
POTASSIUM SERPL-SCNC: 4 MMOL/L
PROT SERPL-MCNC: 7.4 G/DL
RBC # BLD AUTO: 3.82 M/UL
SODIUM SERPL-SCNC: 140 MMOL/L
TESTOST SERPL-MCNC: 349 NG/DL
TRIGL SERPL-MCNC: 150 MG/DL
TSH SERPL DL<=0.005 MIU/L-ACNC: 1.57 UIU/ML
WBC # BLD AUTO: 6.67 K/UL

## 2019-02-27 PROCEDURE — 76775 US EXAM ABDO BACK WALL LIM: CPT | Mod: TC,HCNC,PO

## 2019-02-27 PROCEDURE — 36415 COLL VENOUS BLD VENIPUNCTURE: CPT | Mod: HCNC,PO

## 2019-02-27 PROCEDURE — 84443 ASSAY THYROID STIM HORMONE: CPT | Mod: HCNC

## 2019-02-27 PROCEDURE — 80061 LIPID PANEL: CPT | Mod: HCNC

## 2019-02-27 PROCEDURE — 76775 US EXAM ABDO BACK WALL LIM: CPT | Mod: 26,HCNC,, | Performed by: RADIOLOGY

## 2019-02-27 PROCEDURE — 85025 COMPLETE CBC W/AUTO DIFF WBC: CPT | Mod: HCNC

## 2019-02-27 PROCEDURE — 84403 ASSAY OF TOTAL TESTOSTERONE: CPT | Mod: HCNC

## 2019-02-27 PROCEDURE — 80053 COMPREHEN METABOLIC PANEL: CPT | Mod: HCNC

## 2019-02-27 PROCEDURE — 76775 US ABDOMINAL AORTA: ICD-10-PCS | Mod: 26,HCNC,, | Performed by: RADIOLOGY

## 2019-03-01 ENCOUNTER — LAB VISIT (OUTPATIENT)
Dept: LAB | Facility: HOSPITAL | Age: 64
End: 2019-03-01
Attending: FAMILY MEDICINE
Payer: MEDICARE

## 2019-03-01 DIAGNOSIS — D53.9 NUTRITIONAL ANEMIA: ICD-10-CM

## 2019-03-01 DIAGNOSIS — D64.9 ANEMIA, UNSPECIFIED TYPE: ICD-10-CM

## 2019-03-01 LAB
FERRITIN SERPL-MCNC: 153 NG/ML
FOLATE SERPL-MCNC: 13.2 NG/ML
IRON SERPL-MCNC: 56 UG/DL
RETICS/RBC NFR AUTO: 2.4 %
SATURATED IRON: 15 %
TOTAL IRON BINDING CAPACITY: 367 UG/DL
TRANSFERRIN SERPL-MCNC: 248 MG/DL
VIT B12 SERPL-MCNC: 462 PG/ML

## 2019-03-01 PROCEDURE — 82607 VITAMIN B-12: CPT | Mod: HCNC

## 2019-03-01 PROCEDURE — 85045 AUTOMATED RETICULOCYTE COUNT: CPT | Mod: HCNC

## 2019-03-01 PROCEDURE — 82746 ASSAY OF FOLIC ACID SERUM: CPT | Mod: HCNC

## 2019-03-01 PROCEDURE — 36415 COLL VENOUS BLD VENIPUNCTURE: CPT | Mod: HCNC,PO

## 2019-03-01 PROCEDURE — 82728 ASSAY OF FERRITIN: CPT | Mod: HCNC

## 2019-03-01 PROCEDURE — 83540 ASSAY OF IRON: CPT | Mod: HCNC

## 2019-03-11 LAB
CTP QC/QA: YES
FECAL OCCULT BLOOD, POC: NEGATIVE

## 2019-03-12 ENCOUNTER — TELEPHONE (OUTPATIENT)
Dept: FAMILY MEDICINE | Facility: CLINIC | Age: 64
End: 2019-03-12

## 2019-03-12 DIAGNOSIS — D50.9 IRON DEFICIENCY ANEMIA, UNSPECIFIED IRON DEFICIENCY ANEMIA TYPE: Primary | ICD-10-CM

## 2019-03-12 NOTE — TELEPHONE ENCOUNTER
----- Message from Genaro Womack MD sent at 3/11/2019  7:20 PM CDT -----  The stool tests are all negative with no sign of active GI bleeding currently.    I would recommend starting on Fergon 324 mg once or twice daily taken with some vitamin-C to assist absorption.  After three months we can repeat a CBC and iron level.  If no improvement or if he worsens we may still have to have GI check him.

## 2019-03-12 NOTE — TELEPHONE ENCOUNTER
Left message for patient to call back for test results and Dr Womack recommendations. Need orders for future labs

## 2019-05-01 ENCOUNTER — TELEPHONE (OUTPATIENT)
Dept: CARDIOLOGY | Facility: CLINIC | Age: 64
End: 2019-05-01

## 2019-05-01 NOTE — TELEPHONE ENCOUNTER
Call placed to  Melody in regards to his appt that he has on May 6th with Dr. Mcneill. No answer, left message to return call.

## 2019-06-18 ENCOUNTER — CLINICAL SUPPORT (OUTPATIENT)
Dept: SMOKING CESSATION | Facility: CLINIC | Age: 64
End: 2019-06-18
Payer: COMMERCIAL

## 2019-06-18 DIAGNOSIS — F17.200 NICOTINE DEPENDENCE: Primary | ICD-10-CM

## 2019-06-18 PROCEDURE — 99407 PR TOBACCO USE CESSATION INTENSIVE >10 MINUTES: ICD-10-PCS | Mod: S$GLB,,, | Performed by: INTERNAL MEDICINE

## 2019-06-18 PROCEDURE — 99407 BEHAV CHNG SMOKING > 10 MIN: CPT | Mod: S$GLB,,, | Performed by: INTERNAL MEDICINE

## 2019-06-18 NOTE — PROGRESS NOTES
Spoke with patient's wife Beatris today in regard to smoking cessation progress for 6/12 month telephone follow up, she states he is  tobacco free as of 5/31/2018. Commended patient on the accomplishment. Patient's wife states he used the prescribed tobacco cessation medication of Wellbutrin to help aid in his quit.  Informed patient's wife of benefit period and contact information if any further help or support is needed. Will resolve episode and complete smart form for Quit attempt #1.

## 2020-01-23 DIAGNOSIS — I25.10 CORONARY ARTERY DISEASE INVOLVING NATIVE CORONARY ARTERY WITHOUT ANGINA PECTORIS, UNSPECIFIED WHETHER NATIVE OR TRANSPLANTED HEART: ICD-10-CM

## 2020-01-23 DIAGNOSIS — I10 ESSENTIAL HYPERTENSION: ICD-10-CM

## 2020-01-23 DIAGNOSIS — E78.00 HYPERCHOLESTEROLEMIA: ICD-10-CM

## 2020-01-23 DIAGNOSIS — G47.00 INSOMNIA, UNSPECIFIED TYPE: ICD-10-CM

## 2020-01-23 DIAGNOSIS — I73.9 PAD (PERIPHERAL ARTERY DISEASE): ICD-10-CM

## 2020-01-23 DIAGNOSIS — Z95.5 S/P CORONARY ARTERY STENT PLACEMENT: ICD-10-CM

## 2020-01-23 RX ORDER — CLOPIDOGREL BISULFATE 75 MG/1
75 TABLET ORAL DAILY
Qty: 90 TABLET | Refills: 0 | Status: SHIPPED | OUTPATIENT
Start: 2020-01-23 | End: 2020-03-30 | Stop reason: SDUPTHER

## 2020-01-23 RX ORDER — TRAZODONE HYDROCHLORIDE 50 MG/1
TABLET ORAL
Qty: 30 TABLET | Refills: 2 | Status: SHIPPED | OUTPATIENT
Start: 2020-01-23 | End: 2021-02-01

## 2020-01-24 NOTE — TELEPHONE ENCOUNTER
His last checkup was February 25, 2019.  He does not have one scheduled yet this year, please make appointment sometime in the next few months

## 2020-03-14 DIAGNOSIS — Z95.5 S/P CORONARY ARTERY STENT PLACEMENT: ICD-10-CM

## 2020-03-14 DIAGNOSIS — I25.10 CORONARY ARTERY DISEASE INVOLVING NATIVE CORONARY ARTERY WITHOUT ANGINA PECTORIS, UNSPECIFIED WHETHER NATIVE OR TRANSPLANTED HEART: ICD-10-CM

## 2020-03-14 DIAGNOSIS — I10 ESSENTIAL HYPERTENSION: ICD-10-CM

## 2020-03-14 DIAGNOSIS — I73.9 PAD (PERIPHERAL ARTERY DISEASE): ICD-10-CM

## 2020-03-14 DIAGNOSIS — Z95.1 S/P CABG X 3: ICD-10-CM

## 2020-03-14 DIAGNOSIS — E78.00 PURE HYPERCHOLESTEROLEMIA: ICD-10-CM

## 2020-03-16 ENCOUNTER — PATIENT OUTREACH (OUTPATIENT)
Dept: ADMINISTRATIVE | Facility: HOSPITAL | Age: 65
End: 2020-03-16

## 2020-03-16 DIAGNOSIS — Z13.6 ENCOUNTER FOR LIPID SCREENING FOR CARDIOVASCULAR DISEASE: Primary | ICD-10-CM

## 2020-03-16 DIAGNOSIS — Z13.220 ENCOUNTER FOR LIPID SCREENING FOR CARDIOVASCULAR DISEASE: Primary | ICD-10-CM

## 2020-03-16 RX ORDER — ROSUVASTATIN CALCIUM 20 MG/1
20 TABLET, COATED ORAL DAILY
Qty: 30 TABLET | Refills: 0 | Status: SHIPPED | OUTPATIENT
Start: 2020-03-16 | End: 2020-03-30 | Stop reason: SDUPTHER

## 2020-03-16 RX ORDER — METOPROLOL SUCCINATE 50 MG/1
50 TABLET, EXTENDED RELEASE ORAL NIGHTLY
Qty: 30 TABLET | Refills: 0 | Status: SHIPPED | OUTPATIENT
Start: 2020-03-16 | End: 2020-03-30 | Stop reason: SDUPTHER

## 2020-03-16 NOTE — PROGRESS NOTES
Chart review completed 03/16/2020.  Care Everywhere updates requested and reviewed.  Immunizations reconciled. Media reviewed.       WOG orders placed. LIPID  Letter mailed.  YES  HM updated with external COLONOSCOPY report.     Health Maintenance Due   Topic Date Due    Shingles Vaccine (1 of 2) 12/10/2003    Colonoscopy  01/28/2016    Lipid Panel  02/27/2020

## 2020-03-23 ENCOUNTER — DOCUMENTATION ONLY (OUTPATIENT)
Dept: FAMILY MEDICINE | Facility: CLINIC | Age: 65
End: 2020-03-23

## 2020-03-23 NOTE — PROGRESS NOTES
Pre-Visit Chart Review  For Appointment Scheduled on 3-30-20    Health Maintenance Due   Topic Date Due    Colonoscopy  01/28/2016    Lipid Panel  02/27/2020

## 2020-03-30 ENCOUNTER — OFFICE VISIT (OUTPATIENT)
Dept: FAMILY MEDICINE | Facility: CLINIC | Age: 65
End: 2020-03-30
Attending: FAMILY MEDICINE
Payer: MEDICARE

## 2020-03-30 ENCOUNTER — LAB VISIT (OUTPATIENT)
Dept: LAB | Facility: HOSPITAL | Age: 65
End: 2020-03-30
Attending: FAMILY MEDICINE
Payer: MEDICARE

## 2020-03-30 VITALS
HEIGHT: 68 IN | TEMPERATURE: 98 F | SYSTOLIC BLOOD PRESSURE: 110 MMHG | RESPIRATION RATE: 28 BRPM | BODY MASS INDEX: 29.9 KG/M2 | WEIGHT: 197.31 LBS | HEART RATE: 67 BPM | OXYGEN SATURATION: 94 % | DIASTOLIC BLOOD PRESSURE: 58 MMHG

## 2020-03-30 DIAGNOSIS — E66.9 OBESITY (BMI 30.0-34.9): ICD-10-CM

## 2020-03-30 DIAGNOSIS — D49.0 COLORECTAL NEOPLASM: ICD-10-CM

## 2020-03-30 DIAGNOSIS — I10 ESSENTIAL HYPERTENSION: ICD-10-CM

## 2020-03-30 DIAGNOSIS — Z95.1 S/P CABG X 3: ICD-10-CM

## 2020-03-30 DIAGNOSIS — R63.4 UNINTENTIONAL WEIGHT LOSS: ICD-10-CM

## 2020-03-30 DIAGNOSIS — Z87.891 PERSONAL HISTORY OF NICOTINE DEPENDENCE: ICD-10-CM

## 2020-03-30 DIAGNOSIS — R19.4 CHANGE IN BOWEL HABITS: ICD-10-CM

## 2020-03-30 DIAGNOSIS — I25.10 CORONARY ARTERY DISEASE INVOLVING NATIVE CORONARY ARTERY WITHOUT ANGINA PECTORIS, UNSPECIFIED WHETHER NATIVE OR TRANSPLANTED HEART: ICD-10-CM

## 2020-03-30 DIAGNOSIS — I73.9 PAD (PERIPHERAL ARTERY DISEASE): ICD-10-CM

## 2020-03-30 DIAGNOSIS — E78.5 HYPERLIPIDEMIA, UNSPECIFIED HYPERLIPIDEMIA TYPE: ICD-10-CM

## 2020-03-30 DIAGNOSIS — Z12.9 SCREENING FOR CANCER: ICD-10-CM

## 2020-03-30 DIAGNOSIS — Z12.5 PROSTATE CANCER SCREENING: ICD-10-CM

## 2020-03-30 DIAGNOSIS — Z00.00 ENCOUNTER FOR PREVENTIVE HEALTH EXAMINATION: Primary | ICD-10-CM

## 2020-03-30 DIAGNOSIS — F32.9 REACTIVE DEPRESSION: ICD-10-CM

## 2020-03-30 DIAGNOSIS — D64.9 ANEMIA, UNSPECIFIED TYPE: ICD-10-CM

## 2020-03-30 DIAGNOSIS — Z00.00 ENCOUNTER FOR PREVENTIVE HEALTH EXAMINATION: ICD-10-CM

## 2020-03-30 DIAGNOSIS — Z86.010 HISTORY OF ADENOMATOUS POLYP OF COLON: ICD-10-CM

## 2020-03-30 LAB
ALBUMIN SERPL BCP-MCNC: 3.7 G/DL (ref 3.5–5.2)
ALP SERPL-CCNC: 87 U/L (ref 55–135)
ALT SERPL W/O P-5'-P-CCNC: 11 U/L (ref 10–44)
ANION GAP SERPL CALC-SCNC: 8 MMOL/L (ref 8–16)
AST SERPL-CCNC: 21 U/L (ref 10–40)
BASOPHILS # BLD AUTO: 0.03 K/UL (ref 0–0.2)
BASOPHILS NFR BLD: 0.5 % (ref 0–1.9)
BILIRUB SERPL-MCNC: 0.5 MG/DL (ref 0.1–1)
BUN SERPL-MCNC: 11 MG/DL (ref 8–23)
CALCIUM SERPL-MCNC: 10.1 MG/DL (ref 8.7–10.5)
CHLORIDE SERPL-SCNC: 104 MMOL/L (ref 95–110)
CHOLEST SERPL-MCNC: 125 MG/DL (ref 120–199)
CHOLEST/HDLC SERPL: 2.9 {RATIO} (ref 2–5)
CO2 SERPL-SCNC: 29 MMOL/L (ref 23–29)
COMPLEXED PSA SERPL-MCNC: 1.3 NG/ML (ref 0–4)
CREAT SERPL-MCNC: 0.8 MG/DL (ref 0.5–1.4)
DIFFERENTIAL METHOD: ABNORMAL
EOSINOPHIL # BLD AUTO: 0.1 K/UL (ref 0–0.5)
EOSINOPHIL NFR BLD: 1.6 % (ref 0–8)
ERYTHROCYTE [DISTWIDTH] IN BLOOD BY AUTOMATED COUNT: 13.2 % (ref 11.5–14.5)
EST. GFR  (AFRICAN AMERICAN): >60 ML/MIN/1.73 M^2
EST. GFR  (NON AFRICAN AMERICAN): >60 ML/MIN/1.73 M^2
FERRITIN SERPL-MCNC: 252 NG/ML (ref 20–300)
GLUCOSE SERPL-MCNC: 87 MG/DL (ref 70–110)
HCT VFR BLD AUTO: 47.8 % (ref 40–54)
HDLC SERPL-MCNC: 43 MG/DL (ref 40–75)
HDLC SERPL: 34.4 % (ref 20–50)
HGB BLD-MCNC: 14.8 G/DL (ref 14–18)
IMM GRANULOCYTES # BLD AUTO: 0.03 K/UL (ref 0–0.04)
IMM GRANULOCYTES NFR BLD AUTO: 0.5 % (ref 0–0.5)
IRON SERPL-MCNC: 71 UG/DL (ref 45–160)
LDLC SERPL CALC-MCNC: 60.6 MG/DL (ref 63–159)
LYMPHOCYTES # BLD AUTO: 1.6 K/UL (ref 1–4.8)
LYMPHOCYTES NFR BLD: 25.3 % (ref 18–48)
MCH RBC QN AUTO: 32 PG (ref 27–31)
MCHC RBC AUTO-ENTMCNC: 31 G/DL (ref 32–36)
MCV RBC AUTO: 104 FL (ref 82–98)
MONOCYTES # BLD AUTO: 0.5 K/UL (ref 0.3–1)
MONOCYTES NFR BLD: 7.6 % (ref 4–15)
NEUTROPHILS # BLD AUTO: 4.2 K/UL (ref 1.8–7.7)
NEUTROPHILS NFR BLD: 64.5 % (ref 38–73)
NONHDLC SERPL-MCNC: 82 MG/DL
NRBC BLD-RTO: 0 /100 WBC
PLATELET # BLD AUTO: 159 K/UL (ref 150–350)
PMV BLD AUTO: 10.9 FL (ref 9.2–12.9)
POTASSIUM SERPL-SCNC: 4.7 MMOL/L (ref 3.5–5.1)
PROT SERPL-MCNC: 8.2 G/DL (ref 6–8.4)
RBC # BLD AUTO: 4.62 M/UL (ref 4.6–6.2)
SATURATED IRON: 19 % (ref 20–50)
SODIUM SERPL-SCNC: 141 MMOL/L (ref 136–145)
TOTAL IRON BINDING CAPACITY: 371 UG/DL (ref 250–450)
TRANSFERRIN SERPL-MCNC: 251 MG/DL (ref 200–375)
TRIGL SERPL-MCNC: 107 MG/DL (ref 30–150)
WBC # BLD AUTO: 6.44 K/UL (ref 3.9–12.7)

## 2020-03-30 PROCEDURE — 36415 COLL VENOUS BLD VENIPUNCTURE: CPT | Mod: HCNC,PO

## 2020-03-30 PROCEDURE — 99499 UNLISTED E&M SERVICE: CPT | Mod: S$GLB,,, | Performed by: FAMILY MEDICINE

## 2020-03-30 PROCEDURE — 99397 PER PM REEVAL EST PAT 65+ YR: CPT | Mod: HCNC,S$GLB,, | Performed by: FAMILY MEDICINE

## 2020-03-30 PROCEDURE — 3074F SYST BP LT 130 MM HG: CPT | Mod: HCNC,CPTII,S$GLB, | Performed by: FAMILY MEDICINE

## 2020-03-30 PROCEDURE — 80053 COMPREHEN METABOLIC PANEL: CPT | Mod: HCNC

## 2020-03-30 PROCEDURE — 83540 ASSAY OF IRON: CPT | Mod: HCNC

## 2020-03-30 PROCEDURE — 82728 ASSAY OF FERRITIN: CPT | Mod: HCNC

## 2020-03-30 PROCEDURE — 99499 RISK ADDL DX/OHS AUDIT: ICD-10-PCS | Mod: S$GLB,,, | Performed by: FAMILY MEDICINE

## 2020-03-30 PROCEDURE — 99999 PR PBB SHADOW E&M-EST. PATIENT-LVL IV: ICD-10-PCS | Mod: PBBFAC,HCNC,, | Performed by: FAMILY MEDICINE

## 2020-03-30 PROCEDURE — 3074F PR MOST RECENT SYSTOLIC BLOOD PRESSURE < 130 MM HG: ICD-10-PCS | Mod: HCNC,CPTII,S$GLB, | Performed by: FAMILY MEDICINE

## 2020-03-30 PROCEDURE — 3078F DIAST BP <80 MM HG: CPT | Mod: HCNC,CPTII,S$GLB, | Performed by: FAMILY MEDICINE

## 2020-03-30 PROCEDURE — 3078F PR MOST RECENT DIASTOLIC BLOOD PRESSURE < 80 MM HG: ICD-10-PCS | Mod: HCNC,CPTII,S$GLB, | Performed by: FAMILY MEDICINE

## 2020-03-30 PROCEDURE — 85025 COMPLETE CBC W/AUTO DIFF WBC: CPT | Mod: HCNC

## 2020-03-30 PROCEDURE — 84153 ASSAY OF PSA TOTAL: CPT | Mod: HCNC

## 2020-03-30 PROCEDURE — 99397 PR PREVENTIVE VISIT,EST,65 & OVER: ICD-10-PCS | Mod: HCNC,S$GLB,, | Performed by: FAMILY MEDICINE

## 2020-03-30 PROCEDURE — 80061 LIPID PANEL: CPT | Mod: HCNC

## 2020-03-30 PROCEDURE — 99999 PR PBB SHADOW E&M-EST. PATIENT-LVL IV: CPT | Mod: PBBFAC,HCNC,, | Performed by: FAMILY MEDICINE

## 2020-03-30 RX ORDER — METOPROLOL SUCCINATE 50 MG/1
50 TABLET, EXTENDED RELEASE ORAL NIGHTLY
Qty: 30 TABLET | Refills: 0 | Status: SHIPPED | OUTPATIENT
Start: 2020-03-30 | End: 2020-05-08

## 2020-03-30 RX ORDER — ALBUTEROL SULFATE 90 UG/1
2 AEROSOL, METERED RESPIRATORY (INHALATION) EVERY 4 HOURS PRN
Qty: 18 G | Refills: 6 | Status: SHIPPED | OUTPATIENT
Start: 2020-03-30 | End: 2022-06-15 | Stop reason: SDUPTHER

## 2020-03-30 RX ORDER — BUDESONIDE AND FORMOTEROL FUMARATE DIHYDRATE 160; 4.5 UG/1; UG/1
2 AEROSOL RESPIRATORY (INHALATION) EVERY 12 HOURS
Qty: 1 INHALER | Refills: 6 | Status: SHIPPED | OUTPATIENT
Start: 2020-03-30 | End: 2022-06-15 | Stop reason: SDUPTHER

## 2020-03-30 RX ORDER — ROSUVASTATIN CALCIUM 20 MG/1
20 TABLET, COATED ORAL DAILY
Qty: 30 TABLET | Refills: 0 | Status: SHIPPED | OUTPATIENT
Start: 2020-03-30 | End: 2020-05-04

## 2020-03-30 RX ORDER — CLOPIDOGREL BISULFATE 75 MG/1
75 TABLET ORAL DAILY
Qty: 90 TABLET | Refills: 0 | Status: SHIPPED | OUTPATIENT
Start: 2020-03-30 | End: 2022-06-15 | Stop reason: SDUPTHER

## 2020-03-30 RX ORDER — ESCITALOPRAM OXALATE 10 MG/1
10 TABLET ORAL DAILY
Qty: 90 TABLET | Refills: 3 | Status: SHIPPED | OUTPATIENT
Start: 2020-03-30 | End: 2022-06-15 | Stop reason: SDUPTHER

## 2020-03-30 NOTE — PROGRESS NOTES
Subjective:       Patient ID: Srinivasa Oliver is a 64 y.o. male.    Chief Complaint: Annual Exam    64-year-old male coming in for a physical exam.  He is fasting for lab.  He has a history of coronary artery disease with three vessel bypass surgery in 2007 as well as stents placed and is on Plavix.  He has a history of COPD/emphysema and a 31 year pack history of smoking having quit two years ago.  He has a history of adenomatous colon polyps with his last colonoscopy in January 2011 done by Dr. Choudhary.  He was supposed to have a repeat and 2016.  He states he called the office but never had any call back.  At his last physical he was noted to be mildly anemic with a hemoglobin of 12.  Hemoccults were negative and a workup was negative.  He was placed on a brief course of iron with a follow-up CBC ordered but did not return for follow-up.  He has a history of hypertension, peptic ulcer disease, anxiety and depression, peripheral arterial disease hyperlipidemia and the anemia.  His only complaint at this time is diarrhea.  He is not very knowledgeable about his own health and not sure of what he is taking although he has four of his medication bottles in his pocket.  He states he has cut back on his dietary intake but has not been intentionally losing weight.  He is noted to have dropped 26 lb since his last physical February 25, 2019.    Past Medical History:  1/28/2011: Adenomatous colon polyp  No date: Adenomatous colon polyp  No date: Anxiety  No date: Blood transfusion  No date: CHF (congestive heart failure)  No date: Emphysema of lung  No date: Hypertension  1/7/2013: S/P coronary artery stent placement  No date: Staph skin infection      Comment:  abd/ lanced x 2  No date: Ulcer    Past Surgical History:  1-: COLONOSCOPY      Comment:  Dr Choudhary, tubular adenoma  No date: CORONARY ARTERY BYPASS GRAFT  No date: CORONARY STENT PLACEMENT    Review of patient's family history indicates:  Problem:  Heart disease      Relation: Mother          Age of Onset: (Not Specified)  Problem: Heart disease      Relation: Father          Age of Onset: (Not Specified)  Problem: Diabetes      Relation: Father          Age of Onset: (Not Specified)  Problem: Heart disease      Relation: Maternal Aunt          Age of Onset: (Not Specified)  Problem: No Known Problems      Relation: Sister          Age of Onset: (Not Specified)  Problem: No Known Problems      Relation: Brother          Age of Onset: (Not Specified)  Problem: No Known Problems      Relation: Daughter          Age of Onset: (Not Specified)  Problem: No Known Problems      Relation: Son          Age of Onset: (Not Specified)    Social History    Tobacco Use      Smoking status: Former Smoker        Packs/day: 0.75        Years: 42.00        Pack years: 31.5        Types: Cigarettes        Quit date: 2018        Years since quittin.8      Smokeless tobacco: Never Used      Tobacco comment: patient room 7     Alcohol use: No      Alcohol/week: 0.0 standard drinks    Drug use: No    Current Outpatient Medications on File Prior to Visit:  aspirin (ECOTRIN) 81 MG EC tablet, Take 81 mg by mouth once daily., Disp: , Rfl:   coenzyme Q10-vitamin E (COQ10 ) 100-100 mg-unit Cap, Take 200 mg by mouth once daily. Every day, Disp: , Rfl:   DOCOSAHEXANOIC ACID/EPA (FISH OIL ORAL), Take 1 capsule by mouth 2 (two) times daily as needed. Twice a day, Disp: , Rfl:   multivitamin (THERAGRAN) per tablet, Take 1 tablet by mouth once daily. Every day, Disp: , Rfl:   traZODone (DESYREL) 50 MG tablet, TAKE 1 TABLET BY MOUTH IN THE EVENING, Disp: 30 tablet, Rfl: 2  (DISCONTINUED) albuterol (PROVENTIL HFA) 90 mcg/actuation inhaler, Inhale 2 puffs into the lungs every 4 (four) hours as needed for Wheezing. Every 4-6 hours PRN, Disp: 18 g, Rfl: 6  (DISCONTINUED) budesonide-formoterol 160-4.5 mcg (SYMBICORT) 160-4.5 mcg/actuation HFAA, Inhale 2 puffs into the lungs every 12  (twelve) hours. Twice a day, Disp: 1 Inhaler, Rfl: 6  (DISCONTINUED) clopidogrel (PLAVIX) 75 mg tablet, Take 1 tablet (75 mg total) by mouth once daily. Need office visit before next refill, last seen 1/2019. This will be the LAST Rx if visit is not made., Disp: 90 tablet, Rfl: 0  (DISCONTINUED) escitalopram oxalate (LEXAPRO) 10 MG tablet, Take 1 tablet (10 mg total) by mouth once daily., Disp: 90 tablet, Rfl: 3  (DISCONTINUED) metoprolol succinate (TOPROL-XL) 50 MG 24 hr tablet, Take 1 tablet (50 mg total) by mouth every evening. DO NOT CRUSH OR CHEW; SWALLOW WHOLE. Need office visit before next refill, last seen 1/2019. This will be the LAST Rx if visit is not made., Disp: 30 tablet, Rfl: 0  (DISCONTINUED) rosuvastatin (CRESTOR) 20 MG tablet, Take 1 tablet (20 mg total) by mouth once daily. Need office visit before next refill, last seen 1/2019. This will be the LAST Rx if visit is not made., Disp: 30 tablet, Rfl: 0  (DISCONTINUED) sildenafil (VIAGRA) 100 MG tablet, Take 0.5 tablets (50 mg total) by mouth as needed for Erectile Dysfunction. As directed (Patient not taking: Reported on 3/30/2020), Disp: 10 tablet, Rfl: 6    No current facility-administered medications on file prior to visit.     Shingles Vaccine(1 of 2) due on 12/10/2003  LDCT Lung Screen due on 12/10/2008  Colonoscopy due on 01/28/2016  Lipid Panel due on 02/27/2020      Review of Systems   Constitutional: Positive for appetite change and unexpected weight change. Negative for activity change, chills, diaphoresis, fatigue and fever.   HENT: Negative for congestion, postnasal drip, rhinorrhea, sinus pressure and sinus pain.    Eyes: Negative for visual disturbance.   Respiratory: Negative for cough, chest tightness, shortness of breath and wheezing.    Cardiovascular: Negative for chest pain and palpitations.   Gastrointestinal: Positive for diarrhea. Negative for abdominal distention, abdominal pain, anal bleeding, blood in stool, nausea and  vomiting.   Endocrine: Negative for cold intolerance, heat intolerance, polydipsia and polyuria.   Genitourinary: Negative for dysuria, frequency and hematuria.   Musculoskeletal: Negative for arthralgias, back pain and neck pain.   Skin: Negative for color change and rash.   Neurological: Negative for dizziness, light-headedness and headaches.   Hematological: Negative for adenopathy. Bruises/bleeds easily.   Psychiatric/Behavioral: Negative for dysphoric mood and sleep disturbance. The patient is not nervous/anxious.        Objective:      Physical Exam   Constitutional: He is oriented to person, place, and time. He appears well-developed. No distress.   Good blood pressure control  Obese with a BMI of 30.5, as noted he has a 26 lb loss since February 25, 2019   HENT:   Head: Normocephalic and atraumatic.   Right Ear: External ear normal.   Left Ear: External ear normal.   Nose: Nose normal.   Mouth/Throat: Oropharynx is clear and moist. No oropharyngeal exudate.   Eyes: Pupils are equal, round, and reactive to light. Conjunctivae and EOM are normal. No scleral icterus.   No excessive pallor of conjunctiva   Neck: Normal range of motion. Neck supple. No JVD present. No tracheal deviation present. No thyromegaly present.   Cardiovascular: Normal rate, regular rhythm and normal heart sounds. Exam reveals no gallop and no friction rub.   No murmur heard.  Carotid pulses 2+ no bruit   Pulmonary/Chest: Effort normal and breath sounds normal. No stridor. No respiratory distress. He has no wheezes. He has no rales. He exhibits no tenderness.   Abdominal: Soft. Bowel sounds are normal. He exhibits no distension and no mass. There is no tenderness. There is no rebound and no guarding. No hernia.   Genitourinary: Rectum normal and prostate normal. Rectal exam shows guaiac negative stool (Dark black stool but Hemoccult negative).   Musculoskeletal: Normal range of motion. He exhibits no edema or deformity.   Lymphadenopathy:      He has no cervical adenopathy.   Neurological: He is alert and oriented to person, place, and time. He displays normal reflexes. No cranial nerve deficit or sensory deficit. He exhibits normal muscle tone.   Skin: Skin is warm and dry. No rash noted. He is not diaphoretic. No erythema. No pallor.   Psychiatric: He has a normal mood and affect. His behavior is normal. Judgment and thought content normal.   Nursing note and vitals reviewed.      Assessment:       1. Encounter for preventive health examination    2. Change in bowel habits    3. History of adenomatous polyp of colon    4. Essential hypertension    5. Hyperlipidemia, unspecified hyperlipidemia type    6. PAD (peripheral artery disease)    7. S/P CABG x 3, 2007    8. Screening for cancer    9. Colorectal neoplasm    10. Unintentional weight loss    11. Anemia, unspecified type    12. Prostate cancer screening    13. Personal history of nicotine dependence     14. Coronary artery disease involving native coronary artery without angina pectoris, unspecified whether native or transplanted heart    15. Reactive depression    16. Obesity (BMI 30.0-34.9), today 30.7        Plan:       1. Encounter for preventive health examination  - Comprehensive metabolic panel; Future  - Lipid panel; Future  - PSA, Screening; Future  - CBC auto differential; Future    2. Change in bowel habits  Appears to be taking iron still although he is not aware of it  - CT Abdomen Pelvis With Contrast; Future-EMERGENT  - Comprehensive metabolic panel; Future  - CBC auto differential; Future  - Ambulatory referral/consult to Gastroenterology; Future    3. History of adenomatous polyp of colon  Overdue for colonoscopy and concern with his weight loss  - CT Abdomen Pelvis With Contrast; Future-EMERGENT  - Ambulatory referral/consult to Gastroenterology; Future    4. Essential hypertension  Good control with no changes needed  - Comprehensive metabolic panel; Future  - Lipid panel;  Future  - CBC auto differential; Future  - albuterol (PROVENTIL HFA) 90 mcg/actuation inhaler; Inhale 2 puffs into the lungs every 4 (four) hours as needed for Wheezing. Every 4-6 hours PRN  Dispense: 18 g; Refill: 6  - budesonide-formoterol 160-4.5 mcg (SYMBICORT) 160-4.5 mcg/actuation HFAA; Inhale 2 puffs into the lungs every 12 (twelve) hours. Twice a day  Dispense: 1 Inhaler; Refill: 6  - clopidogreL (PLAVIX) 75 mg tablet; Take 1 tablet (75 mg total) by mouth once daily. Need office visit before next refill, last seen 1/2019. This will be the LAST Rx if visit is not made.  Dispense: 90 tablet; Refill: 0  - metoprolol succinate (TOPROL-XL) 50 MG 24 hr tablet; Take 1 tablet (50 mg total) by mouth every evening. DO NOT CRUSH OR CHEW; SWALLOW WHOLE. Need office visit before next refill, last seen 1/2019. This will be the LAST Rx if visit is not made.  Dispense: 30 tablet; Refill: 0    5. Hyperlipidemia, unspecified hyperlipidemia type  Await lab results  - Comprehensive metabolic panel; Future  - Lipid panel; Future  - rosuvastatin (CRESTOR) 20 MG tablet; Take 1 tablet (20 mg total) by mouth once daily. Need office visit before next refill, last seen 1/2019. This will be the LAST Rx if visit is not made.  Dispense: 30 tablet; Refill: 0    6. PAD (peripheral artery disease)  Asymptomatic  - albuterol (PROVENTIL HFA) 90 mcg/actuation inhaler; Inhale 2 puffs into the lungs every 4 (four) hours as needed for Wheezing. Every 4-6 hours PRN  Dispense: 18 g; Refill: 6  - budesonide-formoterol 160-4.5 mcg (SYMBICORT) 160-4.5 mcg/actuation HFAA; Inhale 2 puffs into the lungs every 12 (twelve) hours. Twice a day  Dispense: 1 Inhaler; Refill: 6  - clopidogreL (PLAVIX) 75 mg tablet; Take 1 tablet (75 mg total) by mouth once daily. Need office visit before next refill, last seen 1/2019. This will be the LAST Rx if visit is not made.  Dispense: 90 tablet; Refill: 0  - rosuvastatin (CRESTOR) 20 MG tablet; Take 1 tablet (20 mg  total) by mouth once daily. Need office visit before next refill, last seen 1/2019. This will be the LAST Rx if visit is not made.  Dispense: 30 tablet; Refill: 0    7. S/P CABG x 3, 2007  - rosuvastatin (CRESTOR) 20 MG tablet; Take 1 tablet (20 mg total) by mouth once daily. Need office visit before next refill, last seen 1/2019. This will be the LAST Rx if visit is not made.  Dispense: 30 tablet; Refill: 0    8. Screening for cancer  - CT Chest Lung Screening Low Dose; Future    9. Colorectal neoplasm  Check CT abdomen and get GI consult  - CT Abdomen Pelvis With Contrast; Future    10. Unintentional weight loss  - CT Abdomen Pelvis With Contrast; Future-EMERGENT  - PSA, Screening; Future  - CBC auto differential; Future  - Ambulatory referral/consult to Gastroenterology; Future    11. Anemia, unspecified type  - CBC auto differential; Future  - Iron and TIBC; Future  - Ferritin; Future    12. Prostate cancer screening  - PSA, Screening; Future    13. Personal history of nicotine dependence   - CT Chest Lung Screening Low Dose; Future  - albuterol (PROVENTIL HFA) 90 mcg/actuation inhaler; Inhale 2 puffs into the lungs every 4 (four) hours as needed for Wheezing. Every 4-6 hours PRN  Dispense: 18 g; Refill: 6  - budesonide-formoterol 160-4.5 mcg (SYMBICORT) 160-4.5 mcg/actuation HFAA; Inhale 2 puffs into the lungs every 12 (twelve) hours. Twice a day  Dispense: 1 Inhaler; Refill: 6    14. Coronary artery disease involving native coronary artery without angina pectoris, unspecified whether native or transplanted heart  Asymptomatic  - clopidogreL (PLAVIX) 75 mg tablet; Take 1 tablet (75 mg total) by mouth once daily. Need office visit before next refill, last seen 1/2019. This will be the LAST Rx if visit is not made.  Dispense: 90 tablet; Refill: 0  - rosuvastatin (CRESTOR) 20 MG tablet; Take 1 tablet (20 mg total) by mouth once daily. Need office visit before next refill, last seen 1/2019. This will be the LAST Rx  if visit is not made.  Dispense: 30 tablet; Refill: 0    15. Reactive depression  Well controlled on low-dose Lexapro  - escitalopram oxalate (LEXAPRO) 10 MG tablet; Take 1 tablet (10 mg total) by mouth once daily.  Dispense: 90 tablet; Refill: 3    16. Obesity (BMI 30.0-34.9), today 30.7

## 2020-04-01 ENCOUNTER — HOSPITAL ENCOUNTER (OUTPATIENT)
Dept: RADIOLOGY | Facility: HOSPITAL | Age: 65
Discharge: HOME | End: 2020-04-01
Attending: FAMILY MEDICINE
Payer: MEDICARE

## 2020-04-01 DIAGNOSIS — R63.4 UNINTENTIONAL WEIGHT LOSS: ICD-10-CM

## 2020-04-01 DIAGNOSIS — Z12.9 SCREENING FOR CANCER: ICD-10-CM

## 2020-04-01 DIAGNOSIS — Z86.010 HISTORY OF ADENOMATOUS POLYP OF COLON: ICD-10-CM

## 2020-04-01 DIAGNOSIS — Z87.891 PERSONAL HISTORY OF NICOTINE DEPENDENCE: ICD-10-CM

## 2020-04-01 DIAGNOSIS — D49.0 COLORECTAL NEOPLASM: ICD-10-CM

## 2020-04-01 DIAGNOSIS — R19.4 CHANGE IN BOWEL HABITS: ICD-10-CM

## 2020-04-01 PROCEDURE — G0297 LDCT FOR LUNG CA SCREEN: HCPCS | Mod: 26,HCNC,, | Performed by: RADIOLOGY

## 2020-04-01 PROCEDURE — G0297 CT CHEST LUNG SCREENING LOW DOSE: ICD-10-PCS | Mod: 26,HCNC,, | Performed by: RADIOLOGY

## 2020-04-01 PROCEDURE — 74177 CT ABD & PELVIS W/CONTRAST: CPT | Mod: 26,HCNC,, | Performed by: RADIOLOGY

## 2020-04-01 PROCEDURE — 74177 CT ABD & PELVIS W/CONTRAST: CPT | Mod: TC,HCNC

## 2020-04-01 PROCEDURE — 74177 CT ABDOMEN PELVIS WITH CONTRAST: ICD-10-PCS | Mod: 26,HCNC,, | Performed by: RADIOLOGY

## 2020-04-01 PROCEDURE — 25500020 PHARM REV CODE 255: Mod: HCNC | Performed by: FAMILY MEDICINE

## 2020-04-01 PROCEDURE — G0297 LDCT FOR LUNG CA SCREEN: HCPCS | Mod: TC,HCNC

## 2020-04-01 RX ADMIN — IOHEXOL 1000 ML: 9 SOLUTION ORAL at 08:04

## 2020-04-01 RX ADMIN — IOHEXOL 100 ML: 350 INJECTION, SOLUTION INTRAVENOUS at 08:04

## 2020-04-16 ENCOUNTER — TELEPHONE (OUTPATIENT)
Dept: GASTROENTEROLOGY | Facility: CLINIC | Age: 65
End: 2020-04-16

## 2020-05-03 DIAGNOSIS — E78.5 HYPERLIPIDEMIA, UNSPECIFIED HYPERLIPIDEMIA TYPE: ICD-10-CM

## 2020-05-03 DIAGNOSIS — I73.9 PAD (PERIPHERAL ARTERY DISEASE): ICD-10-CM

## 2020-05-03 DIAGNOSIS — Z95.1 S/P CABG X 3: ICD-10-CM

## 2020-05-03 DIAGNOSIS — I25.10 CORONARY ARTERY DISEASE INVOLVING NATIVE CORONARY ARTERY WITHOUT ANGINA PECTORIS, UNSPECIFIED WHETHER NATIVE OR TRANSPLANTED HEART: ICD-10-CM

## 2020-05-04 RX ORDER — ROSUVASTATIN CALCIUM 20 MG/1
TABLET, COATED ORAL
Qty: 30 TABLET | Refills: 10 | Status: SHIPPED | OUTPATIENT
Start: 2020-05-04 | End: 2021-06-28

## 2020-05-08 DIAGNOSIS — I10 ESSENTIAL HYPERTENSION: ICD-10-CM

## 2020-05-08 RX ORDER — METOPROLOL SUCCINATE 50 MG/1
TABLET, EXTENDED RELEASE ORAL
Qty: 30 TABLET | Refills: 10 | Status: SHIPPED | OUTPATIENT
Start: 2020-05-08 | End: 2021-02-10 | Stop reason: SDUPTHER

## 2020-05-12 ENCOUNTER — PATIENT OUTREACH (OUTPATIENT)
Dept: ADMINISTRATIVE | Facility: OTHER | Age: 65
End: 2020-05-12

## 2021-01-31 DIAGNOSIS — I10 ESSENTIAL HYPERTENSION: ICD-10-CM

## 2021-01-31 DIAGNOSIS — G47.00 INSOMNIA, UNSPECIFIED TYPE: ICD-10-CM

## 2021-01-31 DIAGNOSIS — I25.10 CORONARY ARTERY DISEASE, ANGINA PRESENCE UNSPECIFIED, UNSPECIFIED VESSEL OR LESION TYPE, UNSPECIFIED WHETHER NATIVE OR TRANSPLANTED HEART: ICD-10-CM

## 2021-01-31 DIAGNOSIS — E78.5 HYPERLIPIDEMIA, UNSPECIFIED HYPERLIPIDEMIA TYPE: Primary | ICD-10-CM

## 2021-02-01 RX ORDER — TRAZODONE HYDROCHLORIDE 50 MG/1
TABLET ORAL
Qty: 30 TABLET | Refills: 0 | Status: SHIPPED | OUTPATIENT
Start: 2021-02-01 | End: 2022-06-15 | Stop reason: SDUPTHER

## 2021-02-04 NOTE — Clinical Note
Patient remains tobacco free since 5/30/2018. Patient is using strategies previously discussed. The patient remains on the prescribed tobacco cessation medication regimen of 21 mg nicotine patch QD & Wellbutrin 150 mg BId without any negative side effects at this time. The patient will continue to come to the clinic for additional support and encouragement.  
Negative

## 2021-02-10 DIAGNOSIS — I10 ESSENTIAL HYPERTENSION: ICD-10-CM

## 2021-02-10 RX ORDER — METOPROLOL SUCCINATE 50 MG/1
50 TABLET, EXTENDED RELEASE ORAL NIGHTLY
Qty: 90 TABLET | Refills: 0 | Status: SHIPPED | OUTPATIENT
Start: 2021-02-10 | End: 2021-07-13

## 2021-03-11 ENCOUNTER — TELEPHONE (OUTPATIENT)
Dept: FAMILY MEDICINE | Facility: CLINIC | Age: 66
End: 2021-03-11

## 2021-03-11 DIAGNOSIS — Z12.2 ENCOUNTER FOR SCREENING FOR MALIGNANT NEOPLASM OF RESPIRATORY ORGANS: ICD-10-CM

## 2021-03-23 ENCOUNTER — PES CALL (OUTPATIENT)
Dept: ADMINISTRATIVE | Facility: CLINIC | Age: 66
End: 2021-03-23

## 2021-03-30 ENCOUNTER — OFFICE VISIT (OUTPATIENT)
Dept: FAMILY MEDICINE | Facility: CLINIC | Age: 66
End: 2021-03-30
Attending: FAMILY MEDICINE
Payer: MEDICARE

## 2021-03-30 VITALS
BODY MASS INDEX: 28.24 KG/M2 | WEIGHT: 186.31 LBS | HEART RATE: 75 BPM | DIASTOLIC BLOOD PRESSURE: 60 MMHG | HEIGHT: 68 IN | OXYGEN SATURATION: 94 % | SYSTOLIC BLOOD PRESSURE: 110 MMHG | TEMPERATURE: 98 F

## 2021-03-30 DIAGNOSIS — E78.5 HYPERLIPIDEMIA, UNSPECIFIED HYPERLIPIDEMIA TYPE: ICD-10-CM

## 2021-03-30 DIAGNOSIS — Z95.1 S/P CABG X 3: ICD-10-CM

## 2021-03-30 DIAGNOSIS — F17.200 SMOKER: ICD-10-CM

## 2021-03-30 DIAGNOSIS — Z86.010 ENCOUNTER FOR COLONOSCOPY DUE TO HISTORY OF ADENOMATOUS COLONIC POLYPS: ICD-10-CM

## 2021-03-30 DIAGNOSIS — I10 ESSENTIAL HYPERTENSION: ICD-10-CM

## 2021-03-30 DIAGNOSIS — I73.9 PAD (PERIPHERAL ARTERY DISEASE): ICD-10-CM

## 2021-03-30 DIAGNOSIS — I25.10 CORONARY ARTERY DISEASE, ANGINA PRESENCE UNSPECIFIED, UNSPECIFIED VESSEL OR LESION TYPE, UNSPECIFIED WHETHER NATIVE OR TRANSPLANTED HEART: ICD-10-CM

## 2021-03-30 DIAGNOSIS — Z12.11 ENCOUNTER FOR COLONOSCOPY DUE TO HISTORY OF ADENOMATOUS COLONIC POLYPS: ICD-10-CM

## 2021-03-30 DIAGNOSIS — Z12.5 PROSTATE CANCER SCREENING: ICD-10-CM

## 2021-03-30 DIAGNOSIS — I49.9 IRREGULAR HEART BEAT: ICD-10-CM

## 2021-03-30 DIAGNOSIS — Z00.00 ENCOUNTER FOR PREVENTIVE HEALTH EXAMINATION: Primary | ICD-10-CM

## 2021-03-30 DIAGNOSIS — H61.21 HEARING LOSS DUE TO CERUMEN IMPACTION, RIGHT: ICD-10-CM

## 2021-03-30 PROCEDURE — 3288F FALL RISK ASSESSMENT DOCD: CPT | Mod: CPTII,S$GLB,, | Performed by: FAMILY MEDICINE

## 2021-03-30 PROCEDURE — 93010 EKG 12-LEAD: ICD-10-PCS | Mod: S$GLB,,, | Performed by: INTERNAL MEDICINE

## 2021-03-30 PROCEDURE — 3008F PR BODY MASS INDEX (BMI) DOCUMENTED: ICD-10-PCS | Mod: CPTII,S$GLB,, | Performed by: FAMILY MEDICINE

## 2021-03-30 PROCEDURE — 1126F AMNT PAIN NOTED NONE PRSNT: CPT | Mod: S$GLB,,, | Performed by: FAMILY MEDICINE

## 2021-03-30 PROCEDURE — 99397 PER PM REEVAL EST PAT 65+ YR: CPT | Mod: 25,S$GLB,, | Performed by: FAMILY MEDICINE

## 2021-03-30 PROCEDURE — 99999 PR PBB SHADOW E&M-EST. PATIENT-LVL V: ICD-10-PCS | Mod: PBBFAC,,, | Performed by: FAMILY MEDICINE

## 2021-03-30 PROCEDURE — 93010 ELECTROCARDIOGRAM REPORT: CPT | Mod: S$GLB,,, | Performed by: INTERNAL MEDICINE

## 2021-03-30 PROCEDURE — 1101F PT FALLS ASSESS-DOCD LE1/YR: CPT | Mod: CPTII,S$GLB,, | Performed by: FAMILY MEDICINE

## 2021-03-30 PROCEDURE — 99999 PR PBB SHADOW E&M-EST. PATIENT-LVL V: CPT | Mod: PBBFAC,,, | Performed by: FAMILY MEDICINE

## 2021-03-30 PROCEDURE — 3008F BODY MASS INDEX DOCD: CPT | Mod: CPTII,S$GLB,, | Performed by: FAMILY MEDICINE

## 2021-03-30 PROCEDURE — 93005 EKG 12-LEAD: ICD-10-PCS | Mod: S$GLB,,, | Performed by: FAMILY MEDICINE

## 2021-03-30 PROCEDURE — 69209 PR REMOVAL IMPACTED CERUMEN USING IRRIGATION/LAVAGE, UNILATERAL: ICD-10-PCS | Mod: RT,S$GLB,, | Performed by: FAMILY MEDICINE

## 2021-03-30 PROCEDURE — 1101F PR PT FALLS ASSESS DOC 0-1 FALLS W/OUT INJ PAST YR: ICD-10-PCS | Mod: CPTII,S$GLB,, | Performed by: FAMILY MEDICINE

## 2021-03-30 PROCEDURE — 3074F PR MOST RECENT SYSTOLIC BLOOD PRESSURE < 130 MM HG: ICD-10-PCS | Mod: CPTII,S$GLB,, | Performed by: FAMILY MEDICINE

## 2021-03-30 PROCEDURE — 1126F PR PAIN SEVERITY QUANTIFIED, NO PAIN PRESENT: ICD-10-PCS | Mod: S$GLB,,, | Performed by: FAMILY MEDICINE

## 2021-03-30 PROCEDURE — 3078F DIAST BP <80 MM HG: CPT | Mod: CPTII,S$GLB,, | Performed by: FAMILY MEDICINE

## 2021-03-30 PROCEDURE — 93005 ELECTROCARDIOGRAM TRACING: CPT | Mod: S$GLB,,, | Performed by: FAMILY MEDICINE

## 2021-03-30 PROCEDURE — 99499 RISK ADDL DX/OHS AUDIT: ICD-10-PCS | Mod: S$GLB,,, | Performed by: FAMILY MEDICINE

## 2021-03-30 PROCEDURE — 3078F PR MOST RECENT DIASTOLIC BLOOD PRESSURE < 80 MM HG: ICD-10-PCS | Mod: CPTII,S$GLB,, | Performed by: FAMILY MEDICINE

## 2021-03-30 PROCEDURE — 99499 UNLISTED E&M SERVICE: CPT | Mod: S$GLB,,, | Performed by: FAMILY MEDICINE

## 2021-03-30 PROCEDURE — 99397 PR PREVENTIVE VISIT,EST,65 & OVER: ICD-10-PCS | Mod: 25,S$GLB,, | Performed by: FAMILY MEDICINE

## 2021-03-30 PROCEDURE — 3074F SYST BP LT 130 MM HG: CPT | Mod: CPTII,S$GLB,, | Performed by: FAMILY MEDICINE

## 2021-03-30 PROCEDURE — 3288F PR FALLS RISK ASSESSMENT DOCUMENTED: ICD-10-PCS | Mod: CPTII,S$GLB,, | Performed by: FAMILY MEDICINE

## 2021-03-30 PROCEDURE — 69209 REMOVE IMPACTED EAR WAX UNI: CPT | Mod: RT,S$GLB,, | Performed by: FAMILY MEDICINE

## 2021-03-30 RX ORDER — VARENICLINE TARTRATE 0.5 (11)-1
KIT ORAL
Qty: 1 PACKAGE | Refills: 0 | Status: SHIPPED | OUTPATIENT
Start: 2021-03-30 | End: 2022-06-15

## 2021-04-01 ENCOUNTER — HOSPITAL ENCOUNTER (OUTPATIENT)
Dept: RADIOLOGY | Facility: HOSPITAL | Age: 66
Discharge: HOME | End: 2021-04-01
Attending: FAMILY MEDICINE
Payer: MEDICARE

## 2021-04-01 ENCOUNTER — IMMUNIZATION (OUTPATIENT)
Dept: PRIMARY CARE CLINIC | Facility: CLINIC | Age: 66
End: 2021-04-01
Payer: MEDICARE

## 2021-04-01 DIAGNOSIS — Z12.2 ENCOUNTER FOR SCREENING FOR MALIGNANT NEOPLASM OF RESPIRATORY ORGANS: ICD-10-CM

## 2021-04-01 DIAGNOSIS — Z23 NEED FOR VACCINATION: Primary | ICD-10-CM

## 2021-04-01 PROCEDURE — 0001A COVID-19, MRNA, LNP-S, PF, 30 MCG/0.3 ML DOSE VACCINE: CPT | Mod: CV19,S$GLB,, | Performed by: FAMILY MEDICINE

## 2021-04-01 PROCEDURE — 71271 CT CHEST LUNG SCREENING LOW DOSE: ICD-10-PCS | Mod: 26,,, | Performed by: RADIOLOGY

## 2021-04-01 PROCEDURE — 71271 CT THORAX LUNG CANCER SCR C-: CPT | Mod: TC

## 2021-04-01 PROCEDURE — 0001A COVID-19, MRNA, LNP-S, PF, 30 MCG/0.3 ML DOSE VACCINE: ICD-10-PCS | Mod: CV19,S$GLB,, | Performed by: FAMILY MEDICINE

## 2021-04-01 PROCEDURE — 71271 CT THORAX LUNG CANCER SCR C-: CPT | Mod: 26,,, | Performed by: RADIOLOGY

## 2021-04-01 PROCEDURE — 91300 COVID-19, MRNA, LNP-S, PF, 30 MCG/0.3 ML DOSE VACCINE: ICD-10-PCS | Mod: S$GLB,,, | Performed by: FAMILY MEDICINE

## 2021-04-01 PROCEDURE — 91300 COVID-19, MRNA, LNP-S, PF, 30 MCG/0.3 ML DOSE VACCINE: CPT | Mod: S$GLB,,, | Performed by: FAMILY MEDICINE

## 2021-04-09 ENCOUNTER — LAB VISIT (OUTPATIENT)
Dept: LAB | Facility: HOSPITAL | Age: 66
End: 2021-04-09
Attending: FAMILY MEDICINE
Payer: MEDICARE

## 2021-04-09 DIAGNOSIS — Z12.5 PROSTATE CANCER SCREENING: ICD-10-CM

## 2021-04-09 DIAGNOSIS — I25.10 CORONARY ARTERY DISEASE, ANGINA PRESENCE UNSPECIFIED, UNSPECIFIED VESSEL OR LESION TYPE, UNSPECIFIED WHETHER NATIVE OR TRANSPLANTED HEART: ICD-10-CM

## 2021-04-09 DIAGNOSIS — Z00.00 ENCOUNTER FOR PREVENTIVE HEALTH EXAMINATION: ICD-10-CM

## 2021-04-09 DIAGNOSIS — E78.5 HYPERLIPIDEMIA, UNSPECIFIED HYPERLIPIDEMIA TYPE: ICD-10-CM

## 2021-04-09 DIAGNOSIS — I10 ESSENTIAL HYPERTENSION: ICD-10-CM

## 2021-04-09 LAB
ALBUMIN SERPL BCP-MCNC: 3.5 G/DL (ref 3.5–5.2)
ALP SERPL-CCNC: 95 U/L (ref 55–135)
ALT SERPL W/O P-5'-P-CCNC: 5 U/L (ref 10–44)
ANION GAP SERPL CALC-SCNC: 8 MMOL/L (ref 8–16)
AST SERPL-CCNC: 15 U/L (ref 10–40)
BASOPHILS # BLD AUTO: 0.03 K/UL (ref 0–0.2)
BASOPHILS NFR BLD: 0.4 % (ref 0–1.9)
BILIRUB SERPL-MCNC: 0.4 MG/DL (ref 0.1–1)
BUN SERPL-MCNC: 11 MG/DL (ref 8–23)
CALCIUM SERPL-MCNC: 9.3 MG/DL (ref 8.7–10.5)
CHLORIDE SERPL-SCNC: 105 MMOL/L (ref 95–110)
CHOLEST SERPL-MCNC: 130 MG/DL (ref 120–199)
CHOLEST/HDLC SERPL: 3.3 {RATIO} (ref 2–5)
CO2 SERPL-SCNC: 26 MMOL/L (ref 23–29)
COMPLEXED PSA SERPL-MCNC: 1.6 NG/ML (ref 0–4)
CREAT SERPL-MCNC: 0.9 MG/DL (ref 0.5–1.4)
DIFFERENTIAL METHOD: ABNORMAL
EOSINOPHIL # BLD AUTO: 0.1 K/UL (ref 0–0.5)
EOSINOPHIL NFR BLD: 1.2 % (ref 0–8)
ERYTHROCYTE [DISTWIDTH] IN BLOOD BY AUTOMATED COUNT: 12.9 % (ref 11.5–14.5)
EST. GFR  (AFRICAN AMERICAN): >60 ML/MIN/1.73 M^2
EST. GFR  (NON AFRICAN AMERICAN): >60 ML/MIN/1.73 M^2
GLUCOSE SERPL-MCNC: 111 MG/DL (ref 70–110)
HCT VFR BLD AUTO: 49 % (ref 40–54)
HDLC SERPL-MCNC: 40 MG/DL (ref 40–75)
HDLC SERPL: 30.8 % (ref 20–50)
HGB BLD-MCNC: 16.2 G/DL (ref 14–18)
IMM GRANULOCYTES # BLD AUTO: 0.03 K/UL (ref 0–0.04)
IMM GRANULOCYTES NFR BLD AUTO: 0.4 % (ref 0–0.5)
LDLC SERPL CALC-MCNC: 64.4 MG/DL (ref 63–159)
LYMPHOCYTES # BLD AUTO: 1.1 K/UL (ref 1–4.8)
LYMPHOCYTES NFR BLD: 14.2 % (ref 18–48)
MCH RBC QN AUTO: 32.5 PG (ref 27–31)
MCHC RBC AUTO-ENTMCNC: 33.1 G/DL (ref 32–36)
MCV RBC AUTO: 98 FL (ref 82–98)
MONOCYTES # BLD AUTO: 0.5 K/UL (ref 0.3–1)
MONOCYTES NFR BLD: 6.6 % (ref 4–15)
NEUTROPHILS # BLD AUTO: 5.7 K/UL (ref 1.8–7.7)
NEUTROPHILS NFR BLD: 77.2 % (ref 38–73)
NONHDLC SERPL-MCNC: 90 MG/DL
NRBC BLD-RTO: 0 /100 WBC
PLATELET # BLD AUTO: 178 K/UL (ref 150–450)
PMV BLD AUTO: 10.6 FL (ref 9.2–12.9)
POTASSIUM SERPL-SCNC: 4 MMOL/L (ref 3.5–5.1)
PROT SERPL-MCNC: 7.9 G/DL (ref 6–8.4)
RBC # BLD AUTO: 4.98 M/UL (ref 4.6–6.2)
SODIUM SERPL-SCNC: 139 MMOL/L (ref 136–145)
TRIGL SERPL-MCNC: 128 MG/DL (ref 30–150)
WBC # BLD AUTO: 7.37 K/UL (ref 3.9–12.7)

## 2021-04-09 PROCEDURE — 36415 COLL VENOUS BLD VENIPUNCTURE: CPT | Mod: PO | Performed by: FAMILY MEDICINE

## 2021-04-09 PROCEDURE — 84153 ASSAY OF PSA TOTAL: CPT | Performed by: FAMILY MEDICINE

## 2021-04-09 PROCEDURE — 80061 LIPID PANEL: CPT | Performed by: FAMILY MEDICINE

## 2021-04-09 PROCEDURE — 85025 COMPLETE CBC W/AUTO DIFF WBC: CPT | Performed by: FAMILY MEDICINE

## 2021-04-09 PROCEDURE — 80053 COMPREHEN METABOLIC PANEL: CPT | Performed by: FAMILY MEDICINE

## 2021-04-22 ENCOUNTER — IMMUNIZATION (OUTPATIENT)
Dept: PRIMARY CARE CLINIC | Facility: CLINIC | Age: 66
End: 2021-04-22

## 2021-04-22 DIAGNOSIS — Z23 NEED FOR VACCINATION: Primary | ICD-10-CM

## 2021-04-22 PROCEDURE — 91300 COVID-19, MRNA, LNP-S, PF, 30 MCG/0.3 ML DOSE VACCINE: CPT | Mod: S$GLB,,, | Performed by: PHYSICIAN ASSISTANT

## 2021-04-22 PROCEDURE — 0002A COVID-19, MRNA, LNP-S, PF, 30 MCG/0.3 ML DOSE VACCINE: CPT | Mod: CV19,S$GLB,, | Performed by: PHYSICIAN ASSISTANT

## 2021-04-22 PROCEDURE — 0002A COVID-19, MRNA, LNP-S, PF, 30 MCG/0.3 ML DOSE VACCINE: ICD-10-PCS | Mod: CV19,S$GLB,, | Performed by: PHYSICIAN ASSISTANT

## 2021-04-22 PROCEDURE — 91300 COVID-19, MRNA, LNP-S, PF, 30 MCG/0.3 ML DOSE VACCINE: ICD-10-PCS | Mod: S$GLB,,, | Performed by: PHYSICIAN ASSISTANT

## 2021-06-26 DIAGNOSIS — Z95.1 S/P CABG X 3: ICD-10-CM

## 2021-06-26 DIAGNOSIS — E78.5 HYPERLIPIDEMIA, UNSPECIFIED HYPERLIPIDEMIA TYPE: ICD-10-CM

## 2021-06-26 DIAGNOSIS — I25.10 CORONARY ARTERY DISEASE INVOLVING NATIVE CORONARY ARTERY WITHOUT ANGINA PECTORIS, UNSPECIFIED WHETHER NATIVE OR TRANSPLANTED HEART: ICD-10-CM

## 2021-06-26 DIAGNOSIS — I73.9 PAD (PERIPHERAL ARTERY DISEASE): ICD-10-CM

## 2021-06-29 RX ORDER — ROSUVASTATIN CALCIUM 20 MG/1
20 TABLET, COATED ORAL DAILY
Qty: 90 TABLET | Refills: 2 | Status: SHIPPED | OUTPATIENT
Start: 2021-06-29 | End: 2022-04-12

## 2021-07-10 DIAGNOSIS — I10 ESSENTIAL HYPERTENSION: ICD-10-CM

## 2021-07-13 RX ORDER — METOPROLOL SUCCINATE 50 MG/1
TABLET, EXTENDED RELEASE ORAL
Qty: 90 TABLET | Refills: 2 | Status: SHIPPED | OUTPATIENT
Start: 2021-07-13 | End: 2022-02-21

## 2021-07-26 ENCOUNTER — PES CALL (OUTPATIENT)
Dept: ADMINISTRATIVE | Facility: CLINIC | Age: 66
End: 2021-07-26

## 2022-01-11 ENCOUNTER — IMMUNIZATION (OUTPATIENT)
Dept: PRIMARY CARE CLINIC | Facility: CLINIC | Age: 67
End: 2022-01-11
Payer: MEDICARE

## 2022-01-11 DIAGNOSIS — Z23 NEED FOR VACCINATION: Primary | ICD-10-CM

## 2022-01-11 PROCEDURE — 91300 COVID-19, MRNA, LNP-S, PF, 30 MCG/0.3 ML DOSE VACCINE: CPT | Mod: S$GLB,,, | Performed by: FAMILY MEDICINE

## 2022-01-11 PROCEDURE — 0004A COVID-19, MRNA, LNP-S, PF, 30 MCG/0.3 ML DOSE VACCINE: ICD-10-PCS | Mod: S$GLB,,, | Performed by: FAMILY MEDICINE

## 2022-01-11 PROCEDURE — 91300 COVID-19, MRNA, LNP-S, PF, 30 MCG/0.3 ML DOSE VACCINE: ICD-10-PCS | Mod: S$GLB,,, | Performed by: FAMILY MEDICINE

## 2022-01-11 PROCEDURE — 0004A COVID-19, MRNA, LNP-S, PF, 30 MCG/0.3 ML DOSE VACCINE: CPT | Mod: S$GLB,,, | Performed by: FAMILY MEDICINE

## 2022-02-19 DIAGNOSIS — I10 ESSENTIAL HYPERTENSION: ICD-10-CM

## 2022-02-19 NOTE — TELEPHONE ENCOUNTER
Care Due:                  Date            Visit Type   Department     Provider  --------------------------------------------------------------------------------                                EP -                              PRIMARY      [facility]  Last Visit: 03-      CARE ([facility])   MEDICINE       Genaro Womack  Next Visit: None Scheduled  None         None Found                                                            Last  Test          Frequency    Reason                     Performed    Due Date  --------------------------------------------------------------------------------    Office Visit  12 months..  albuterol, clopidogreL,    03- 03-                             escitalopram,                             rosuvastatin, traZODone..    CBC.........  12 months..  clopidogreL..............  04- 04-    CMP.........  12 months..  rosuvastatin.............  04- 04-    Lipid Panel.  12 months..  rosuvastatin.............  04- 04-    Powered by [employer]. Reference number: [id number].   2/19/2022 1:35:25 PM CST

## 2022-02-21 RX ORDER — METOPROLOL SUCCINATE 50 MG/1
50 TABLET, EXTENDED RELEASE ORAL NIGHTLY
Qty: 90 TABLET | Refills: 0 | Status: SHIPPED | OUTPATIENT
Start: 2022-02-21 | End: 2022-06-15 | Stop reason: SDUPTHER

## 2022-02-21 NOTE — TELEPHONE ENCOUNTER
Refill Authorization Note   Srinivasa Oliver  is requesting a refill authorization.  Brief Assessment and Rationale for Refill:  Approve    -Medication-Related Problems Identified:   Requires labs  Requires appointment  Medication Therapy Plan:  Needs OV; Labs CBC, CMP, Lipid Panel)    Medication Reconciliation Completed: No   Comments:   --->Care Gap information included below if applicable.   Orders Placed This Encounter    metoprolol succinate (TOPROL-XL) 50 MG 24 hr tablet      Requested Prescriptions   Signed Prescriptions Disp Refills    metoprolol succinate (TOPROL-XL) 50 MG 24 hr tablet 90 tablet 0     Sig: Take 1 tablet (50 mg total) by mouth every evening.       Cardiovascular:  Beta Blockers Passed - 2/19/2022  1:35 PM        Passed - Patient is at least 18 years old        Passed - Last BP in normal range within 360 days     BP Readings from Last 1 Encounters:   03/30/21 110/60               Passed - Last Heart Rate in normal range within 360 days     Pulse Readings from Last 1 Encounters:   03/30/21 75              Passed - Valid encounter within last 15 months     Recent Visits  Date Type Provider Dept   03/30/21 Office Visit Genaro Womack MD [facility]   03/30/20 Office Visit Genaro Womack MD [facility]   Showing recent visits within past 720 days and meeting all other requirements  Future Appointments  No visits were found meeting these conditions.  Showing future appointments within next 150 days and meeting all other requirements                    Appointments  past 12m or future 3m with PCP    Date Provider   Last Visit   3/30/2021 Genaro Womack MD   Next Visit   Visit date not found Genaro Womack MD   ED visits in past 90 days: 0     Note composed:4:13 PM 02/21/2022

## 2022-02-21 NOTE — TELEPHONE ENCOUNTER
Provider Staff:     Action is required for this patient.   Please see care gap opportunities below in Care Due Message: Needs OV; Labs CBC, CMP, Lipid Panel) .     Thanks!  [facility]     Appointments      Date Provider   Last Visit   3/30/2021 Genaro Womack MD   Next Visit   Visit date not found Genaro Womack MD     Note composed:4:13 PM 02/21/2022

## 2022-04-11 DIAGNOSIS — I25.10 CORONARY ARTERY DISEASE INVOLVING NATIVE CORONARY ARTERY WITHOUT ANGINA PECTORIS, UNSPECIFIED WHETHER NATIVE OR TRANSPLANTED HEART: ICD-10-CM

## 2022-04-11 DIAGNOSIS — I73.9 PAD (PERIPHERAL ARTERY DISEASE): ICD-10-CM

## 2022-04-11 DIAGNOSIS — E78.5 HYPERLIPIDEMIA, UNSPECIFIED HYPERLIPIDEMIA TYPE: ICD-10-CM

## 2022-04-11 DIAGNOSIS — Z95.1 S/P CABG X 3: ICD-10-CM

## 2022-04-11 NOTE — TELEPHONE ENCOUNTER
No new care gaps identified.  Powered by [employer]. Reference number: [id number].   4/11/2022 11:29:14 AM CDT

## 2022-04-12 RX ORDER — ROSUVASTATIN CALCIUM 20 MG/1
TABLET, COATED ORAL
Qty: 90 TABLET | Refills: 0 | Status: SHIPPED | OUTPATIENT
Start: 2022-04-12 | End: 2022-06-15 | Stop reason: SDUPTHER

## 2022-04-12 NOTE — TELEPHONE ENCOUNTER
Refill Routing Note   Medication(s) are not appropriate for processing by [facility] for the following reason(s):      - Required laboratory values are outdated    [facility] action(s):  Defer Medication-related problems identified:   Requires labs  Requires appointment     Medication Therapy Plan: Annual OV almost due.  Medication reconciliation completed: No     Appointments  past 12m or future 3m with PCP    Date Provider   Last Visit   3/30/2021 Genaro Womack MD   Next Visit   Visit date not found Genaro Womack MD   ED visits in past 90 days: 0        Note composed:12:14 PM 04/12/2022

## 2022-05-12 ENCOUNTER — TELEPHONE (OUTPATIENT)
Dept: FAMILY MEDICINE | Facility: CLINIC | Age: 67
End: 2022-05-12
Payer: MEDICARE

## 2022-05-12 NOTE — TELEPHONE ENCOUNTER
I called the patient to schedule their free one hour Enhanced Annual Wellness Visit with   Angela Sykes NP.

## 2022-06-14 ENCOUNTER — TELEPHONE (OUTPATIENT)
Dept: FAMILY MEDICINE | Facility: CLINIC | Age: 67
End: 2022-06-14
Payer: MEDICARE

## 2022-06-14 NOTE — TELEPHONE ENCOUNTER
I called the patient to confirm his appointment that he has scheduled with Dr. Womack on 06/15/2022 at 3:40pm. No answer left voicemail to return our call.

## 2022-06-15 ENCOUNTER — OFFICE VISIT (OUTPATIENT)
Dept: FAMILY MEDICINE | Facility: CLINIC | Age: 67
End: 2022-06-15
Attending: FAMILY MEDICINE
Payer: MEDICARE

## 2022-06-15 VITALS
WEIGHT: 170.44 LBS | OXYGEN SATURATION: 92 % | BODY MASS INDEX: 25.83 KG/M2 | SYSTOLIC BLOOD PRESSURE: 106 MMHG | RESPIRATION RATE: 18 BRPM | DIASTOLIC BLOOD PRESSURE: 64 MMHG | TEMPERATURE: 98 F | HEART RATE: 74 BPM | HEIGHT: 68 IN

## 2022-06-15 DIAGNOSIS — E78.5 HYPERLIPIDEMIA, UNSPECIFIED HYPERLIPIDEMIA TYPE: ICD-10-CM

## 2022-06-15 DIAGNOSIS — Z91.89 PNEUMOCOCCAL VACCINATION INDICATED: ICD-10-CM

## 2022-06-15 DIAGNOSIS — Z12.11 ENCOUNTER FOR COLONOSCOPY DUE TO HISTORY OF ADENOMATOUS COLONIC POLYPS: ICD-10-CM

## 2022-06-15 DIAGNOSIS — I25.10 CORONARY ARTERY DISEASE INVOLVING NATIVE CORONARY ARTERY WITHOUT ANGINA PECTORIS, UNSPECIFIED WHETHER NATIVE OR TRANSPLANTED HEART: ICD-10-CM

## 2022-06-15 DIAGNOSIS — N52.9 ERECTILE DYSFUNCTION, UNSPECIFIED ERECTILE DYSFUNCTION TYPE: ICD-10-CM

## 2022-06-15 DIAGNOSIS — Z00.00 ENCOUNTER FOR PREVENTIVE HEALTH EXAMINATION: Primary | ICD-10-CM

## 2022-06-15 DIAGNOSIS — Z95.1 S/P CABG X 3: ICD-10-CM

## 2022-06-15 DIAGNOSIS — F17.200 TOBACCO DEPENDENCE: ICD-10-CM

## 2022-06-15 DIAGNOSIS — I10 ESSENTIAL HYPERTENSION: ICD-10-CM

## 2022-06-15 DIAGNOSIS — I25.10 CORONARY ARTERY DISEASE, UNSPECIFIED VESSEL OR LESION TYPE, UNSPECIFIED WHETHER ANGINA PRESENT, UNSPECIFIED WHETHER NATIVE OR TRANSPLANTED HEART: ICD-10-CM

## 2022-06-15 DIAGNOSIS — I73.9 PAD (PERIPHERAL ARTERY DISEASE): ICD-10-CM

## 2022-06-15 DIAGNOSIS — Z12.5 PROSTATE CANCER SCREENING: ICD-10-CM

## 2022-06-15 DIAGNOSIS — Z12.11 COLON CANCER SCREENING: ICD-10-CM

## 2022-06-15 DIAGNOSIS — Z86.010 ENCOUNTER FOR COLONOSCOPY DUE TO HISTORY OF ADENOMATOUS COLONIC POLYPS: ICD-10-CM

## 2022-06-15 DIAGNOSIS — I10 PRIMARY HYPERTENSION: ICD-10-CM

## 2022-06-15 DIAGNOSIS — F32.9 REACTIVE DEPRESSION: ICD-10-CM

## 2022-06-15 DIAGNOSIS — Z87.891 PERSONAL HISTORY OF NICOTINE DEPENDENCE: ICD-10-CM

## 2022-06-15 DIAGNOSIS — G47.00 INSOMNIA, UNSPECIFIED TYPE: ICD-10-CM

## 2022-06-15 PROCEDURE — 99999 PR PBB SHADOW E&M-EST. PATIENT-LVL IV: CPT | Mod: PBBFAC,,, | Performed by: FAMILY MEDICINE

## 2022-06-15 PROCEDURE — 90677 PNEUMOCOCCAL CONJUGATE VACCINE 20-VALENT: ICD-10-PCS | Mod: S$GLB,ICN,, | Performed by: FAMILY MEDICINE

## 2022-06-15 PROCEDURE — 90677 PCV20 VACCINE IM: CPT | Mod: S$GLB,ICN,, | Performed by: FAMILY MEDICINE

## 2022-06-15 PROCEDURE — 99397 PR PREVENTIVE VISIT,EST,65 & OVER: ICD-10-PCS | Mod: 25,S$GLB,, | Performed by: FAMILY MEDICINE

## 2022-06-15 PROCEDURE — 1160F PR REVIEW ALL MEDS BY PRESCRIBER/CLIN PHARMACIST DOCUMENTED: ICD-10-PCS | Mod: CPTII,S$GLB,, | Performed by: FAMILY MEDICINE

## 2022-06-15 PROCEDURE — 3008F BODY MASS INDEX DOCD: CPT | Mod: CPTII,S$GLB,, | Performed by: FAMILY MEDICINE

## 2022-06-15 PROCEDURE — 1160F RVW MEDS BY RX/DR IN RCRD: CPT | Mod: CPTII,S$GLB,, | Performed by: FAMILY MEDICINE

## 2022-06-15 PROCEDURE — G0009 PNEUMOCOCCAL CONJUGATE VACCINE 20-VALENT: ICD-10-PCS | Mod: S$GLB,ICN,, | Performed by: FAMILY MEDICINE

## 2022-06-15 PROCEDURE — G0009 ADMIN PNEUMOCOCCAL VACCINE: HCPCS | Mod: S$GLB,ICN,, | Performed by: FAMILY MEDICINE

## 2022-06-15 PROCEDURE — 1126F AMNT PAIN NOTED NONE PRSNT: CPT | Mod: CPTII,S$GLB,, | Performed by: FAMILY MEDICINE

## 2022-06-15 PROCEDURE — 3288F PR FALLS RISK ASSESSMENT DOCUMENTED: ICD-10-PCS | Mod: CPTII,S$GLB,, | Performed by: FAMILY MEDICINE

## 2022-06-15 PROCEDURE — 3074F PR MOST RECENT SYSTOLIC BLOOD PRESSURE < 130 MM HG: ICD-10-PCS | Mod: CPTII,S$GLB,, | Performed by: FAMILY MEDICINE

## 2022-06-15 PROCEDURE — 3008F PR BODY MASS INDEX (BMI) DOCUMENTED: ICD-10-PCS | Mod: CPTII,S$GLB,, | Performed by: FAMILY MEDICINE

## 2022-06-15 PROCEDURE — 1159F PR MEDICATION LIST DOCUMENTED IN MEDICAL RECORD: ICD-10-PCS | Mod: CPTII,S$GLB,, | Performed by: FAMILY MEDICINE

## 2022-06-15 PROCEDURE — 3078F DIAST BP <80 MM HG: CPT | Mod: CPTII,S$GLB,, | Performed by: FAMILY MEDICINE

## 2022-06-15 PROCEDURE — 1101F PT FALLS ASSESS-DOCD LE1/YR: CPT | Mod: CPTII,S$GLB,, | Performed by: FAMILY MEDICINE

## 2022-06-15 PROCEDURE — 1159F MED LIST DOCD IN RCRD: CPT | Mod: CPTII,S$GLB,, | Performed by: FAMILY MEDICINE

## 2022-06-15 PROCEDURE — 3288F FALL RISK ASSESSMENT DOCD: CPT | Mod: CPTII,S$GLB,, | Performed by: FAMILY MEDICINE

## 2022-06-15 PROCEDURE — 99397 PER PM REEVAL EST PAT 65+ YR: CPT | Mod: 25,S$GLB,, | Performed by: FAMILY MEDICINE

## 2022-06-15 PROCEDURE — 1101F PR PT FALLS ASSESS DOC 0-1 FALLS W/OUT INJ PAST YR: ICD-10-PCS | Mod: CPTII,S$GLB,, | Performed by: FAMILY MEDICINE

## 2022-06-15 PROCEDURE — 99999 PR PBB SHADOW E&M-EST. PATIENT-LVL IV: ICD-10-PCS | Mod: PBBFAC,,, | Performed by: FAMILY MEDICINE

## 2022-06-15 PROCEDURE — 1126F PR PAIN SEVERITY QUANTIFIED, NO PAIN PRESENT: ICD-10-PCS | Mod: CPTII,S$GLB,, | Performed by: FAMILY MEDICINE

## 2022-06-15 PROCEDURE — 3074F SYST BP LT 130 MM HG: CPT | Mod: CPTII,S$GLB,, | Performed by: FAMILY MEDICINE

## 2022-06-15 PROCEDURE — 3078F PR MOST RECENT DIASTOLIC BLOOD PRESSURE < 80 MM HG: ICD-10-PCS | Mod: CPTII,S$GLB,, | Performed by: FAMILY MEDICINE

## 2022-06-15 RX ORDER — SILDENAFIL CITRATE 20 MG/1
20 TABLET ORAL 3 TIMES DAILY
Qty: 30 TABLET | Refills: 11 | Status: SHIPPED | OUTPATIENT
Start: 2022-06-15 | End: 2022-07-13 | Stop reason: CLARIF

## 2022-06-15 RX ORDER — ALBUTEROL SULFATE 90 UG/1
2 AEROSOL, METERED RESPIRATORY (INHALATION) EVERY 4 HOURS PRN
Qty: 18 G | Refills: 6 | Status: SHIPPED | OUTPATIENT
Start: 2022-06-15 | End: 2023-10-16 | Stop reason: SDUPTHER

## 2022-06-15 RX ORDER — ESCITALOPRAM OXALATE 10 MG/1
10 TABLET ORAL DAILY
Qty: 90 TABLET | Refills: 3 | Status: SHIPPED | OUTPATIENT
Start: 2022-06-15 | End: 2023-08-14 | Stop reason: SDUPTHER

## 2022-06-15 RX ORDER — BUDESONIDE AND FORMOTEROL FUMARATE DIHYDRATE 160; 4.5 UG/1; UG/1
2 AEROSOL RESPIRATORY (INHALATION) EVERY 12 HOURS
Qty: 10.2 G | Refills: 5 | Status: SHIPPED | OUTPATIENT
Start: 2022-06-15 | End: 2022-07-13 | Stop reason: CLARIF

## 2022-06-15 RX ORDER — TRAZODONE HYDROCHLORIDE 50 MG/1
50 TABLET ORAL NIGHTLY
Qty: 90 TABLET | Refills: 3 | Status: SHIPPED | OUTPATIENT
Start: 2022-06-15 | End: 2023-10-16

## 2022-06-15 RX ORDER — CLOPIDOGREL BISULFATE 75 MG/1
75 TABLET ORAL DAILY
Qty: 90 TABLET | Refills: 3 | Status: SHIPPED | OUTPATIENT
Start: 2022-06-15 | End: 2023-08-14 | Stop reason: SDUPTHER

## 2022-06-15 RX ORDER — ROSUVASTATIN CALCIUM 20 MG/1
20 TABLET, COATED ORAL DAILY
Qty: 90 TABLET | Refills: 3 | Status: SHIPPED | OUTPATIENT
Start: 2022-06-15 | End: 2023-08-14 | Stop reason: SDUPTHER

## 2022-06-15 RX ORDER — METOPROLOL SUCCINATE 50 MG/1
50 TABLET, EXTENDED RELEASE ORAL NIGHTLY
Qty: 90 TABLET | Refills: 3 | Status: SHIPPED | OUTPATIENT
Start: 2022-06-15 | End: 2023-06-17

## 2022-06-15 NOTE — PROGRESS NOTES
Subjective:       Patient ID: Srinivasa Oliver is a 66 y.o. male.    Chief Complaint: Annual Exam    66-year-old male coming in for annual exam and follow-up of multiple medical problems.  He has a history of COPD with emphysema, adenomatous colon polyps, hyperlipidemia, hypertension, coronary artery disease status post three-vessel bypass surgery and stents, hearing loss and depression.  He is due for a pneumonia vaccine, either the Pneumovax 23 or the Prevnar 20. He has not yet had the shingles vaccine and is due for a colonoscopy, low-dose CT scan and needs refills of multiple medications.  He is requesting a prescription for sildenafil 20 mg, Revatio.  He tried Viagra in the past and found it unhelpful but a friend gave him some of the Revatio and two tablets, 40 mg, was successful.  He does not recall how long ago he tried the Viagra and I can only assume he was taking a medication that interfered such is a diuretic or beta-blocker, noting he is taking Toprol at this time.    Past Medical History:  1/28/2011: Adenomatous colon polyp  No date: Adenomatous colon polyp  No date: Anxiety  No date: Blood transfusion  No date: CHF (congestive heart failure)  No date: Emphysema of lung  No date: Hypertension  1/7/2013: S/P coronary artery stent placement  No date: Staph skin infection      Comment:  abd/ lanced x 2  No date: Ulcer    Past Surgical History:  1-: COLONOSCOPY      Comment:  Dr Choudhary, tubular adenoma  No date: CORONARY ARTERY BYPASS GRAFT  No date: CORONARY STENT PLACEMENT    Review of patient's family history indicates:  Problem: Heart disease      Relation: Mother          Age of Onset: (Not Specified)  Problem: Heart disease      Relation: Father          Age of Onset: (Not Specified)  Problem: Diabetes      Relation: Father          Age of Onset: (Not Specified)  Problem: Heart disease      Relation: Maternal Aunt          Age of Onset: (Not Specified)  Problem: No Known Problems       Relation: Sister          Age of Onset: (Not Specified)  Problem: No Known Problems      Relation: Brother          Age of Onset: (Not Specified)  Problem: No Known Problems      Relation: Daughter          Age of Onset: (Not Specified)  Problem: No Known Problems      Relation: Son          Age of Onset: (Not Specified)    Social History    Tobacco Use      Smoking status: Former Smoker        Packs/day: 0.75        Years: 42.00        Pack years: 31.5        Types: Cigarettes        Quit date: 2018        Years since quittin.0      Smokeless tobacco: Never Used      Tobacco comment: patient room 7     Alcohol use: No      Alcohol/week: 0.0 standard drinks    Drug use: No    Current Outpatient Medications on File Prior to Visit:  aspirin (ECOTRIN) 81 MG EC tablet, Take 81 mg by mouth once daily., Disp: , Rfl:   coenzyme Q10-vitamin E 100-100 mg-unit Cap, Take 200 mg by mouth once daily. Every day, Disp: , Rfl:   DOCOSAHEXANOIC ACID/EPA (FISH OIL ORAL), Take 1 capsule by mouth 2 (two) times daily as needed. Twice a day, Disp: , Rfl:   multivitamin (THERAGRAN) per tablet, Take 1 tablet by mouth once daily. Every day, Disp: , Rfl:   (DISCONTINUED) albuterol (PROVENTIL HFA) 90 mcg/actuation inhaler, Inhale 2 puffs into the lungs every 4 (four) hours as needed for Wheezing. Every 4-6 hours PRN, Disp: 18 g, Rfl: 6  (DISCONTINUED) budesonide-formoterol 160-4.5 mcg (SYMBICORT) 160-4.5 mcg/actuation HFAA, Inhale 2 puffs into the lungs every 12 (twelve) hours. Twice a day, Disp: 1 Inhaler, Rfl: 6  (DISCONTINUED) clopidogreL (PLAVIX) 75 mg tablet, Take 1 tablet (75 mg total) by mouth once daily. Need office visit before next refill, last seen 2019. This will be the LAST Rx if visit is not made., Disp: 90 tablet, Rfl: 0  (DISCONTINUED) escitalopram oxalate (LEXAPRO) 10 MG tablet, Take 1 tablet (10 mg total) by mouth once daily., Disp: 90 tablet, Rfl: 3  (DISCONTINUED) metoprolol succinate (TOPROL-XL) 50 MG 24 hr  tablet, Take 1 tablet (50 mg total) by mouth every evening., Disp: 90 tablet, Rfl: 0  (DISCONTINUED) rosuvastatin (CRESTOR) 20 MG tablet, Take 1 tablet by mouth once daily, Disp: 90 tablet, Rfl: 0  (DISCONTINUED) traZODone (DESYREL) 50 MG tablet, Take 1 tablet by mouth in the evening, Disp: 30 tablet, Rfl: 0  (DISCONTINUED) varenicline (CHANTIX STARTING MONTH BOX) 0.5 mg (11)- 1 mg (42) tablet, Take one 0.5mg tab by mouth once daily X3 days,then increase to one 0.5mg tab twice daily X4 days,then increase to one 1mg tab twice daily (Patient not taking: Reported on 6/15/2022), Disp: 1 Package, Rfl: 0    No current facility-administered medications on file prior to visit.        Review of Systems   Constitutional: Negative for activity change, chills, fatigue and fever.   HENT: Positive for postnasal drip and rhinorrhea. Negative for congestion, ear pain and sore throat.    Eyes: Negative for visual disturbance.   Respiratory: Positive for shortness of breath. Negative for cough and chest tightness.    Cardiovascular: Negative for chest pain and palpitations.   Gastrointestinal: Negative for abdominal pain, blood in stool, constipation, diarrhea, nausea and vomiting.   Genitourinary: Negative for difficulty urinating, dysuria and hematuria.        Erectile dysfunction   Musculoskeletal: Negative for arthralgias and neck pain.   Skin: Negative for rash.   Neurological: Negative for dizziness and headaches.   Psychiatric/Behavioral: Negative for sleep disturbance.       Objective:      Physical Exam  Vitals and nursing note reviewed.   Constitutional:       General: He is not in acute distress.     Appearance: Normal appearance. He is well-developed and normal weight. He is not ill-appearing, toxic-appearing or diaphoretic.      Comments: Good blood pressure control  Normal weight for age with a BMI of 26.3 he is down 16 lb from his last visit with me March 30, 2021   HENT:      Head: Normocephalic and atraumatic.       Right Ear: Tympanic membrane, ear canal and external ear normal. There is no impacted cerumen.      Left Ear: Tympanic membrane, ear canal and external ear normal. There is no impacted cerumen.      Nose: Nose normal. No congestion or rhinorrhea.      Mouth/Throat:      Mouth: Mucous membranes are moist.      Pharynx: Oropharynx is clear. No oropharyngeal exudate or posterior oropharyngeal erythema.   Eyes:      General: No scleral icterus.     Extraocular Movements: Extraocular movements intact.      Conjunctiva/sclera: Conjunctivae normal.      Pupils: Pupils are equal, round, and reactive to light.   Neck:      Thyroid: No thyromegaly.      Vascular: No carotid bruit or JVD.      Trachea: No tracheal deviation.   Cardiovascular:      Rate and Rhythm: Normal rate and regular rhythm.      Heart sounds: Normal heart sounds. No murmur heard.    No friction rub. No gallop.   Pulmonary:      Effort: Pulmonary effort is normal. Tachypnea present. No accessory muscle usage, prolonged expiration, respiratory distress or retractions.      Breath sounds: No stridor. Examination of the right-middle field reveals decreased breath sounds. Examination of the left-middle field reveals decreased breath sounds. Examination of the right-lower field reveals decreased breath sounds. Examination of the left-lower field reveals decreased breath sounds. Decreased breath sounds present. No wheezing, rhonchi or rales.   Chest:      Chest wall: No tenderness.   Abdominal:      General: Abdomen is flat. Bowel sounds are normal. There is no distension.      Palpations: Abdomen is soft. There is no mass.      Tenderness: There is no abdominal tenderness. There is no guarding or rebound.      Hernia: No hernia is present.   Musculoskeletal:         General: No swelling, tenderness, deformity or signs of injury. Normal range of motion.      Cervical back: Normal range of motion and neck supple. No rigidity or tenderness.      Right lower leg:  No edema.      Left lower leg: No edema.   Lymphadenopathy:      Cervical: No cervical adenopathy.   Skin:     General: Skin is warm and dry.      Coloration: Skin is not jaundiced or pale.      Findings: No bruising, erythema, lesion or rash.   Neurological:      General: No focal deficit present.      Mental Status: He is alert and oriented to person, place, and time. Mental status is at baseline.      Cranial Nerves: No cranial nerve deficit.      Sensory: No sensory deficit.      Motor: No weakness.      Coordination: Coordination normal.      Gait: Gait normal.      Deep Tendon Reflexes: Reflexes are normal and symmetric. Reflexes normal.   Psychiatric:         Mood and Affect: Mood normal.         Behavior: Behavior normal.         Thought Content: Thought content normal.         Judgment: Judgment normal.         Assessment:       1. Encounter for preventive health examination    2. Reactive depression    3. Coronary artery disease, unspecified vessel or lesion type, unspecified whether angina present, unspecified whether native or transplanted heart    4. S/P CABG x 3, 2007    5. Primary hypertension    6. Hyperlipidemia, unspecified hyperlipidemia type    7. Encounter for colonoscopy due to history of adenomatous colonic polyps    8. Colon cancer screening    9. Tobacco dependence    10. Prostate cancer screening    11. Personal history of nicotine dependence     12. Erectile dysfunction, unspecified erectile dysfunction type    13. PAD (peripheral artery disease)    14. Essential hypertension    15. Coronary artery disease involving native coronary artery without angina pectoris, unspecified whether native or transplanted heart    16. Insomnia, unspecified type    17. Pneumococcal vaccination indicated    18. BMI 26.0-26.9,adult        Plan:       1. Encounter for preventive health examination  - Comprehensive Metabolic Panel; Future  - Lipid Panel; Future  - PSA, Screening; Future  - CBC Auto  Differential; Future    2. Reactive depression  Stable  - EScitalopram oxalate (LEXAPRO) 10 MG tablet; Take 1 tablet (10 mg total) by mouth once daily.  Dispense: 90 tablet; Refill: 3    3. Coronary artery disease, unspecified vessel or lesion type, unspecified whether angina present, unspecified whether native or transplanted heart  Asymptomatic    4. S/P CABG x 3, 2007  Stable  - rosuvastatin (CRESTOR) 20 MG tablet; Take 1 tablet (20 mg total) by mouth once daily.  Dispense: 90 tablet; Refill: 3    5. Primary hypertension  Controlled, should the patient lose any further weight we may need to reduce the Toprol  - Comprehensive Metabolic Panel; Future  - Lipid Panel; Future  - CBC Auto Differential; Future    6. Hyperlipidemia, unspecified hyperlipidemia type  Awaiting lipid panel for possible dose adjustment of Crestor 20 mg  - Comprehensive Metabolic Panel; Future  - Lipid Panel; Future  - rosuvastatin (CRESTOR) 20 MG tablet; Take 1 tablet (20 mg total) by mouth once daily.  Dispense: 90 tablet; Refill: 3    7. Encounter for colonoscopy due to history of adenomatous colonic polyps  Case request for colonoscopy ordered  - Case Request Endoscopy: COLONOSCOPY    8. Colon cancer screening  See above  - Case Request Endoscopy: COLONOSCOPY    9. Tobacco dependence  Due for low-dose chest CT screen, ordered  - CT Chest Lung Screening Low Dose; Future    10. Prostate cancer screening  Awaiting PSA result  - PSA, Screening; Future    11. Personal history of nicotine dependence   See above  - CT Chest Lung Screening Low Dose; Future  - albuterol (PROVENTIL HFA) 90 mcg/actuation inhaler; Inhale 2 puffs into the lungs every 4 (four) hours as needed for Wheezing. Every 4-6 hours PRN  Dispense: 18 g; Refill: 6  - budesonide-formoterol 160-4.5 mcg (SYMBICORT) 160-4.5 mcg/actuation HFAA; Inhale 2 puffs into the lungs every 12 (twelve) hours. Twice a day  Dispense: 10.2 g; Refill: 5    12. Erectile dysfunction, unspecified  erectile dysfunction type  - sildenafil (REVATIO) 20 mg Tab; Take 1 tablet (20 mg total) by mouth 3 (three) times daily.  Dispense: 30 tablet; Refill: 11    13. PAD (peripheral artery disease)  - clopidogreL (PLAVIX) 75 mg tablet; Take 1 tablet (75 mg total) by mouth once daily. Need office visit before next refill, last seen 1/2019. This will be the LAST Rx if visit is not made.  Dispense: 90 tablet; Refill: 3  - rosuvastatin (CRESTOR) 20 MG tablet; Take 1 tablet (20 mg total) by mouth once daily.  Dispense: 90 tablet; Refill: 3    14. Essential hypertension  Good control no changes needed at this time but may require reduction in Toprol if he continues to lose weight  - albuterol (PROVENTIL HFA) 90 mcg/actuation inhaler; Inhale 2 puffs into the lungs every 4 (four) hours as needed for Wheezing. Every 4-6 hours PRN  Dispense: 18 g; Refill: 6  - budesonide-formoterol 160-4.5 mcg (SYMBICORT) 160-4.5 mcg/actuation HFAA; Inhale 2 puffs into the lungs every 12 (twelve) hours. Twice a day  Dispense: 10.2 g; Refill: 5  - clopidogreL (PLAVIX) 75 mg tablet; Take 1 tablet (75 mg total) by mouth once daily. Need office visit before next refill, last seen 1/2019. This will be the LAST Rx if visit is not made.  Dispense: 90 tablet; Refill: 3  - metoprolol succinate (TOPROL-XL) 50 MG 24 hr tablet; Take 1 tablet (50 mg total) by mouth every evening.  Dispense: 90 tablet; Refill: 3    15. Coronary artery disease involving native coronary artery without angina pectoris, unspecified whether native or transplanted heart  Stable  - clopidogreL (PLAVIX) 75 mg tablet; Take 1 tablet (75 mg total) by mouth once daily. Need office visit before next refill, last seen 1/2019. This will be the LAST Rx if visit is not made.  Dispense: 90 tablet; Refill: 3  - rosuvastatin (CRESTOR) 20 MG tablet; Take 1 tablet (20 mg total) by mouth once daily.  Dispense: 90 tablet; Refill: 3    16. Insomnia, unspecified type  Stable  - traZODone (DESYREL) 50 MG  tablet; Take 1 tablet (50 mg total) by mouth every evening.  Dispense: 90 tablet; Refill: 3    17. Pneumococcal vaccination indicated  Given  - (In Office Administered) Pneumococcal Conjugate Vaccine (20 Valent) (IM)    18. BMI 26.0-26.9,adult

## 2022-06-16 ENCOUNTER — DOCUMENTATION ONLY (OUTPATIENT)
Dept: FAMILY MEDICINE | Facility: CLINIC | Age: 67
End: 2022-06-16
Payer: MEDICARE

## 2022-06-16 ENCOUNTER — LAB VISIT (OUTPATIENT)
Dept: LAB | Facility: HOSPITAL | Age: 67
End: 2022-06-16
Attending: FAMILY MEDICINE
Payer: MEDICARE

## 2022-06-16 DIAGNOSIS — Z12.5 PROSTATE CANCER SCREENING: ICD-10-CM

## 2022-06-16 DIAGNOSIS — I10 PRIMARY HYPERTENSION: ICD-10-CM

## 2022-06-16 DIAGNOSIS — E78.5 HYPERLIPIDEMIA, UNSPECIFIED HYPERLIPIDEMIA TYPE: ICD-10-CM

## 2022-06-16 DIAGNOSIS — Z00.00 ENCOUNTER FOR PREVENTIVE HEALTH EXAMINATION: ICD-10-CM

## 2022-06-16 LAB
ALBUMIN SERPL BCP-MCNC: 3.1 G/DL (ref 3.5–5.2)
ALP SERPL-CCNC: 92 U/L (ref 55–135)
ALT SERPL W/O P-5'-P-CCNC: <5 U/L (ref 10–44)
ANION GAP SERPL CALC-SCNC: 9 MMOL/L (ref 8–16)
AST SERPL-CCNC: 12 U/L (ref 10–40)
BASOPHILS # BLD AUTO: 0.03 K/UL (ref 0–0.2)
BASOPHILS NFR BLD: 0.3 % (ref 0–1.9)
BILIRUB SERPL-MCNC: 0.6 MG/DL (ref 0.1–1)
BUN SERPL-MCNC: 12 MG/DL (ref 8–23)
CALCIUM SERPL-MCNC: 9.2 MG/DL (ref 8.7–10.5)
CHLORIDE SERPL-SCNC: 107 MMOL/L (ref 95–110)
CHOLEST SERPL-MCNC: 110 MG/DL (ref 120–199)
CHOLEST/HDLC SERPL: 3.3 {RATIO} (ref 2–5)
CO2 SERPL-SCNC: 24 MMOL/L (ref 23–29)
COMPLEXED PSA SERPL-MCNC: 1.3 NG/ML (ref 0–4)
CREAT SERPL-MCNC: 0.7 MG/DL (ref 0.5–1.4)
DIFFERENTIAL METHOD: ABNORMAL
EOSINOPHIL # BLD AUTO: 0.1 K/UL (ref 0–0.5)
EOSINOPHIL NFR BLD: 1.1 % (ref 0–8)
ERYTHROCYTE [DISTWIDTH] IN BLOOD BY AUTOMATED COUNT: 13.7 % (ref 11.5–14.5)
EST. GFR  (AFRICAN AMERICAN): >60 ML/MIN/1.73 M^2
EST. GFR  (NON AFRICAN AMERICAN): >60 ML/MIN/1.73 M^2
GLUCOSE SERPL-MCNC: 96 MG/DL (ref 70–110)
HCT VFR BLD AUTO: 46.6 % (ref 40–54)
HDLC SERPL-MCNC: 33 MG/DL (ref 40–75)
HDLC SERPL: 30 % (ref 20–50)
HGB BLD-MCNC: 14.6 G/DL (ref 14–18)
IMM GRANULOCYTES # BLD AUTO: 0.03 K/UL (ref 0–0.04)
IMM GRANULOCYTES NFR BLD AUTO: 0.3 % (ref 0–0.5)
LDLC SERPL CALC-MCNC: 55.4 MG/DL (ref 63–159)
LYMPHOCYTES # BLD AUTO: 1.2 K/UL (ref 1–4.8)
LYMPHOCYTES NFR BLD: 11.1 % (ref 18–48)
MCH RBC QN AUTO: 32.1 PG (ref 27–31)
MCHC RBC AUTO-ENTMCNC: 31.3 G/DL (ref 32–36)
MCV RBC AUTO: 102 FL (ref 82–98)
MONOCYTES # BLD AUTO: 0.7 K/UL (ref 0.3–1)
MONOCYTES NFR BLD: 7.2 % (ref 4–15)
NEUTROPHILS # BLD AUTO: 8.3 K/UL (ref 1.8–7.7)
NEUTROPHILS NFR BLD: 80 % (ref 38–73)
NONHDLC SERPL-MCNC: 77 MG/DL
NRBC BLD-RTO: 0 /100 WBC
PLATELET # BLD AUTO: 205 K/UL (ref 150–450)
PMV BLD AUTO: 10.7 FL (ref 9.2–12.9)
POTASSIUM SERPL-SCNC: 3.9 MMOL/L (ref 3.5–5.1)
PROT SERPL-MCNC: 7.7 G/DL (ref 6–8.4)
RBC # BLD AUTO: 4.55 M/UL (ref 4.6–6.2)
SODIUM SERPL-SCNC: 140 MMOL/L (ref 136–145)
TRIGL SERPL-MCNC: 108 MG/DL (ref 30–150)
WBC # BLD AUTO: 10.32 K/UL (ref 3.9–12.7)

## 2022-06-16 PROCEDURE — 80053 COMPREHEN METABOLIC PANEL: CPT | Performed by: FAMILY MEDICINE

## 2022-06-16 PROCEDURE — 85025 COMPLETE CBC W/AUTO DIFF WBC: CPT | Performed by: FAMILY MEDICINE

## 2022-06-16 PROCEDURE — 84153 ASSAY OF PSA TOTAL: CPT | Performed by: FAMILY MEDICINE

## 2022-06-16 PROCEDURE — 80061 LIPID PANEL: CPT | Performed by: FAMILY MEDICINE

## 2022-06-16 PROCEDURE — 36415 COLL VENOUS BLD VENIPUNCTURE: CPT | Mod: PO | Performed by: FAMILY MEDICINE

## 2022-06-16 NOTE — PROGRESS NOTES
Prior authorization for Sildenafil 20 mg was denied.  Pharmacy notified.  Message left for patient.

## 2022-06-20 ENCOUNTER — HOSPITAL ENCOUNTER (OUTPATIENT)
Dept: RADIOLOGY | Facility: HOSPITAL | Age: 67
Discharge: HOME | End: 2022-06-20
Attending: FAMILY MEDICINE
Payer: MEDICARE

## 2022-06-20 DIAGNOSIS — R91.8 MULTIPLE LUNG NODULES ON CT: Primary | ICD-10-CM

## 2022-06-20 DIAGNOSIS — Z87.891 PERSONAL HISTORY OF NICOTINE DEPENDENCE: ICD-10-CM

## 2022-06-20 DIAGNOSIS — F17.200 TOBACCO DEPENDENCE: ICD-10-CM

## 2022-06-20 PROCEDURE — 71271 CT THORAX LUNG CANCER SCR C-: CPT | Mod: 26,,, | Performed by: RADIOLOGY

## 2022-06-20 PROCEDURE — 71271 CT THORAX LUNG CANCER SCR C-: CPT | Mod: TC

## 2022-06-20 PROCEDURE — 71271 CT CHEST LUNG SCREENING LOW DOSE: ICD-10-PCS | Mod: 26,,, | Performed by: RADIOLOGY

## 2022-06-21 ENCOUNTER — TELEPHONE (OUTPATIENT)
Dept: FAMILY MEDICINE | Facility: CLINIC | Age: 67
End: 2022-06-21
Payer: MEDICARE

## 2022-06-21 NOTE — TELEPHONE ENCOUNTER
Spoke to patient - abnormal ctscan of lungs- appt made with Nicole Page in pulmonology for 6/22 at 11:00. Hospital will contact him to schedule pet.

## 2022-06-21 NOTE — TELEPHONE ENCOUNTER
----- Message from Cande Glass sent at 6/21/2022 12:33 PM CDT -----  Contact: pt [phone]  Patient stated that they missed a call from this office.    Please call and advise.    Thank You

## 2022-06-22 ENCOUNTER — OFFICE VISIT (OUTPATIENT)
Dept: PULMONOLOGY | Facility: CLINIC | Age: 67
End: 2022-06-22
Payer: MEDICARE

## 2022-06-22 VITALS
WEIGHT: 170.63 LBS | HEART RATE: 57 BPM | SYSTOLIC BLOOD PRESSURE: 115 MMHG | HEIGHT: 68 IN | DIASTOLIC BLOOD PRESSURE: 68 MMHG | OXYGEN SATURATION: 94 % | BODY MASS INDEX: 25.86 KG/M2

## 2022-06-22 DIAGNOSIS — J43.2 CENTRILOBULAR EMPHYSEMA: ICD-10-CM

## 2022-06-22 DIAGNOSIS — R06.02 SHORTNESS OF BREATH: ICD-10-CM

## 2022-06-22 DIAGNOSIS — R91.8 MULTIPLE LUNG NODULES ON CT: Primary | ICD-10-CM

## 2022-06-22 PROCEDURE — 3008F PR BODY MASS INDEX (BMI) DOCUMENTED: ICD-10-PCS | Mod: CPTII,S$GLB,, | Performed by: NURSE PRACTITIONER

## 2022-06-22 PROCEDURE — 99204 PR OFFICE/OUTPT VISIT, NEW, LEVL IV, 45-59 MIN: ICD-10-PCS | Mod: S$GLB,,, | Performed by: NURSE PRACTITIONER

## 2022-06-22 PROCEDURE — 99499 UNLISTED E&M SERVICE: CPT | Mod: S$GLB,,, | Performed by: NURSE PRACTITIONER

## 2022-06-22 PROCEDURE — 3074F PR MOST RECENT SYSTOLIC BLOOD PRESSURE < 130 MM HG: ICD-10-PCS | Mod: CPTII,S$GLB,, | Performed by: NURSE PRACTITIONER

## 2022-06-22 PROCEDURE — 1101F PR PT FALLS ASSESS DOC 0-1 FALLS W/OUT INJ PAST YR: ICD-10-PCS | Mod: CPTII,S$GLB,, | Performed by: NURSE PRACTITIONER

## 2022-06-22 PROCEDURE — 99204 OFFICE O/P NEW MOD 45 MIN: CPT | Mod: S$GLB,,, | Performed by: NURSE PRACTITIONER

## 2022-06-22 PROCEDURE — 3078F PR MOST RECENT DIASTOLIC BLOOD PRESSURE < 80 MM HG: ICD-10-PCS | Mod: CPTII,S$GLB,, | Performed by: NURSE PRACTITIONER

## 2022-06-22 PROCEDURE — 3078F DIAST BP <80 MM HG: CPT | Mod: CPTII,S$GLB,, | Performed by: NURSE PRACTITIONER

## 2022-06-22 PROCEDURE — 1159F MED LIST DOCD IN RCRD: CPT | Mod: CPTII,S$GLB,, | Performed by: NURSE PRACTITIONER

## 2022-06-22 PROCEDURE — 1101F PT FALLS ASSESS-DOCD LE1/YR: CPT | Mod: CPTII,S$GLB,, | Performed by: NURSE PRACTITIONER

## 2022-06-22 PROCEDURE — 3288F PR FALLS RISK ASSESSMENT DOCUMENTED: ICD-10-PCS | Mod: CPTII,S$GLB,, | Performed by: NURSE PRACTITIONER

## 2022-06-22 PROCEDURE — 99999 PR PBB SHADOW E&M-EST. PATIENT-LVL III: ICD-10-PCS | Mod: PBBFAC,,, | Performed by: NURSE PRACTITIONER

## 2022-06-22 PROCEDURE — 3288F FALL RISK ASSESSMENT DOCD: CPT | Mod: CPTII,S$GLB,, | Performed by: NURSE PRACTITIONER

## 2022-06-22 PROCEDURE — 3074F SYST BP LT 130 MM HG: CPT | Mod: CPTII,S$GLB,, | Performed by: NURSE PRACTITIONER

## 2022-06-22 PROCEDURE — 99999 PR PBB SHADOW E&M-EST. PATIENT-LVL III: CPT | Mod: PBBFAC,,, | Performed by: NURSE PRACTITIONER

## 2022-06-22 PROCEDURE — 1159F PR MEDICATION LIST DOCUMENTED IN MEDICAL RECORD: ICD-10-PCS | Mod: CPTII,S$GLB,, | Performed by: NURSE PRACTITIONER

## 2022-06-22 PROCEDURE — 3008F BODY MASS INDEX DOCD: CPT | Mod: CPTII,S$GLB,, | Performed by: NURSE PRACTITIONER

## 2022-06-22 PROCEDURE — 1126F AMNT PAIN NOTED NONE PRSNT: CPT | Mod: CPTII,S$GLB,, | Performed by: NURSE PRACTITIONER

## 2022-06-22 PROCEDURE — 1126F PR PAIN SEVERITY QUANTIFIED, NO PAIN PRESENT: ICD-10-PCS | Mod: CPTII,S$GLB,, | Performed by: NURSE PRACTITIONER

## 2022-06-22 PROCEDURE — 99499 RISK ADDL DX/OHS AUDIT: ICD-10-PCS | Mod: S$GLB,,, | Performed by: NURSE PRACTITIONER

## 2022-06-22 NOTE — PATIENT INSTRUCTIONS
Scheduling lung strength test, this must be done    Will wait on PET scan results. Expect to have tissue biopsy at hospital.

## 2022-06-22 NOTE — PROGRESS NOTES
2022    Srinivasa Oliver  New Patient Consult    Chief Complaint   Patient presents with    Lung Cancer    Shortness of Breath    Wheezing    Cough     white       HPI: 2022- screening Ct by PCP showed diffuse emphysema and multiple nodules. Scheduled for PET scan.   Compliant of SOB onset years, worse with exertion, improves with rest. On symbicort and albuterol rescue with benefit.   Cough- onset 6 months, worse in mornings, productive clear mucous. Associated with wheeze. Has nocturnal coughing fits.     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, Smoking Hx; currently smoking 1 pack daily, 43 pack years  Family Hx: no Lung Cancer, no COPD, no Asthma  Medical Hx: no previous pneumonia ; no previous shoulder surgery, CABG surgery           The chief compliant  problem is new to me  PFSH:  Past Medical History:   Diagnosis Date    Adenomatous colon polyp 2011    Adenomatous colon polyp     Anxiety     Blood transfusion     CHF (congestive heart failure)     Emphysema of lung     Hypertension     S/P coronary artery stent placement 2013    Staph skin infection     abd/ lanced x 2    Ulcer          Past Surgical History:   Procedure Laterality Date    COLONOSCOPY  2011    Dr Choudhary, tubular adenoma    CORONARY ARTERY BYPASS GRAFT      CORONARY STENT PLACEMENT       Social History     Tobacco Use    Smoking status: Former Smoker     Packs/day: 0.75     Years: 42.00     Pack years: 31.50     Types: Cigarettes     Quit date: 2018     Years since quittin.0    Smokeless tobacco: Never Used    Tobacco comment: patient room 7    Substance Use Topics    Alcohol use: No     Alcohol/week: 0.0 standard drinks    Drug use: No     Family History   Problem Relation Age of Onset    Heart disease Mother     Heart disease Father     Diabetes Father     Heart disease Maternal Aunt     No Known Problems Sister     No  "Known Problems Brother     No Known Problems Daughter     No Known Problems Son      Review of patient's allergies indicates:   Allergen Reactions    Atorvastatin      Other reaction(s): Rash     I have reviewed past medical, family, and social history. I have reviewed previous nurse notes.    Performance Status:The patient's activity level is functions out of house.          Review of Systems   Constitutional: Negative for activity change, appetite change, chills, diaphoresis, fatigue, fever. Positive for weight loss over a year 60 lbs  HENT: Negative for dental problem, postnasal drip, rhinorrhea, sinus pressure, sinus pain, sneezing, sore throat, trouble swallowing and voice change.    Respiratory: Negative for apnea,  and stridor.  Positive for cough, shortness of breath, wheezing  Cardiovascular: Negative for chest pain, palpitations and leg swelling.   Gastrointestinal: Negative for abdominal distention, abdominal pain, constipation and nausea.   Musculoskeletal: Negative for gait problem, myalgias and neck pain.   Skin: Negative for color change and pallor.   Allergic/Immunologic: Negative for environmental allergies and food allergies.   Neurological: Negative for dizziness, speech difficulty, weakness, light-headedness, numbness and headaches.   Hematological: Negative for adenopathy. Does not bruise/bleed easily.   Psychiatric/Behavioral: Negative for dysphoric mood and sleep disturbance. The patient is not nervous/anxious.           Exam:Comprehensive exam done. /68 (BP Location: Right arm, Patient Position: Sitting, BP Method: Medium (Automatic))   Pulse (!) 57   Ht 5' 7.5" (1.715 m)   Wt 77.4 kg (170 lb 10.2 oz)   SpO2 (!) 94% Comment: on room air at rest  BMI 26.33 kg/m²   Exam included Vitals as listed  Constitutional: He is oriented to person, place, and time. He appears well-developed. No distress.   Nose: Nose normal.   Mouth/Throat: Uvula is midline, oropharynx is clear and moist and " mucous membranes are normal. No teeth No oropharyngeal exudate, posterior oropharyngeal edema, posterior oropharyngeal erythema or tonsillar abscesses.  M[place] (M) score  2  Eyes: Pupils are equal, round, and reactive to light.   Neck: No JVD present. No thyromegaly present.   Cardiovascular: Normal rate, regular rhythm and normal heart sounds. Exam reveals no gallop and no friction rub.   No murmur heard.  Pulmonary/Chest: Effort normal and breath sounds bilateral crackles. No accessory muscle usage or stridor. No apnea and no tachypnea. No respiratory distress,   Abdominal: Soft. He exhibits no mass. There is no tenderness. No hepatosplenomegaly, hernias and normoactive bowel sounds  Musculoskeletal: Normal range of motion. exhibits no edema.   Lymphadenopathy:     He has no cervical adenopathy.     He has no axillary adenopathy.   Neurological:  alert and oriented to person, place, and time. not disoriented.   Skin: Skin is warm and dry. Capillary refill takes less 2 sec. No cyanosis or erythema. No pallor. Nails show bilateral clubbing.   Psychiatric: normal mood and affect. behavior is normal. Judgment and thought content normal.       Radiographs (ct chest and cxr) reviewed: view by direct vision   CT Chest Lung Screening Low Dose  06/20/2022   There are abnormal opacities that require further evaluation. The largest opacity in the right lung appears solid and measures 6 mm on series 4, image 282. The largest opacity in the left lung appears solid and measures 2.8 cm on series 4, image 138. This is suspicious for neoplasm and further evaluation by PET CT and or percutaneous biopsy is recommended. The patient has pre-existing severe emphysema and severe interstitial fibrosis with multiple areas of honeycombing.     Transthoracic echo (TTE) complete 1/15/19   The estimated ejection fraction is 60%. Grade 1 diastolic dysfunction    Mild elevated PA systolic pressure, 43 mm Hg by calculation.       Labs  reviewed       Lab Results   Component Value Date    WBC 10.32 06/16/2022    RBC 4.55 (L) 06/16/2022    HGB 14.6 06/16/2022    HCT 46.6 06/16/2022     (H) 06/16/2022    MCH 32.1 (H) 06/16/2022    MCHC 31.3 (L) 06/16/2022    RDW 13.7 06/16/2022     06/16/2022    MPV 10.7 06/16/2022    GRAN 8.3 (H) 06/16/2022    GRAN 80.0 (H) 06/16/2022    LYMPH 1.2 06/16/2022    LYMPH 11.1 (L) 06/16/2022    MONO 0.7 06/16/2022    MONO 7.2 06/16/2022    EOS 0.1 06/16/2022    BASO 0.03 06/16/2022    EOSINOPHIL 1.1 06/16/2022    BASOPHIL 0.3 06/16/2022       PFT will be done and results to be reviewed  Pulmonary Functions Testing Results:        Plan:  Clinical impression is apparently straight forward and impression with management as below.    Srinivasa was seen today for lung cancer, shortness of breath, wheezing and cough.    Diagnoses and all orders for this visit:    Multiple lung nodules on CT  -     Culture, Respiratory with Gram Stain; Future  -     Cancel: NM PET CT Routine Skull to Mid Thigh; Future    Shortness of breath  -     Complete PFT with bronchodilator; Future  -     Six Minute Walk Test to qualify for Home Oxygen; Future    Centrilobular emphysema  -     Six Minute Walk Test to qualify for Home Oxygen; Future        Follow up in about 4 weeks (around 7/20/2022), or if symptoms worsen or fail to improve.    Discussed with patient above for education the following:      Patient Instructions   Scheduling lung strength test, this must be done    Will wait on PET scan results. Expect to have tissue biopsy at hospital.

## 2022-06-24 ENCOUNTER — TELEPHONE (OUTPATIENT)
Dept: PULMONOLOGY | Facility: CLINIC | Age: 67
End: 2022-06-24
Payer: MEDICARE

## 2022-06-24 ENCOUNTER — HOSPITAL ENCOUNTER (OUTPATIENT)
Dept: PULMONOLOGY | Facility: HOSPITAL | Age: 67
Discharge: HOME | End: 2022-06-24
Attending: NURSE PRACTITIONER
Payer: MEDICARE

## 2022-06-24 DIAGNOSIS — J43.2 CENTRILOBULAR EMPHYSEMA: ICD-10-CM

## 2022-06-24 DIAGNOSIS — R06.02 SHORTNESS OF BREATH: ICD-10-CM

## 2022-06-24 DIAGNOSIS — J84.10 PULMONARY FIBROSIS: Primary | ICD-10-CM

## 2022-06-24 LAB
BR6MWT: NORMAL
BRPFT: NORMAL
DLCO ADJ PRE: 7.91 ML/(MIN*MMHG)
DLCO SINGLE BREATH LLN: 21.38
DLCO SINGLE BREATH PRE REF: 27.9 %
DLCO SINGLE BREATH REF: 28.31
DLCOC SBVA LLN: 2.75
DLCOC SBVA PRE REF: 49.5 %
DLCOC SBVA REF: 3.88
DLCOC SINGLE BREATH LLN: 21.38
DLCOC SINGLE BREATH PRE REF: 27.9 %
DLCOC SINGLE BREATH REF: 28.31
DLCOVA LLN: 2.75
DLCOVA PRE REF: 49.5 %
DLCOVA PRE: 1.92 ML/(MIN*MMHG*L)
DLCOVA REF: 3.88
DLVAADJ PRE: 1.92 ML/(MIN*MMHG*L)
ERVN2 LLN: -16448.89
ERVN2 PRE REF: 146.8 %
ERVN2 PRE: 1.63 L
ERVN2 REF: 1.11
FEF 25 75 CHG: -3.1 %
FEF 25 75 LLN: 1.56
FEF 25 75 POST REF: 73 %
FEF 25 75 PRE REF: 75.3 %
FEF 25 75 REF: 3.27
FET100 CHG: 43.9 %
FEV1 CHG: 1 %
FEV1 FVC CHG: -0.5 %
FEV1 LLN: 2.44
FEV1 POST REF: 75.7 %
FEV1 PRE REF: 75 %
FEV1 REF: 3.28
FRCN2 LLN: 2.75
FRCN2 PRE REF: 66.8 %
FRCN2 REF: 3.73
FVC CHG: 1.5 %
FVC LLN: 3.26
FVC POST REF: 73.1 %
FVC PRE REF: 72.1 %
FVC REF: 4.26
IVC PRE: 2.7 L
IVC SINGLE BREATH LLN: 3.26
IVC SINGLE BREATH PRE REF: 63.4 %
IVC SINGLE BREATH REF: 4.26
MVV LLN: 111
MVV PRE REF: 72.5 %
MVV REF: 131
PEF CHG: -6.7 %
PEF LLN: 6.29
PEF POST REF: 74.7 %
PEF PRE REF: 80 %
PEF REF: 8.28
POST FEF 25 75: 2.38 L/S
POST FET 100: 6.82 SEC
POST FEV1 FVC: 79.65 %
POST FEV1: 2.48 L
POST FVC: 3.12 L
POST PEF: 6.18 L/S
PRE DLCO: 7.91 ML/(MIN*MMHG)
PRE FEF 25 75: 2.46 L/S
PRE FET 100: 4.74 SEC
PRE FEV1 FVC: 80.03 %
PRE FEV1: 2.46 L
PRE FRC N2: 2.5 L
PRE FVC: 3.07 L
PRE MVV: 95 L/MIN
PRE PEF: 6.63 L/S
RVN2 LLN: 1.95
RVN2 PRE REF: 35.1 %
RVN2 PRE: 0.92 L
RVN2 REF: 2.62
RVN2TLCN2 LLN: 31.5
RVN2TLCN2 PRE REF: 57 %
RVN2TLCN2 PRE: 23.08 %
RVN2TLCN2 REF: 40.48
TLCN2 LLN: 6.15
TLCN2 PRE REF: 54.7 %
TLCN2 PRE: 3.99 L
TLCN2 REF: 7.3
VA PRE: 4.13 L
VA SINGLE BREATH LLN: 7.15
VA SINGLE BREATH PRE REF: 57.7 %
VA SINGLE BREATH REF: 7.15
VCMAXN2 LLN: 3.26
VCMAXN2 PRE REF: 72.1 %
VCMAXN2 PRE: 3.07 L
VCMAXN2 REF: 4.26

## 2022-06-24 PROCEDURE — 94727 GAS DIL/WSHOT DETER LNG VOL: CPT

## 2022-06-24 PROCEDURE — 94618 PULMONARY STRESS TESTING: CPT

## 2022-06-24 PROCEDURE — 94727 GAS DIL/WSHOT DETER LNG VOL: CPT | Mod: 26,,, | Performed by: INTERNAL MEDICINE

## 2022-06-24 PROCEDURE — 94618 PULMONARY STRESS TESTING: CPT | Mod: 26,,, | Performed by: INTERNAL MEDICINE

## 2022-06-24 PROCEDURE — 94729 PR C02/MEMBANE DIFFUSE CAPACITY: ICD-10-PCS | Mod: 26,,, | Performed by: INTERNAL MEDICINE

## 2022-06-24 PROCEDURE — 94729 DIFFUSING CAPACITY: CPT

## 2022-06-24 PROCEDURE — 94060 PR EVAL OF BRONCHOSPASM: ICD-10-PCS | Mod: 26,59,, | Performed by: INTERNAL MEDICINE

## 2022-06-24 PROCEDURE — 94060 EVALUATION OF WHEEZING: CPT

## 2022-06-24 PROCEDURE — 94727 PR PULM FUNCTION TEST BY GAS: ICD-10-PCS | Mod: 26,,, | Performed by: INTERNAL MEDICINE

## 2022-06-24 PROCEDURE — 94060 EVALUATION OF WHEEZING: CPT | Mod: 26,59,, | Performed by: INTERNAL MEDICINE

## 2022-06-24 PROCEDURE — 94729 DIFFUSING CAPACITY: CPT | Mod: 26,,, | Performed by: INTERNAL MEDICINE

## 2022-06-24 PROCEDURE — 94618 PULMONARY STRESS TESTING: ICD-10-PCS | Mod: 26,,, | Performed by: INTERNAL MEDICINE

## 2022-06-27 ENCOUNTER — TELEPHONE (OUTPATIENT)
Dept: PULMONOLOGY | Facility: CLINIC | Age: 67
End: 2022-06-27
Payer: MEDICARE

## 2022-06-27 DIAGNOSIS — J84.9 ILD (INTERSTITIAL LUNG DISEASE): Primary | ICD-10-CM

## 2022-06-27 NOTE — TELEPHONE ENCOUNTER
----- Message from Nicole Page NP sent at 6/27/2022  2:04 PM CDT -----  Sputum cultures do not show any abnormal bacteria. Continue current treatment plan. Will review images and results at next appointment.

## 2022-07-07 ENCOUNTER — TELEPHONE (OUTPATIENT)
Dept: FAMILY MEDICINE | Facility: CLINIC | Age: 67
End: 2022-07-07
Payer: MEDICARE

## 2022-07-07 ENCOUNTER — HOSPITAL ENCOUNTER (OUTPATIENT)
Dept: RADIOLOGY | Facility: HOSPITAL | Age: 67
Discharge: HOME | End: 2022-07-07
Attending: FAMILY MEDICINE
Payer: MEDICARE

## 2022-07-07 VITALS — WEIGHT: 170 LBS | BODY MASS INDEX: 23.8 KG/M2 | HEIGHT: 71 IN

## 2022-07-07 DIAGNOSIS — R91.8 MULTIPLE LUNG NODULES ON CT: ICD-10-CM

## 2022-07-07 LAB — GLUCOSE SERPL-MCNC: 113 MG/DL (ref 70–110)

## 2022-07-07 PROCEDURE — 78815 PET IMAGE W/CT SKULL-THIGH: CPT | Mod: TC,PO

## 2022-07-07 PROCEDURE — A9552 F18 FDG: HCPCS | Mod: PO,GZ

## 2022-07-07 NOTE — TELEPHONE ENCOUNTER
----- Message from Nicole Page NP sent at 7/7/2022  1:54 PM CDT -----  I am working with Dr. Flowers on this patient. We are waiting on blood work to return. New test that looks for Tumor antibodies done in external lab.     Will let you know of results. The Pulmonary will proceed with CT monitoring and treat with therapy.     Nicole   ----- Message -----  From: Genaro Womack MD  Sent: 7/7/2022  12:25 PM CDT  To: Nicole Page NP, [facility]DELL Staff    The PET CT shows multiple contiguous hypermetabolic lesions in the left side but the radiologist suggest they look more inflammatory or infectious and recommended a follow-up CT chest in three months following antibiotic therapy.  Biopsy was considered as an alternative but the patient is at high risk for pneumothorax due to underlying emphysema.    To Nicole Page:  Do you want to give him the antibiotics and do the CT or do you want me to do that?  Or do you have another suggestion?

## 2022-07-08 ENCOUNTER — TELEPHONE (OUTPATIENT)
Dept: HEMATOLOGY/ONCOLOGY | Facility: CLINIC | Age: 67
End: 2022-07-08
Payer: MEDICARE

## 2022-07-12 ENCOUNTER — DOCUMENTATION ONLY (OUTPATIENT)
Dept: HEMATOLOGY/ONCOLOGY | Facility: CLINIC | Age: 67
End: 2022-07-12
Payer: MEDICARE

## 2022-07-12 ENCOUNTER — OFFICE VISIT (OUTPATIENT)
Dept: PULMONOLOGY | Facility: CLINIC | Age: 67
End: 2022-07-12
Payer: MEDICARE

## 2022-07-12 VITALS
SYSTOLIC BLOOD PRESSURE: 123 MMHG | DIASTOLIC BLOOD PRESSURE: 69 MMHG | TEMPERATURE: 98 F | OXYGEN SATURATION: 92 % | RESPIRATION RATE: 16 BRPM | HEIGHT: 68 IN | BODY MASS INDEX: 25.29 KG/M2 | WEIGHT: 166.88 LBS | HEART RATE: 66 BPM

## 2022-07-12 DIAGNOSIS — Z87.891 PERSONAL HISTORY OF NICOTINE DEPENDENCE: ICD-10-CM

## 2022-07-12 DIAGNOSIS — Z95.1 S/P CABG X 3: ICD-10-CM

## 2022-07-12 DIAGNOSIS — R94.2 ABNORMAL PET SCAN OF LUNG: ICD-10-CM

## 2022-07-12 DIAGNOSIS — H91.93 BILATERAL HEARING LOSS, UNSPECIFIED HEARING LOSS TYPE: Primary | ICD-10-CM

## 2022-07-12 DIAGNOSIS — I25.10 CORONARY ARTERY DISEASE INVOLVING NATIVE CORONARY ARTERY OF NATIVE HEART WITHOUT ANGINA PECTORIS: ICD-10-CM

## 2022-07-12 DIAGNOSIS — Z87.891 HISTORY OF SMOKING GREATER THAN 50 PACK YEARS: ICD-10-CM

## 2022-07-12 PROCEDURE — 1101F PR PT FALLS ASSESS DOC 0-1 FALLS W/OUT INJ PAST YR: ICD-10-PCS | Mod: CPTII,S$GLB,, | Performed by: INTERNAL MEDICINE

## 2022-07-12 PROCEDURE — 3288F PR FALLS RISK ASSESSMENT DOCUMENTED: ICD-10-PCS | Mod: CPTII,S$GLB,, | Performed by: INTERNAL MEDICINE

## 2022-07-12 PROCEDURE — 3078F DIAST BP <80 MM HG: CPT | Mod: CPTII,S$GLB,, | Performed by: INTERNAL MEDICINE

## 2022-07-12 PROCEDURE — 3288F FALL RISK ASSESSMENT DOCD: CPT | Mod: CPTII,S$GLB,, | Performed by: INTERNAL MEDICINE

## 2022-07-12 PROCEDURE — 3008F BODY MASS INDEX DOCD: CPT | Mod: CPTII,S$GLB,, | Performed by: INTERNAL MEDICINE

## 2022-07-12 PROCEDURE — 99999 PR PBB SHADOW E&M-EST. PATIENT-LVL III: CPT | Mod: PBBFAC,,, | Performed by: INTERNAL MEDICINE

## 2022-07-12 PROCEDURE — 99214 OFFICE O/P EST MOD 30 MIN: CPT | Mod: S$GLB,,, | Performed by: INTERNAL MEDICINE

## 2022-07-12 PROCEDURE — 3074F PR MOST RECENT SYSTOLIC BLOOD PRESSURE < 130 MM HG: ICD-10-PCS | Mod: CPTII,S$GLB,, | Performed by: INTERNAL MEDICINE

## 2022-07-12 PROCEDURE — 99214 PR OFFICE/OUTPT VISIT, EST, LEVL IV, 30-39 MIN: ICD-10-PCS | Mod: S$GLB,,, | Performed by: INTERNAL MEDICINE

## 2022-07-12 PROCEDURE — 3008F PR BODY MASS INDEX (BMI) DOCUMENTED: ICD-10-PCS | Mod: CPTII,S$GLB,, | Performed by: INTERNAL MEDICINE

## 2022-07-12 PROCEDURE — 99999 PR PBB SHADOW E&M-EST. PATIENT-LVL III: ICD-10-PCS | Mod: PBBFAC,,, | Performed by: INTERNAL MEDICINE

## 2022-07-12 PROCEDURE — 1126F PR PAIN SEVERITY QUANTIFIED, NO PAIN PRESENT: ICD-10-PCS | Mod: CPTII,S$GLB,, | Performed by: INTERNAL MEDICINE

## 2022-07-12 PROCEDURE — 3074F SYST BP LT 130 MM HG: CPT | Mod: CPTII,S$GLB,, | Performed by: INTERNAL MEDICINE

## 2022-07-12 PROCEDURE — 1126F AMNT PAIN NOTED NONE PRSNT: CPT | Mod: CPTII,S$GLB,, | Performed by: INTERNAL MEDICINE

## 2022-07-12 PROCEDURE — 3078F PR MOST RECENT DIASTOLIC BLOOD PRESSURE < 80 MM HG: ICD-10-PCS | Mod: CPTII,S$GLB,, | Performed by: INTERNAL MEDICINE

## 2022-07-12 PROCEDURE — 1101F PT FALLS ASSESS-DOCD LE1/YR: CPT | Mod: CPTII,S$GLB,, | Performed by: INTERNAL MEDICINE

## 2022-07-12 NOTE — PROGRESS NOTES
[facility]      THORACIC MULTIDISCIPLINARY TUMOR BOARD  PATIENT REVIEW FORM  ________________________________________________________________________    CLINIC #: [id number]  DATE: 07/12/2022    TUMOR SITE:   Multiple left sided pulm lesions     PRESENTER:   Dr. 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 Few small less avid disease.     ? Needle bx    BOARD RECOMMENDATIONS:   Dr. Sandra will attempt bx with lung Vision     CONSULT NEEDED:     [] Surgery    [] Hem/Onc    [] Rad/Onc    [] Dietary                 [] Social Service    [] Psychology       [] Pulmonology           [x] Selena'l Treatment Guidelines reviewed and care planned is consistent with guidelines.         (i.e., NCCN, NCI, PD, ACO, AUA, etc.)    PRESENTATION AT CANCER CONFERENCE:         [x] Prospective    [] Retrospective     [] Follow-Up          [] Eligible for clinical trial      Tresa Charlton

## 2022-07-12 NOTE — PROGRESS NOTES
[facility]   Initial Office Visit  Chief Complaint   Patient presents with    Consult     Multiple lung nodules on ct, Dr. Page referred       SUBJECTIVE:     History of Present Illness:  Patient is a 66 y.o. male presents for abnormal lung cancer screening CT of the chest. Pulmonary evaluation for multiple pulmonary nodules     referred by Nicole Pgae NP    Patient reports for work in a [employer].  Has smoked 1 pack per day for 43 years    History of shortness of breath on Symbicort and Spiriva without much benefit.    History of coronary disease status post coronary bypass graft    Patient denies drinking alcohol    Exposures: worked at [employer] for 25 yrs    Smoking Hx: currently still smoking since age 20 or so 1 PPD.    CAD CABG> 25 years; Stant > 1 year    Past Medical History:   Diagnosis Date    Adenomatous colon polyp 2011    Adenomatous colon polyp     Anxiety     Blood transfusion     CHF (congestive heart failure)     Emphysema of lung     Hypertension     S/P coronary artery stent placement 2013    Staph skin infection     abd/ lanced x 2    Ulcer      Past Surgical History:   Procedure Laterality Date    COLONOSCOPY  2011    Dr Choudhary, tubular adenoma    CORONARY ARTERY BYPASS GRAFT      CORONARY STENT PLACEMENT       Family History   Problem Relation Age of Onset    Heart disease Mother     Heart disease Father     Diabetes Father     Heart disease Maternal Aunt     No Known Problems Sister     No Known Problems Brother     No Known Problems Daughter     No Known Problems Son      Social History     Tobacco Use    Smoking status: Former Smoker     Packs/day: 0.75     Years: 42.00     Pack years: 31.50     Types: Cigarettes     Quit date: 2018     Years since quittin.1    Smokeless tobacco: Never Used    Tobacco comment: patient room 7    Substance Use Topics    Alcohol use: No     Alcohol/week: 0.0 standard  drinks    Drug use: No        Review of patient's allergies indicates:   Allergen Reactions    Atorvastatin      Other reaction(s): Rash       Current Outpatient Medications on File Prior to Visit   Medication Sig Dispense Refill    albuterol (PROVENTIL HFA) 90 mcg/actuation inhaler Inhale 2 puffs into the lungs every 4 (four) hours as needed for Wheezing. Every 4-6 hours PRN 18 g 6    aspirin (ECOTRIN) 81 MG EC tablet Take 81 mg by mouth once daily.      budesonide-formoterol 160-4.5 mcg (SYMBICORT) 160-4.5 mcg/actuation HFAA Inhale 2 puffs into the lungs every 12 (twelve) hours. Twice a day 10.2 g 5    clopidogreL (PLAVIX) 75 mg tablet Take 1 tablet (75 mg total) by mouth once daily. Need office visit before next refill, last seen 1/2019. This will be the LAST Rx if visit is not made. 90 tablet 3    coenzyme Q10-vitamin E 100-100 mg-unit Cap Take 200 mg by mouth once daily. Every day      DOCOSAHEXANOIC ACID/EPA (FISH OIL ORAL) Take 1 capsule by mouth 2 (two) times daily as needed. Twice a day      EScitalopram oxalate (LEXAPRO) 10 MG tablet Take 1 tablet (10 mg total) by mouth once daily. 90 tablet 3    metoprolol succinate (TOPROL-XL) 50 MG 24 hr tablet Take 1 tablet (50 mg total) by mouth every evening. 90 tablet 3    multivitamin (THERAGRAN) per tablet Take 1 tablet by mouth once daily. Every day      rosuvastatin (CRESTOR) 20 MG tablet Take 1 tablet (20 mg total) by mouth once daily. 90 tablet 3    sildenafil (REVATIO) 20 mg Tab Take 1 tablet (20 mg total) by mouth 3 (three) times daily. 30 tablet 11    traZODone (DESYREL) 50 MG tablet Take 1 tablet (50 mg total) by mouth every evening. 90 tablet 3     No current facility-administered medications on file prior to visit.         Review of Systems     Constitutional: Negative unless otherwise commented above  HENT: Negative unless otherwise commented above  Eyes: Negative unless otherwise commented above  Respiratory: Negative unless otherwise  "commented above  Cardiovascular: Negative unless otherwise commented above  Musculoskeletal: Negative unless otherwise commented above  Skin: Negative unless otherwise commented above  Neurological: Negative unless otherwise commented above  Hematological: Negative unless otherwise commented above  Endocrine: Negative unless otherwise commented above    OBJECTIVE:     Vital Signs (Most Recent)  Visit Vitals  /69 (BP Location: Left arm, Patient Position: Sitting)   Pulse 66   Temp 97.5 °F (36.4 °C) (Temporal)   Resp 16   Ht 5' 7.5" (1.715 m)   Wt 75.7 kg (166 lb 14.2 oz)   SpO2 (!) 92%   BMI 25.75 kg/m²     5' 7.5" (1.715 m)  75.7 kg (166 lb 14.2 oz)     Physical Exam:    General: WD WN   Eye: Extraocular movements are intact, Normal conjunctiva.  No scleral icterus  HENT: Normocephalic, Oral mucosa is moist, No pharyngeal erythema, clear oropharynx  Neck: Supple, Non-tender, No jugular venous distention.  Respiratory: mod wheezing and ronchi   Cardiovascular: significant clubbing   Extremities: Clubbing  Gastrointestinal: Soft, Non-tender, Non-distended   Lymphatics: No lymphadenopathy neck, supraclavicular.   Integumentary: Warm, Dry.   Neurologic: Alert, Oriented, Normal motor function, No focal defects.       Diagnostic Results:    PFT Results  FEV1 2.48 975%) DlCO 8 ( 27%) %  No data recorded   No data recorded  No data recorded  No data recorded  No data recorded  No data recorded  No data recorded  No data recorded  No data recorded  No data recorded  No data recorded  SpO2: (!) 92 % (room air)      Significant Imaging:  CXR: LEIGHTON nodule hypermetabolic. Station 4L or 5 L hypermetabolic. 11Ri appears hypermetabolic     Test(s) Reviewed  I have personally reviewed the following in detail:  [x] Chest Xray Images   [x] CT Images   [] Echocardiogram   [] Labs   [] Other:       Assessment:         ICD-10-CM ICD-9-CM   1. Bilateral hearing loss, unspecified hearing loss type  H91.93 389.9   2. Coronary " artery disease involving native coronary artery of native heart without angina pectoris  I25.10 414.01   3. S/P CABG x 3, 2007  Z95.1 V45.81   4. History of smoking greater than 50 pack years, quit 5/2018, close to 100 pack-year  Z87.891 V15.82   5. Personal history of nicotine dependence   Z87.891 V15.82   6. Abnormal PET scan of lung  R94.2 794.2        Plan:   Bilateral hearing loss, unspecified hearing loss type    Coronary artery disease involving native coronary artery of native heart without angina pectoris    S/P CABG x 3, 2007    History of smoking greater than 50 pack years, quit 5/2018, close to 100 pack-year    Personal history of nicotine dependence     Abnormal PET scan of lung     LEIGHTON lesion hard to get.  Will try lungVision reconstruction to see if we can navigate  Lung function may not allow lobectomy due to low dlCO    No follow-ups on file.     See labs and med orders.    Medications have been reviewed and reconciled.      Portions of this note may have been created with voice recognition software.  Grammatical, syntax and spelling errors may be inevitable.

## 2022-07-14 DIAGNOSIS — I10 ESSENTIAL HYPERTENSION: ICD-10-CM

## 2022-07-14 DIAGNOSIS — I25.10 CORONARY ARTERY DISEASE INVOLVING NATIVE CORONARY ARTERY WITHOUT ANGINA PECTORIS, UNSPECIFIED WHETHER NATIVE OR TRANSPLANTED HEART: ICD-10-CM

## 2022-07-14 DIAGNOSIS — I73.9 PAD (PERIPHERAL ARTERY DISEASE): ICD-10-CM

## 2022-07-14 RX ORDER — CLOPIDOGREL BISULFATE 75 MG/1
75 TABLET ORAL DAILY
Qty: 90 TABLET | Refills: 3 | Status: CANCELLED | OUTPATIENT
Start: 2022-07-14

## 2022-07-14 NOTE — PROGRESS NOTES
Spoke to wife on phone. States pt received stent in 2013. On plavix daily filled by PCP who is out of office.   EBUS scheduled at [facility].     Risk for CAV is low due to length of time from stent placement. Due to no relationship with cardiologist, pulmonary approves holding plavix prior to EBUS.

## 2022-07-18 ENCOUNTER — TELEPHONE (OUTPATIENT)
Dept: HEMATOLOGY/ONCOLOGY | Facility: CLINIC | Age: 67
End: 2022-07-18
Payer: MEDICARE

## 2022-07-18 NOTE — NURSING
Patient scheduled for EBUS f/u as requested by Dr. Sandra. Appt info provided to patient's wife and she verbalized understanding

## 2022-07-21 ENCOUNTER — TELEPHONE (OUTPATIENT)
Dept: RADIATION ONCOLOGY | Facility: CLINIC | Age: 67
End: 2022-07-21
Payer: MEDICARE

## 2022-07-21 ENCOUNTER — OFFICE VISIT (OUTPATIENT)
Dept: PULMONOLOGY | Facility: CLINIC | Age: 67
End: 2022-07-21
Payer: MEDICARE

## 2022-07-21 ENCOUNTER — TELEPHONE (OUTPATIENT)
Dept: HEMATOLOGY/ONCOLOGY | Facility: CLINIC | Age: 67
End: 2022-07-21
Payer: MEDICARE

## 2022-07-21 VITALS
BODY MASS INDEX: 23.8 KG/M2 | HEIGHT: 71 IN | SYSTOLIC BLOOD PRESSURE: 113 MMHG | RESPIRATION RATE: 12 BRPM | HEART RATE: 66 BPM | WEIGHT: 170 LBS | DIASTOLIC BLOOD PRESSURE: 65 MMHG | OXYGEN SATURATION: 95 %

## 2022-07-21 DIAGNOSIS — J61 ASBESTOSIS: ICD-10-CM

## 2022-07-21 DIAGNOSIS — F17.200 TOBACCO DEPENDENCE: ICD-10-CM

## 2022-07-21 DIAGNOSIS — J96.11 CHRONIC HYPOXEMIC RESPIRATORY FAILURE: Primary | ICD-10-CM

## 2022-07-21 DIAGNOSIS — J84.10 PULMONARY FIBROSIS: ICD-10-CM

## 2022-07-21 DIAGNOSIS — R94.2 ABNORMAL PET SCAN OF LUNG: Primary | ICD-10-CM

## 2022-07-21 DIAGNOSIS — I27.20 PULMONARY HTN: ICD-10-CM

## 2022-07-21 DIAGNOSIS — J18.9 PNEUMONIA OF LEFT UPPER LOBE DUE TO INFECTIOUS ORGANISM: ICD-10-CM

## 2022-07-21 PROBLEM — Z87.891 HISTORY OF SMOKING GREATER THAN 50 PACK YEARS: Status: RESOLVED | Noted: 2019-01-09 | Resolved: 2022-07-21

## 2022-07-21 PROBLEM — Z87.891 PERSONAL HISTORY OF NICOTINE DEPENDENCE: Status: RESOLVED | Noted: 2020-03-30 | Resolved: 2022-07-21

## 2022-07-21 PROCEDURE — 3288F FALL RISK ASSESSMENT DOCD: CPT | Mod: CPTII,S$GLB,, | Performed by: INTERNAL MEDICINE

## 2022-07-21 PROCEDURE — 1126F PR PAIN SEVERITY QUANTIFIED, NO PAIN PRESENT: ICD-10-PCS | Mod: CPTII,S$GLB,, | Performed by: INTERNAL MEDICINE

## 2022-07-21 PROCEDURE — 3078F DIAST BP <80 MM HG: CPT | Mod: CPTII,S$GLB,, | Performed by: INTERNAL MEDICINE

## 2022-07-21 PROCEDURE — 1101F PT FALLS ASSESS-DOCD LE1/YR: CPT | Mod: CPTII,S$GLB,, | Performed by: INTERNAL MEDICINE

## 2022-07-21 PROCEDURE — 3074F PR MOST RECENT SYSTOLIC BLOOD PRESSURE < 130 MM HG: ICD-10-PCS | Mod: CPTII,S$GLB,, | Performed by: INTERNAL MEDICINE

## 2022-07-21 PROCEDURE — 1159F PR MEDICATION LIST DOCUMENTED IN MEDICAL RECORD: ICD-10-PCS | Mod: CPTII,S$GLB,, | Performed by: INTERNAL MEDICINE

## 2022-07-21 PROCEDURE — 3008F PR BODY MASS INDEX (BMI) DOCUMENTED: ICD-10-PCS | Mod: CPTII,S$GLB,, | Performed by: INTERNAL MEDICINE

## 2022-07-21 PROCEDURE — 1159F MED LIST DOCD IN RCRD: CPT | Mod: CPTII,S$GLB,, | Performed by: INTERNAL MEDICINE

## 2022-07-21 PROCEDURE — 3288F PR FALLS RISK ASSESSMENT DOCUMENTED: ICD-10-PCS | Mod: CPTII,S$GLB,, | Performed by: INTERNAL MEDICINE

## 2022-07-21 PROCEDURE — 99999 PR PBB SHADOW E&M-EST. PATIENT-LVL V: CPT | Mod: PBBFAC,,, | Performed by: INTERNAL MEDICINE

## 2022-07-21 PROCEDURE — 99214 PR OFFICE/OUTPT VISIT, EST, LEVL IV, 30-39 MIN: ICD-10-PCS | Mod: S$GLB,,, | Performed by: INTERNAL MEDICINE

## 2022-07-21 PROCEDURE — 1101F PR PT FALLS ASSESS DOC 0-1 FALLS W/OUT INJ PAST YR: ICD-10-PCS | Mod: CPTII,S$GLB,, | Performed by: INTERNAL MEDICINE

## 2022-07-21 PROCEDURE — 3074F SYST BP LT 130 MM HG: CPT | Mod: CPTII,S$GLB,, | Performed by: INTERNAL MEDICINE

## 2022-07-21 PROCEDURE — 1126F AMNT PAIN NOTED NONE PRSNT: CPT | Mod: CPTII,S$GLB,, | Performed by: INTERNAL MEDICINE

## 2022-07-21 PROCEDURE — 3008F BODY MASS INDEX DOCD: CPT | Mod: CPTII,S$GLB,, | Performed by: INTERNAL MEDICINE

## 2022-07-21 PROCEDURE — 3078F PR MOST RECENT DIASTOLIC BLOOD PRESSURE < 80 MM HG: ICD-10-PCS | Mod: CPTII,S$GLB,, | Performed by: INTERNAL MEDICINE

## 2022-07-21 PROCEDURE — 99214 OFFICE O/P EST MOD 30 MIN: CPT | Mod: S$GLB,,, | Performed by: INTERNAL MEDICINE

## 2022-07-21 PROCEDURE — 99999 PR PBB SHADOW E&M-EST. PATIENT-LVL V: ICD-10-PCS | Mod: PBBFAC,,, | Performed by: INTERNAL MEDICINE

## 2022-07-21 RX ORDER — LEVOFLOXACIN 750 MG/1
750 TABLET ORAL DAILY
Qty: 7 TABLET | Refills: 0 | Status: SHIPPED | OUTPATIENT
Start: 2022-07-21 | End: 2022-09-22 | Stop reason: ALTCHOICE

## 2022-07-21 NOTE — PATIENT INSTRUCTIONS
Lung scarring - pulmonary fibrosis- is very severe end stage-- suspect due to occupational dust exposure   Treat pneumonia first then will consider med called pirfenadone in future  Continue oxygen, need to wear any time you are being active to keep sats >90%  Take levaquin 7 days for suspected pneumonia in the left upper lobe  Get chest x-ray after completing antibiotics  Repeat CT scan in 2 mos to ensure pneumonia is improving  Repeat PFT 2 months   Treat pneumonia first then will consider antifibrotic med called pirfenadone (Esbriet) in future

## 2022-07-21 NOTE — PROGRESS NOTES
2022    Srinivasa Oliver  Follow up- pt previously seen by Nicole Page    Chief Complaint   Patient presents with    Follow-up     Review results        HPI:   2022- Pt is a 67 yo male with CAD s/p CABG, atrial fib, pulmonary fibrosis, 2.8 cm PET avid lung nodule who is new to me and recently established care at our office. Case discussed w/ NP and imaging reviewed. He was sent for navigational bronch, ebus ( per Dr. Sandra) with bx of LEIGHTON nodule and LN biopsies- path negative for malignancy  Fri pt had SOB and heart palpitations  Pt just found out about pulmonary fibrosis however findings present on imaging since   He was just started on home O2, has POC  He smokes 1 ppd- since age 18. He was able to quit using smoking cessation classes in past but started again due to stress w/ wife's health. He has tried chantix twice w/ no success. Also vapes nicotine about 6 months now.  He gets short of breath mowing the lawn.  Coughs w white phlegm, phlegm from nose  Wife assists w/ history. Pt is a poor historian.  Pt was a , , worked in [place], retired  and had worked 26 yrs at [employer]. 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  Family hx- sister  of lung cancer. No family hx of pulm fibrosis.    2022-   Scheduling lung strength test, this must be done  Will wait on PET scan results. Expect to have tissue biopsy at hospital.   screening Ct by PCP showed diffuse emphysema and multiple nodules. Scheduled for PET scan.   Compliant of SOB onset years, worse with exertion, improves with rest. On symbicort and albuterol rescue with benefit.   Cough- onset 6 months, worse in mornings, productive clear mucous. Associated with wheeze. Has nocturnal coughing fits.     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, Smoking Hx; currently smoking 1 pack  daily, 43 pack years  Family Hx: no Lung Cancer, no COPD, no Asthma  Medical Hx: no previous pneumonia ; no previous shoulder surgery, CABG surgery       The chief compliant  problem is new to me  PFSH:  Past Medical History:   Diagnosis Date    Adenomatous colon polyp 2011    Adenomatous colon polyp     Anxiety     Blood transfusion     CHF (congestive heart failure)     Emphysema of lung     Hypertension     Renal disorder     kidney stones    S/P coronary artery stent placement 2013    Staph skin infection     abd/ lanced x 2    Ulcer          Past Surgical History:   Procedure Laterality Date    COLONOSCOPY  2011    Dr Choudhary, tubular adenoma    CORONARY ARTERY BYPASS GRAFT      CORONARY STENT PLACEMENT      ENDOBRONCHIAL ULTRASOUND N/A 2022    Procedure: ENDOBRONCHIAL ULTRASOUND (EBUS);  Surgeon: Alli Sandra MD;  Location: [facility];  Service: Pulmonary;  Laterality: N/A;    NAVIGATIONAL BRONCHOSCOPY Bilateral 2022    Procedure: BRONCHOSCOPY, NAVIGATIONAL;  Surgeon: Alli Sandra MD;  Location: [facility];  Service: Pulmonary;  Laterality: Bilateral;     Social History     Tobacco Use    Smoking status: Current Every Day Smoker     Packs/day: 0.75     Years: 42.00     Pack years: 31.50     Types: Cigarettes     Last attempt to quit: 2018     Years since quittin.1    Smokeless tobacco: Never Used    Tobacco comment: patient room 7    Substance Use Topics    Alcohol use: No     Alcohol/week: 0.0 standard drinks    Drug use: No     Family History   Problem Relation Age of Onset    Heart disease Mother     Heart disease Father     Diabetes Father     Heart disease Maternal Aunt     No Known Problems Sister     No Known Problems Brother     No Known Problems Daughter     No Known Problems Son      Review of patient's allergies indicates:   Allergen Reactions    Atorvastatin      Other reaction(s): Rash     I have reviewed past medical,  "family, and social history. I have reviewed previous nurse notes.    Performance Status:The patient's activity level is functions out of house.      Review of Systems:  a review of eleven systems covering constitutional, Eye, HEENT, Psych, Respiratory, Cardiac, GI, , Musculoskeletal, Endocrine, Dermatologic was negative except for pertinent findings as listed ABOVE and below:  Diarrhea- chronic     Exam:Comprehensive exam done. /65 (BP Location: Right arm, Patient Position: Sitting)   Pulse 66   Resp 12   Ht 5' 11" (1.803 m)   Wt 77.1 kg (169 lb 15.6 oz)   SpO2 95% Comment: on room air at rest  BMI 23.71 kg/m²   Exam included Vitals as listed, and patient's appearance and affect and alertness and mood, oral exam for yeast and hygiene and pharynx lesions and Mallapatti (M) score, neck with inspection for jvd and masses and thyroid abnormalities and lymph nodes (supraclavicular and infraclavicular nodes and axillary also examined and noted if abn), chest exam included symmetry and effort and fremitus and percussion and auscultation, cardiac exam included rhythm and gallops and murmur and rubs and jvd and edema, abdominal exam for mass and hepatosplenomegaly and tenderness and hernias and bowel sounds, Musculoskeletal exam with muscle tone and posture and mobility/gait and  strength, and skin for rashes and cyanosis and pallor and turgor, extremity for clubbing.  Findings were normal except for pertinent findings listed below:  M4, edentulous  HR irregularly irreg  Breath sounds with scattered rales and crackles bilaterally  Significant clubbing bilat nails  No edema    Radiographs (ct chest and cxr) reviewed: view by direct vision  PET CT 7/7/22-  LEIGHTON PET avid nodular consolidation appears similar size to previous, also with PET avid mediastinal adenopathy  1. Multiple contiguous hypermetabolic pleural based pulmonary opacities in the left upper lobe of the lung, nonspecific. Given appearance and " morphology, these could be of infectious or inflammatory etiology. Consider short-term chest CT follow-up in 3 months following antibiotic therapy. Alternatively, these could be biopsied for tissue diagnosis however this patient is a high risk for pneumothorax with CT-guided lung biopsy, given underlying parenchymal lung disease and emphysema.  2. No additional findings of FDG avid primary neoplasm or metastatic disease.  3. Several prominent to mildly enlarged mediastinal lymph nodes which are very mildly hypermetabolic, and stable compared to multiple prior CT exams, most likely incidental and or reactive.  4. Additional observations as described.    CT Chest Lung Screening Low Dose  06/20/2022 - there is very severe pulmonary fibrosis features include peripheral predominant, even distribution in the upper and lower lobes w/ honeycombing, bronchiectasis and reticulations.  LEIGHTON pleural based nodular consolidation without distrete borders. Fibrosis is ?slightly progressed compared to prior CT 4/2021 but definitely progressive since 2020    CT chest 2009- lower lobe ground glass, milder ILD w/ reticulations and peripheral honeycombing (beto upper lobes)       Transthoracic echo (TTE) complete 1/15/19   The estimated ejection fraction is 60%. Grade 1 diastolic dysfunction    Mild elevated PA systolic pressure, 43 mm Hg by calculation.       Labs reviewed        Latest Reference Range & Units 06/16/22 08:35   AST 10 - 40 U/L 12   ALT 10 - 44 U/L <5 (L)      Latest Reference Range & Units 06/16/22 08:35   Eos # 0.0 - 0.5 K/uL 0.1     PFT reviewed- 6/24/22- restriction, severe reduction in dlco  Pulmonary Functions Testing Results:        6mwt 6/24/22- 255m, O2 reqt 3-4L    Plan:  Clinical impression is apparently straight forward and impression with management as below. Very advanced pulm fibrosis, suspect related to occupational dust exposure. Chronic hypoxemia, pulm htn needs reevaluation. Treating empirically for pna  LEIGHTON- biopsied and neg for malignancy    Srinivasa was seen today for follow-up.    Diagnoses and all orders for this visit:    Chronic hypoxemic respiratory failure  -     Echo; Future    Pulmonary fibrosis  -     Complete PFT without bronchodilator - Hospital; Future  -     X-Ray Chest PA And Lateral; Future  -     CT Chest Without Contrast; Future    Tobacco dependence    Pulmonary HTN    Asbestosis    Pneumonia of left upper lobe due to infectious organism  -     levoFLOXacin (LEVAQUIN) 750 MG tablet; Take 1 tablet (750 mg total) by mouth once daily.        Follow up in about 2 months (around 9/21/2022).    Discussed with patient above for education the following:      Patient Instructions   Lung scarring - pulmonary fibrosis- is very severe end stage-- suspect due to occupational dust exposure   Treat pneumonia first then will consider med called pirfenadone in future  Continue oxygen, need to wear any time you are being active to keep sats >90%  Take levaquin 7 days for suspected pneumonia in the left upper lobe  Get chest x-ray after completing antibiotics  Repeat CT scan in 2 mos to ensure pneumonia is improving  Repeat PFT 2 months   Treat pneumonia first then will consider antifibrotic med called pirfenadone (Esbriet) in future

## 2022-07-21 NOTE — NURSING
Per Dr. Sandra, add patient to Tumor Conference 7/26/22 and refer to Rad/Onc. Order in [facility] and  Msg sent to Rad/Onc for scheduling.

## 2022-07-25 ENCOUNTER — TUMOR BOARD CONFERENCE (OUTPATIENT)
Dept: HEMATOLOGY/ONCOLOGY | Facility: CLINIC | Age: 67
End: 2022-07-25
Payer: MEDICARE

## 2022-07-25 NOTE — PROGRESS NOTES
[facility]      THORACIC MULTIDISCIPLINARY TUMOR BOARD  PATIENT REVIEW FORM  ________________________________________________________________________    CLINIC #: [id number]  DATE: 07/25/2022    TUMOR SITE:   Lung nodule     PRESENTER:   Dr. 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 Few small less avid disease.     Recs from 7/12/22 tumor conference: Dr. Sandra will attempt bx with lung Vision  7/25/22: Bronch per Dr. Sandra LEIGHTON lesion  Began abx and sx have improved     BOARD RECOMMENDATIONS:   Re-image in one month CT chest with 0.625 mm slices/protocol in order to determine plan for possible bx/robotic bronch    CONSULT NEEDED:     [] Surgery    [] Hem/Onc    [] Rad/Onc    [] Dietary                 [] Social Service    [] Psychology       [] Pulmonology      Unstageable:      [] Yes     [] No  Metastatic site(s):          [x] Selena'l Treatment Guidelines reviewed and care planned is consistent with guidelines.         (i.e., NCCN, NCI, PD, ACO, AUA, etc.)    PRESENTATION AT CANCER CONFERENCE:         [] Prospective    [] Retrospective     [x] Follow-Up          [] Eligible for clinical trial      Tresa Charlton

## 2022-07-26 ENCOUNTER — OFFICE VISIT (OUTPATIENT)
Dept: PULMONOLOGY | Facility: CLINIC | Age: 67
End: 2022-07-26
Payer: MEDICARE

## 2022-07-26 ENCOUNTER — TELEPHONE (OUTPATIENT)
Dept: HEMATOLOGY/ONCOLOGY | Facility: CLINIC | Age: 67
End: 2022-07-26
Payer: MEDICARE

## 2022-07-26 VITALS
HEART RATE: 73 BPM | OXYGEN SATURATION: 93 % | DIASTOLIC BLOOD PRESSURE: 66 MMHG | SYSTOLIC BLOOD PRESSURE: 106 MMHG | WEIGHT: 170 LBS | BODY MASS INDEX: 23.8 KG/M2 | RESPIRATION RATE: 16 BRPM | TEMPERATURE: 98 F | HEIGHT: 71 IN

## 2022-07-26 DIAGNOSIS — J84.10 PULMONARY FIBROSIS: ICD-10-CM

## 2022-07-26 DIAGNOSIS — H91.93 BILATERAL HEARING LOSS, UNSPECIFIED HEARING LOSS TYPE: ICD-10-CM

## 2022-07-26 DIAGNOSIS — F17.200 TOBACCO DEPENDENCE: Primary | ICD-10-CM

## 2022-07-26 DIAGNOSIS — R94.2 ABNORMAL PET SCAN OF LUNG: ICD-10-CM

## 2022-07-26 DIAGNOSIS — R91.1 LUNG NODULE: ICD-10-CM

## 2022-07-26 PROCEDURE — 3078F PR MOST RECENT DIASTOLIC BLOOD PRESSURE < 80 MM HG: ICD-10-PCS | Mod: CPTII,S$GLB,, | Performed by: INTERNAL MEDICINE

## 2022-07-26 PROCEDURE — 1101F PR PT FALLS ASSESS DOC 0-1 FALLS W/OUT INJ PAST YR: ICD-10-PCS | Mod: CPTII,S$GLB,, | Performed by: INTERNAL MEDICINE

## 2022-07-26 PROCEDURE — 1126F AMNT PAIN NOTED NONE PRSNT: CPT | Mod: CPTII,S$GLB,, | Performed by: INTERNAL MEDICINE

## 2022-07-26 PROCEDURE — 1159F MED LIST DOCD IN RCRD: CPT | Mod: CPTII,S$GLB,, | Performed by: INTERNAL MEDICINE

## 2022-07-26 PROCEDURE — 3074F PR MOST RECENT SYSTOLIC BLOOD PRESSURE < 130 MM HG: ICD-10-PCS | Mod: CPTII,S$GLB,, | Performed by: INTERNAL MEDICINE

## 2022-07-26 PROCEDURE — 3008F PR BODY MASS INDEX (BMI) DOCUMENTED: ICD-10-PCS | Mod: CPTII,S$GLB,, | Performed by: INTERNAL MEDICINE

## 2022-07-26 PROCEDURE — 3074F SYST BP LT 130 MM HG: CPT | Mod: CPTII,S$GLB,, | Performed by: INTERNAL MEDICINE

## 2022-07-26 PROCEDURE — 1126F PR PAIN SEVERITY QUANTIFIED, NO PAIN PRESENT: ICD-10-PCS | Mod: CPTII,S$GLB,, | Performed by: INTERNAL MEDICINE

## 2022-07-26 PROCEDURE — 3078F DIAST BP <80 MM HG: CPT | Mod: CPTII,S$GLB,, | Performed by: INTERNAL MEDICINE

## 2022-07-26 PROCEDURE — 1101F PT FALLS ASSESS-DOCD LE1/YR: CPT | Mod: CPTII,S$GLB,, | Performed by: INTERNAL MEDICINE

## 2022-07-26 PROCEDURE — 3288F PR FALLS RISK ASSESSMENT DOCUMENTED: ICD-10-PCS | Mod: CPTII,S$GLB,, | Performed by: INTERNAL MEDICINE

## 2022-07-26 PROCEDURE — 99999 PR PBB SHADOW E&M-EST. PATIENT-LVL IV: ICD-10-PCS | Mod: PBBFAC,,, | Performed by: INTERNAL MEDICINE

## 2022-07-26 PROCEDURE — 99499 RISK ADDL DX/OHS AUDIT: ICD-10-PCS | Mod: S$GLB,,, | Performed by: INTERNAL MEDICINE

## 2022-07-26 PROCEDURE — 99499 UNLISTED E&M SERVICE: CPT | Mod: S$GLB,,, | Performed by: INTERNAL MEDICINE

## 2022-07-26 PROCEDURE — 1159F PR MEDICATION LIST DOCUMENTED IN MEDICAL RECORD: ICD-10-PCS | Mod: CPTII,S$GLB,, | Performed by: INTERNAL MEDICINE

## 2022-07-26 PROCEDURE — 99214 OFFICE O/P EST MOD 30 MIN: CPT | Mod: S$GLB,,, | Performed by: INTERNAL MEDICINE

## 2022-07-26 PROCEDURE — 99999 PR PBB SHADOW E&M-EST. PATIENT-LVL IV: CPT | Mod: PBBFAC,,, | Performed by: INTERNAL MEDICINE

## 2022-07-26 PROCEDURE — 3008F BODY MASS INDEX DOCD: CPT | Mod: CPTII,S$GLB,, | Performed by: INTERNAL MEDICINE

## 2022-07-26 PROCEDURE — 99214 PR OFFICE/OUTPT VISIT, EST, LEVL IV, 30-39 MIN: ICD-10-PCS | Mod: S$GLB,,, | Performed by: INTERNAL MEDICINE

## 2022-07-26 PROCEDURE — 3288F FALL RISK ASSESSMENT DOCD: CPT | Mod: CPTII,S$GLB,, | Performed by: INTERNAL MEDICINE

## 2022-07-26 NOTE — NURSING
Met with patient and wife in multi disciplinary clinic today.  Plan per Dr. Sandra is CT in 4 weeks and obtain NODIFY. Discussed process of NODIFY Reviewed plan of care and answered questions.  My contact information was provided and pt was encouraged to call with any questions or concerns.  Pt and wife verbalized an understanding. NODIFY request faxed to [employer]

## 2022-07-26 NOTE — PROGRESS NOTES
[facility]   Clinic Follow Up  Chief Complaint   Patient presents with    Bilateral hearing loss, unspecified hearing loss type       SUBJECTIVE:     History of Present Illness:  Patient is a 66 y.o. male presents for follow-up for Mark bronch.    Patient reports Path negative for tumor on LEIGHTON anterior segment pleural based nodule. BAL negative for malignancty  EBUS staging stations 4R 4L, 7, 11L ( the LN > 5 mm) negative for malignancy.   Prescribed levofloxacin by Dr Flowers. To complete tomorrow ( 7 days)  Today for f/u  Significant exposure to asbestos and Dr Flowers considering pirfenidone.      Feels better; less cough but continues to smoke.     Recent admissions/ER visits/Urgent care: no      Past Medical History:   Diagnosis Date    Adenomatous colon polyp 01/28/2011    Adenomatous colon polyp     Anxiety     Blood transfusion     CHF (congestive heart failure)     Emphysema of lung     Hypertension     Renal disorder     kidney stones    S/P coronary artery stent placement 01/07/2013    Staph skin infection     abd/ lanced x 2    Ulcer      Past Surgical History:   Procedure Laterality Date    COLONOSCOPY  01/28/2011    Dr Choudhary, tubular adenoma    CORONARY ARTERY BYPASS GRAFT  2005    CORONARY STENT PLACEMENT      ENDOBRONCHIAL ULTRASOUND N/A 7/18/2022    Procedure: ENDOBRONCHIAL ULTRASOUND (EBUS);  Surgeon: Alli Sandra MD;  Location: [facility];  Service: Pulmonary;  Laterality: N/A;    NAVIGATIONAL BRONCHOSCOPY Bilateral 7/18/2022    Procedure: BRONCHOSCOPY, NAVIGATIONAL;  Surgeon: Alli Sandra MD;  Location: [facility];  Service: Pulmonary;  Laterality: Bilateral;     Family History   Problem Relation Age of Onset    Heart disease Mother     Heart disease Father     Diabetes Father     Heart disease Maternal Aunt     No Known Problems Sister     No Known Problems Brother     No Known Problems Daughter     No Known Problems Son      Social History      Tobacco Use    Smoking status: Current Every Day Smoker     Packs/day: 0.75     Years: 42.00     Pack years: 31.50     Types: Cigarettes     Last attempt to quit: 2018     Years since quittin.1    Smokeless tobacco: Never Used    Tobacco comment: patient room 7    Substance Use Topics    Alcohol use: No     Alcohol/week: 0.0 standard drinks    Drug use: No        Review of patient's allergies indicates:   Allergen Reactions    Atorvastatin      Other reaction(s): Rash       Current Outpatient Medications on File Prior to Visit   Medication Sig Dispense Refill    albuterol (PROVENTIL HFA) 90 mcg/actuation inhaler Inhale 2 puffs into the lungs every 4 (four) hours as needed for Wheezing. Every 4-6 hours PRN 18 g 6    ascorbic acid, vitamin C, (VITAMIN C) 1000 MG tablet Take 1,000 mg by mouth once daily.      aspirin (ECOTRIN) 81 MG EC tablet Take 81 mg by mouth once daily.      clopidogreL (PLAVIX) 75 mg tablet Take 1 tablet (75 mg total) by mouth once daily. Need office visit before next refill, last seen 2019. This will be the LAST Rx if visit is not made. 90 tablet 3    coenzyme Q10-vitamin E 100-100 mg-unit Cap Take 200 mg by mouth once daily. Every day      DOCOSAHEXANOIC ACID/EPA (FISH OIL ORAL) Take 1 capsule by mouth 2 (two) times daily as needed. Twice a day      EScitalopram oxalate (LEXAPRO) 10 MG tablet Take 1 tablet (10 mg total) by mouth once daily. (Patient taking differently: Take 10 mg by mouth every evening.) 90 tablet 3    ferrous sulfate (FEOSOL) 325 mg (65 mg iron) Tab tablet Take 325 mg by mouth daily with breakfast.      ipratropium-albuteroL (COMBIVENT RESPIMAT)  mcg/actuation inhaler Inhale 1 puff into the lungs every 6 (six) hours as needed for Wheezing. Rescue      levoFLOXacin (LEVAQUIN) 750 MG tablet Take 1 tablet (750 mg total) by mouth once daily. 7 tablet 0    metoprolol succinate (TOPROL-XL) 50 MG 24 hr tablet Take 1 tablet (50 mg total) by mouth  "every evening. (Patient taking differently: Take 50 mg by mouth once daily.) 90 tablet 3    rosuvastatin (CRESTOR) 20 MG tablet Take 1 tablet (20 mg total) by mouth once daily. (Patient taking differently: Take 20 mg by mouth every evening.) 90 tablet 3    traZODone (DESYREL) 50 MG tablet Take 1 tablet (50 mg total) by mouth every evening. (Patient taking differently: Take 50 mg by mouth nightly as needed.) 90 tablet 3     No current facility-administered medications on file prior to visit.         Review of Systems     Constitutional: Negative unless otherwise commented above  HENT: Negative unless otherwise commented above  Eyes: Negative unless otherwise commented above  Respiratory: Negative unless otherwise commented above  Cardiovascular: Negative unless otherwise commented above  Musculoskeletal: Negative unless otherwise commented above  Skin: Negative unless otherwise commented above  Neurological: Negative unless otherwise commented above  Hematological: Negative unless otherwise commented above  Endocrine: Negative unless otherwise commented above    OBJECTIVE:     Vital Signs (Most Recent)  Visit Vitals  /66 (BP Location: Left arm, Patient Position: Sitting)   Pulse 73   Temp 97.9 °F (36.6 °C) (Temporal)   Resp 16   Ht 5' 11" (1.803 m)   Wt 77.1 kg (169 lb 15.6 oz)   SpO2 (!) 93%   BMI 23.71 kg/m²     5' 11" (1.803 m)  77.1 kg (169 lb 15.6 oz)     Physical Exam:    General: Alert and oriented, No acute distress.   Eye: Extraocular movements are intact, Normal conjunctiva.  No scleral icterus  HENT: Normocephalic, Normal hearing, Oral mucosa is moist, No pharyngeal erythema.   Neck: Supple, Non-tender, No jugular venous distention.  Respiratory: decreased BS   Cardiovascular: RRR   Extremities: significant clubbing  Gastrointestinal: Soft, Non-tender, Non-distended   Lymphatics: No lymphadenopathy appreciated, no masses.   Integumentary: Warm, Dry.   Neurologic: Moves all extremities, No focal " defects.     Diagnostic Results:    PFT Results  N/A  No data recorded   No data recorded  No data recorded  No data recorded  No data recorded  No data recorded  No data recorded  No data recorded  No data recorded  No data recorded  No data recorded  SpO2: (!) 93 %      Significant Imaging:  CXR: reviewed Ct/ PET and fluoro pictures from the case with patient    Test(s) Reviewed  I have personally reviewed the following in detail:  [] Chest Xray Images   [x] CT Images   [] Echocardiogram   [] Labs   [] Other:       Assessment:         ICD-10-CM ICD-9-CM   1. Tobacco dependence  F17.200 305.1   2. Abnormal PET scan of lung  R94.2 794.2   3. Pulmonary fibrosis  J84.10 515   4. Bilateral hearing loss, unspecified hearing loss type  H91.93 389.9   5. Lung nodule  R91.1 793.11        Plan:   Tobacco dependence    Abnormal PET scan of lung    Pulmonary fibrosis    Bilateral hearing loss, unspecified hearing loss type    Lung nodule     started on antibiotics; copious amounts of mucus plugs removed during bronch.  Pt symptomatically better.     Dr Flowers prescribed antibiotic.   It appears reasonable to obain f/u CT in 4 weeks. If better, no intervention needed.     If unchanged or worse; empiric radiation.    Patient very concerned about development of PTX. CT biopsy very high risk given significant emphysema and pulm fibrosis      Discussed in multidisciplinary [facility].  Graciela Medina from rad Onc wanted to pursue tissue diagnosis.  Dr. Pham who was in conference and requested CT of the chest with thin slices 0.625 to try to plan robotic bronchoscopy    No follow-ups on file.     See labs and med orders.    Medications have been reviewed and reconciled.    Portions of this note may have been created with voice recognition software.  Grammatical, syntax and spelling errors may be inevitable.

## 2022-07-27 ENCOUNTER — TELEPHONE (OUTPATIENT)
Dept: HEMATOLOGY/ONCOLOGY | Facility: CLINIC | Age: 67
End: 2022-07-27
Payer: MEDICARE

## 2022-07-27 NOTE — NURSING
1530-Spoke with wife regarding CT scan appt at [facility]. Also, reviewed appt info to establish care with cardiology. She verbalized understanding and will call if she has any questions          Left msg on VM informing of upcoming appt for CT scan at [facility]. Awaiting return call from wife.

## 2022-08-22 NOTE — PROGRESS NOTES
[facility] Consult Note    Referring provider: Alli Sandra MD    Diagnosis:  Srinivasa Oliver is a 66 y.o. male with multiple left upper lobe peripheral/ pleural nodules concerning for a T3N0 malignancy, in the setting of severe interstitial fibrosis.    Oncologic History:  65 y/o with occupational hx of working in [employer], tobacco abuse, Coronary bypass graft, stent- onPlavix  · 22: CT lung screening: LIRADS 4A with 2.8cm right lung opacity. Severe interstitial fibrosis is noted.  · 22: restriction, with severe reduction in DLCO (27%)  · 22: PET/CT with multiple avid nodules extending along the left pleura, no additional pleural nodules. There are several mildly prominent mediastinal LN including an AP window stable compared to prior exams.  · 22: LEIGHTON bronchial biopsy, LEIGHTON anterior nodule, 4R, 7, 4L, 11L all negative for malignancy  · 22: Thoracic TB with recommendation for CT chest with 0.625 mm slices/protocol in order to determine plan for possible bx/robotic bronch   · 22: stable exam    History of Present Illness:  Srinivasa Oliver presents today to discuss radiation therapy.    He has a history of tobacco use and exposure to asbestos working in [employer]. He continues to smoke 1ppd.  He presents with his wife. He reports a stable cough productive of clear phlegm. He uses 4L of oxygen at home for the past 3 months. He denies chest pain.    Review of Systems:  ROS as above    Social History:  Social History     Tobacco Use    Smoking status: Current Every Day Smoker     Packs/day: 0.75     Years: 42.00     Pack years: 31.50     Types: Cigarettes     Last attempt to quit: 2018     Years since quittin.2    Smokeless tobacco: Never Used    Tobacco comment: patient room 7    Substance Use Topics    Alcohol use: No     Alcohol/week: 0.0 standard drinks    Drug use: No       Past Medical History:  Past Medical History:   Diagnosis Date    Adenomatous  colon polyp 01/28/2011    Adenomatous colon polyp     Anxiety     Blood transfusion     CHF (congestive heart failure)     Emphysema of lung     Hypertension     Renal disorder     kidney stones    S/P coronary artery stent placement 01/07/2013    Staph skin infection     abd/ lanced x 2    Ulcer        Past Surgical History:   Procedure Laterality Date    COLONOSCOPY  01/28/2011    Dr Choudhary, tubular adenoma    CORONARY ARTERY BYPASS GRAFT  2005    CORONARY STENT PLACEMENT      ENDOBRONCHIAL ULTRASOUND N/A 7/18/2022    Procedure: ENDOBRONCHIAL ULTRASOUND (EBUS);  Surgeon: Alli Sandra MD;  Location: [facility];  Service: Pulmonary;  Laterality: N/A;    NAVIGATIONAL BRONCHOSCOPY Bilateral 7/18/2022    Procedure: BRONCHOSCOPY, NAVIGATIONAL;  Surgeon: Alli Sandra MD;  Location: [facility];  Service: Pulmonary;  Laterality: Bilateral;       Cancer-related family history is not on file.    Medications:  Current Outpatient Medications on File Prior to Visit   Medication Sig Dispense Refill    albuterol (PROVENTIL HFA) 90 mcg/actuation inhaler Inhale 2 puffs into the lungs every 4 (four) hours as needed for Wheezing. Every 4-6 hours PRN 18 g 6    ascorbic acid, vitamin C, (VITAMIN C) 1000 MG tablet Take 1,000 mg by mouth once daily.      aspirin (ECOTRIN) 81 MG EC tablet Take 81 mg by mouth once daily.      clopidogreL (PLAVIX) 75 mg tablet Take 1 tablet (75 mg total) by mouth once daily. Need office visit before next refill, last seen 1/2019. This will be the LAST Rx if visit is not made. 90 tablet 3    coenzyme Q10-vitamin E 100-100 mg-unit Cap Take 200 mg by mouth once daily. Every day      DOCOSAHEXANOIC ACID/EPA (FISH OIL ORAL) Take 1 capsule by mouth 2 (two) times daily as needed. Twice a day      EScitalopram oxalate (LEXAPRO) 10 MG tablet Take 1 tablet (10 mg total) by mouth once daily. (Patient taking differently: Take 10 mg by mouth every evening.) 90 tablet 3    ferrous sulfate  "(FEOSOL) 325 mg (65 mg iron) Tab tablet Take 325 mg by mouth daily with breakfast.      ipratropium-albuteroL (COMBIVENT RESPIMAT)  mcg/actuation inhaler Inhale 1 puff into the lungs every 6 (six) hours as needed for Wheezing. Rescue      levoFLOXacin (LEVAQUIN) 750 MG tablet Take 1 tablet (750 mg total) by mouth once daily. 7 tablet 0    metoprolol succinate (TOPROL-XL) 50 MG 24 hr tablet Take 1 tablet (50 mg total) by mouth every evening. (Patient taking differently: Take 50 mg by mouth once daily.) 90 tablet 3    rosuvastatin (CRESTOR) 20 MG tablet Take 1 tablet (20 mg total) by mouth once daily. (Patient taking differently: Take 20 mg by mouth every evening.) 90 tablet 3    traZODone (DESYREL) 50 MG tablet Take 1 tablet (50 mg total) by mouth every evening. (Patient taking differently: Take 50 mg by mouth nightly as needed.) 90 tablet 3     No current facility-administered medications on file prior to visit.       Allergies:  Review of patient's allergies indicates:   Allergen Reactions    Atorvastatin      Other reaction(s): Rash       Exam:  Vitals:    08/24/22 0953   BP: (!) 120/56   BP Location: Right arm   Patient Position: Sitting   Pulse: 61   Resp: 20   Temp: 97.7 °F (36.5 °C)   SpO2: (!) 94%   Weight: 77.3 kg (170 lb 6.7 oz)   Height: 5' 11" (1.803 m)     Constitutional: Pleasant 66 y.o. male in no acute distress.  Well nourished. Well groomed.   HEENT: Normocephalic and atraumatic wearing a mask  Cardiovascular: Upper extremities warm to touch  Lungs: No audible wheezing.  Normal effort. On RA   Musculoskeletal: No gross MSK deformities.  Skin: No rashes appreciated.  Psych: Alert and oriented with appropriate mood and affect.  Neuro: Grossly normal.    Data Review:    Independent Interpretation of Test(s): CT chest from 8/22/22 and PET/CT 7/7/22 was personally reviewed with  And Mrs. Oliver.      Assessment:  multiple left upper lobe peripheral/ pleural nodules concerning for a T3N0 " malignancy, in the setting of severe interstitial fibrosis.   ECOG: (1) Restricted in physically strenuous activity, ambulatory and able to do work of light nature    Possibility of pregnancy: N/A  History of prior irradiation: No  History of prior systemic anti-cancer therapy: No  History of collagen vascular disease: No  Implanted electronic device (pacer/defib/nerve stimulator): No    Plan:  · Treatment options were discussed including biopsy, radiation, systemic therapy, some combination thereof and no treatment. Given stability on short term repeat imaging, the left sided lung disease remains concerning for malignancy.  His clinical picture is complicated by ILD, likely secondary to asbestos exposure and tobacco use. PFTs indicate DLCO of < 30% and GAP index is a score of 6.   · RT and ILD, notably IPF, has been shown to have an increased rate of G3+ pneumonitis. Given this increased risk, I do favor tissue sampling prior to empiric radiation. furthermore if he has any non radiation options, I favor pursuing those over RT. However with supplemental oxygen use and DLCO, I do not think he is a candidate for surgery.  He will see Dr. Sandra in September and I will see him back after that.   · I strongly recommended tobacco cessation, he has met with tobacco cessation but this was un successful.     Russell Laura MD  Radiation Oncology

## 2022-08-24 ENCOUNTER — OFFICE VISIT (OUTPATIENT)
Dept: RADIATION ONCOLOGY | Facility: CLINIC | Age: 67
End: 2022-08-24
Payer: MEDICARE

## 2022-08-24 VITALS
RESPIRATION RATE: 20 BRPM | BODY MASS INDEX: 23.86 KG/M2 | HEART RATE: 61 BPM | HEIGHT: 71 IN | SYSTOLIC BLOOD PRESSURE: 120 MMHG | DIASTOLIC BLOOD PRESSURE: 56 MMHG | TEMPERATURE: 98 F | WEIGHT: 170.44 LBS | OXYGEN SATURATION: 94 %

## 2022-08-24 DIAGNOSIS — R94.2 ABNORMAL PET SCAN OF LUNG: ICD-10-CM

## 2022-08-24 PROCEDURE — 3074F SYST BP LT 130 MM HG: CPT | Mod: CPTII,S$GLB,, | Performed by: STUDENT IN AN ORGANIZED HEALTH CARE EDUCATION/TRAINING PROGRAM

## 2022-08-24 PROCEDURE — 1126F AMNT PAIN NOTED NONE PRSNT: CPT | Mod: CPTII,S$GLB,, | Performed by: STUDENT IN AN ORGANIZED HEALTH CARE EDUCATION/TRAINING PROGRAM

## 2022-08-24 PROCEDURE — 3078F PR MOST RECENT DIASTOLIC BLOOD PRESSURE < 80 MM HG: ICD-10-PCS | Mod: CPTII,S$GLB,, | Performed by: STUDENT IN AN ORGANIZED HEALTH CARE EDUCATION/TRAINING PROGRAM

## 2022-08-24 PROCEDURE — 1126F PR PAIN SEVERITY QUANTIFIED, NO PAIN PRESENT: ICD-10-PCS | Mod: CPTII,S$GLB,, | Performed by: STUDENT IN AN ORGANIZED HEALTH CARE EDUCATION/TRAINING PROGRAM

## 2022-08-24 PROCEDURE — 99204 OFFICE O/P NEW MOD 45 MIN: CPT | Mod: S$GLB,,, | Performed by: STUDENT IN AN ORGANIZED HEALTH CARE EDUCATION/TRAINING PROGRAM

## 2022-08-24 PROCEDURE — 99204 PR OFFICE/OUTPT VISIT, NEW, LEVL IV, 45-59 MIN: ICD-10-PCS | Mod: S$GLB,,, | Performed by: STUDENT IN AN ORGANIZED HEALTH CARE EDUCATION/TRAINING PROGRAM

## 2022-08-24 PROCEDURE — 99999 PR PBB SHADOW E&M-EST. PATIENT-LVL IV: ICD-10-PCS | Mod: PBBFAC,,, | Performed by: STUDENT IN AN ORGANIZED HEALTH CARE EDUCATION/TRAINING PROGRAM

## 2022-08-24 PROCEDURE — 1159F PR MEDICATION LIST DOCUMENTED IN MEDICAL RECORD: ICD-10-PCS | Mod: CPTII,S$GLB,, | Performed by: STUDENT IN AN ORGANIZED HEALTH CARE EDUCATION/TRAINING PROGRAM

## 2022-08-24 PROCEDURE — 3008F PR BODY MASS INDEX (BMI) DOCUMENTED: ICD-10-PCS | Mod: CPTII,S$GLB,, | Performed by: STUDENT IN AN ORGANIZED HEALTH CARE EDUCATION/TRAINING PROGRAM

## 2022-08-24 PROCEDURE — 3078F DIAST BP <80 MM HG: CPT | Mod: CPTII,S$GLB,, | Performed by: STUDENT IN AN ORGANIZED HEALTH CARE EDUCATION/TRAINING PROGRAM

## 2022-08-24 PROCEDURE — 1101F PT FALLS ASSESS-DOCD LE1/YR: CPT | Mod: CPTII,S$GLB,, | Performed by: STUDENT IN AN ORGANIZED HEALTH CARE EDUCATION/TRAINING PROGRAM

## 2022-08-24 PROCEDURE — 3288F FALL RISK ASSESSMENT DOCD: CPT | Mod: CPTII,S$GLB,, | Performed by: STUDENT IN AN ORGANIZED HEALTH CARE EDUCATION/TRAINING PROGRAM

## 2022-08-24 PROCEDURE — 3008F BODY MASS INDEX DOCD: CPT | Mod: CPTII,S$GLB,, | Performed by: STUDENT IN AN ORGANIZED HEALTH CARE EDUCATION/TRAINING PROGRAM

## 2022-08-24 PROCEDURE — 99999 PR PBB SHADOW E&M-EST. PATIENT-LVL IV: CPT | Mod: PBBFAC,,, | Performed by: STUDENT IN AN ORGANIZED HEALTH CARE EDUCATION/TRAINING PROGRAM

## 2022-08-24 PROCEDURE — 3074F PR MOST RECENT SYSTOLIC BLOOD PRESSURE < 130 MM HG: ICD-10-PCS | Mod: CPTII,S$GLB,, | Performed by: STUDENT IN AN ORGANIZED HEALTH CARE EDUCATION/TRAINING PROGRAM

## 2022-08-24 PROCEDURE — 3288F PR FALLS RISK ASSESSMENT DOCUMENTED: ICD-10-PCS | Mod: CPTII,S$GLB,, | Performed by: STUDENT IN AN ORGANIZED HEALTH CARE EDUCATION/TRAINING PROGRAM

## 2022-08-24 PROCEDURE — 1159F MED LIST DOCD IN RCRD: CPT | Mod: CPTII,S$GLB,, | Performed by: STUDENT IN AN ORGANIZED HEALTH CARE EDUCATION/TRAINING PROGRAM

## 2022-08-24 PROCEDURE — 1101F PR PT FALLS ASSESS DOC 0-1 FALLS W/OUT INJ PAST YR: ICD-10-PCS | Mod: CPTII,S$GLB,, | Performed by: STUDENT IN AN ORGANIZED HEALTH CARE EDUCATION/TRAINING PROGRAM

## 2022-08-30 ENCOUNTER — HOSPITAL ENCOUNTER (OUTPATIENT)
Dept: RADIOLOGY | Facility: HOSPITAL | Age: 67
Discharge: HOME | End: 2022-08-30
Attending: INTERNAL MEDICINE
Payer: MEDICARE

## 2022-08-30 ENCOUNTER — CLINICAL SUPPORT (OUTPATIENT)
Dept: CARDIOLOGY | Facility: HOSPITAL | Age: 67
End: 2022-08-30
Attending: INTERNAL MEDICINE
Payer: MEDICARE

## 2022-08-30 VITALS — WEIGHT: 170 LBS | BODY MASS INDEX: 23.8 KG/M2 | HEIGHT: 71 IN

## 2022-08-30 DIAGNOSIS — J96.11 CHRONIC HYPOXEMIC RESPIRATORY FAILURE: ICD-10-CM

## 2022-08-30 DIAGNOSIS — J84.10 PULMONARY FIBROSIS: ICD-10-CM

## 2022-08-30 LAB
AV INDEX (PROSTH): 0.53
AV MEAN GRADIENT: 5 MMHG
AV VALVE AREA: 1.73 CM2
BSA FOR ECHO PROCEDURE: 1.97 M2
CV ECHO LV RWT: 0.39 CM
DOP CALC AO VTI: 34.3 CM
DOP CALC LVOT AREA: 3.2 CM2
DOP CALC LVOT DIAMETER: 2.03 CM
DOP CALC LVOT PEAK VEL: 85.07 M/S
DOP CALC LVOT STROKE VOLUME: 59.33 CM3
DOP CALCLVOT PEAK VEL VTI: 18.34 CM
E WAVE DECELERATION TIME: 211.34 MSEC
E/A RATIO: 1.09
E/E' RATIO: 8.22 M/S
ECHO LV POSTERIOR WALL: 1.03 CM (ref 0.6–1.1)
EJECTION FRACTION: 55 %
FRACTIONAL SHORTENING: 33 % (ref 28–44)
INTERVENTRICULAR SEPTUM: 1.01 CM (ref 0.6–1.1)
LEFT INTERNAL DIMENSION IN SYSTOLE: 3.51 CM (ref 2.1–4)
LEFT VENTRICLE DIASTOLIC VOLUME INDEX: 72.09 ML/M2
LEFT VENTRICLE DIASTOLIC VOLUME: 142.02 ML
LEFT VENTRICLE MASS INDEX: 102 G/M2
LEFT VENTRICLE SYSTOLIC VOLUME INDEX: 22 ML/M2
LEFT VENTRICLE SYSTOLIC VOLUME: 43.3 ML
LEFT VENTRICULAR INTERNAL DIMENSION IN DIASTOLE: 5.22 CM (ref 3.5–6)
LEFT VENTRICULAR MASS: 200.63 G
LV LATERAL E/E' RATIO: 6.73 M/S
LV SEPTAL E/E' RATIO: 10.57 M/S
MV PEAK A VEL: 0.68 M/S
MV PEAK E VEL: 0.74 M/S
PISA TR MAX VEL: 3.6 M/S
RA PRESSURE: 3 MMHG
RIGHT VENTRICULAR END-DIASTOLIC DIMENSION: 3.28 CM
TDI LATERAL: 0.11 M/S
TDI SEPTAL: 0.07 M/S
TDI: 0.09 M/S
TR MAX PG: 52 MMHG
TRICUSPID ANNULAR PLANE SYSTOLIC EXCURSION: 1.6 CM
TV REST PULMONARY ARTERY PRESSURE: 55 MMHG

## 2022-08-30 PROCEDURE — 93306 TTE W/DOPPLER COMPLETE: CPT | Mod: 26,,, | Performed by: INTERNAL MEDICINE

## 2022-08-30 PROCEDURE — 71046 X-RAY EXAM CHEST 2 VIEWS: CPT | Mod: TC

## 2022-08-30 PROCEDURE — 93306 ECHO (CUPID ONLY): ICD-10-PCS | Mod: 26,,, | Performed by: INTERNAL MEDICINE

## 2022-08-30 PROCEDURE — 93306 TTE W/DOPPLER COMPLETE: CPT

## 2022-09-21 ENCOUNTER — HOSPITAL ENCOUNTER (OUTPATIENT)
Dept: PULMONOLOGY | Facility: HOSPITAL | Age: 67
Discharge: HOME | End: 2022-09-21
Attending: INTERNAL MEDICINE
Payer: MEDICARE

## 2022-09-21 ENCOUNTER — PATIENT OUTREACH (OUTPATIENT)
Dept: ADMINISTRATIVE | Facility: HOSPITAL | Age: 67
End: 2022-09-21
Payer: MEDICARE

## 2022-09-21 ENCOUNTER — HOSPITAL ENCOUNTER (OUTPATIENT)
Dept: RADIOLOGY | Facility: HOSPITAL | Age: 67
Discharge: HOME | End: 2022-09-21
Attending: INTERNAL MEDICINE
Payer: MEDICARE

## 2022-09-21 DIAGNOSIS — J84.10 PULMONARY FIBROSIS: ICD-10-CM

## 2022-09-21 LAB
BRPFT: NORMAL
DLCO ADJ PRE: 7.22 ML/(MIN*MMHG)
DLCO SINGLE BREATH LLN: 21.38
DLCO SINGLE BREATH PRE REF: 25.5 %
DLCO SINGLE BREATH REF: 28.31
DLCOC SBVA LLN: 2.75
DLCOC SBVA PRE REF: 43.7 %
DLCOC SBVA REF: 3.88
DLCOC SINGLE BREATH LLN: 21.38
DLCOC SINGLE BREATH PRE REF: 25.5 %
DLCOC SINGLE BREATH REF: 28.31
DLCOVA LLN: 2.75
DLCOVA PRE REF: 43.7 %
DLCOVA PRE: 1.69 ML/(MIN*MMHG*L)
DLCOVA REF: 3.88
DLVAADJ PRE: 1.69 ML/(MIN*MMHG*L)
ERVN2 LLN: -16448.89
ERVN2 PRE REF: 196.1 %
ERVN2 PRE: 2.18 L
ERVN2 REF: 1.11
FEF 25 75 CHG: -16.9 %
FEF 25 75 LLN: 1.13
FEF 25 75 POST REF: 98.1 %
FEF 25 75 PRE REF: 117.9 %
FEF 25 75 REF: 2.56
FET100 CHG: 13.5 %
FEV1 CHG: -0.3 %
FEV1 FVC CHG: -2.6 %
FEV1 FVC LLN: 63
FEV1 FVC POST REF: 103.9 %
FEV1 FVC PRE REF: 106.6 %
FEV1 FVC REF: 76
FEV1 LLN: 2.43
FEV1 POST REF: 83.1 %
FEV1 PRE REF: 83.3 %
FEV1 REF: 3.35
FRCN2 LLN: 2.75
FRCN2 PRE REF: 87.6 %
FRCN2 REF: 3.73
FVC CHG: 2.4 %
FVC LLN: 3.3
FVC POST REF: 79.7 %
FVC PRE REF: 77.8 %
FVC REF: 4.43
IVC PRE: 3.22 L
IVC SINGLE BREATH LLN: 3.3
IVC SINGLE BREATH PRE REF: 72.7 %
IVC SINGLE BREATH REF: 4.43
MVV LLN: 111
MVV PRE REF: 75.5 %
MVV REF: 131
PEF CHG: -9.9 %
PEF LLN: 6.38
PEF POST REF: 72 %
PEF PRE REF: 79.9 %
PEF REF: 8.75
POST FEF 25 75: 2.51 L/S
POST FET 100: 4.58 SEC
POST FEV1 FVC: 78.96 %
POST FEV1: 2.78 L
POST FVC: 3.53 L
POST PEF: 6.3 L/S
PRE DLCO: 7.22 ML/(MIN*MMHG)
PRE FEF 25 75: 3.02 L/S
PRE FET 100: 4.04 SEC
PRE FEV1 FVC: 81.06 %
PRE FEV1: 2.79 L
PRE FRC N2: 3.27 L
PRE FVC: 3.44 L
PRE MVV: 99 L/MIN
PRE PEF: 6.99 L/S
RVN2 LLN: 1.95
RVN2 PRE REF: 41.6 %
RVN2 PRE: 1.09 L
RVN2 REF: 2.62
RVN2TLCN2 LLN: 31.5
RVN2TLCN2 PRE REF: 58 %
RVN2TLCN2 PRE: 23.48 %
RVN2TLCN2 REF: 40.48
TLCN2 LLN: 6.15
TLCN2 PRE REF: 63.7 %
TLCN2 PRE: 4.65 L
TLCN2 REF: 7.3
VA PRE: 4.28 L
VA SINGLE BREATH LLN: 7.15
VA SINGLE BREATH PRE REF: 59.9 %
VA SINGLE BREATH REF: 7.15
VCMAXN2 LLN: 3.3
VCMAXN2 PRE REF: 80.4 %
VCMAXN2 PRE: 3.56 L
VCMAXN2 REF: 4.43

## 2022-09-21 PROCEDURE — 94729 DIFFUSING CAPACITY: CPT | Mod: 26,,, | Performed by: INTERNAL MEDICINE

## 2022-09-21 PROCEDURE — 94060 PR EVAL OF BRONCHOSPASM: ICD-10-PCS | Mod: 26,,, | Performed by: INTERNAL MEDICINE

## 2022-09-21 PROCEDURE — 71250 CT CHEST WITHOUT CONTRAST: ICD-10-PCS | Mod: 26,,, | Performed by: RADIOLOGY

## 2022-09-21 PROCEDURE — 94729 DIFFUSING CAPACITY: CPT

## 2022-09-21 PROCEDURE — 94727 GAS DIL/WSHOT DETER LNG VOL: CPT

## 2022-09-21 PROCEDURE — 94060 EVALUATION OF WHEEZING: CPT | Mod: 26,,, | Performed by: INTERNAL MEDICINE

## 2022-09-21 PROCEDURE — 71250 CT THORAX DX C-: CPT | Mod: TC

## 2022-09-21 PROCEDURE — 94727 PR PULM FUNCTION TEST BY GAS: ICD-10-PCS | Mod: 26,,, | Performed by: INTERNAL MEDICINE

## 2022-09-21 PROCEDURE — 71250 CT THORAX DX C-: CPT | Mod: 26,,, | Performed by: RADIOLOGY

## 2022-09-21 PROCEDURE — 94727 GAS DIL/WSHOT DETER LNG VOL: CPT | Mod: 26,,, | Performed by: INTERNAL MEDICINE

## 2022-09-21 PROCEDURE — 94060 EVALUATION OF WHEEZING: CPT

## 2022-09-21 PROCEDURE — 94729 PR C02/MEMBANE DIFFUSE CAPACITY: ICD-10-PCS | Mod: 26,,, | Performed by: INTERNAL MEDICINE

## 2022-09-21 NOTE — PROGRESS NOTES
Subjective:    Patient ID:  Srinivaas Oliver is a 66 y.o. male who presents for evaluation of   Chief Complaint   Patient presents with    [facility] Care    Pre-op Exam         HPI:    . S/P CABG x 3, 2007    2. History of smoking greater than 50 pack years, quit 5/2018, close to 100 pack-year    3. Obesity (BMI 30.0-34.9), today 30.7    4. Abdominal obesity    5. Essential hypertension    6. PAD (peripheral artery disease)    7. Pure hypercholesterolemia    8. Rash    9. Coronary artery disease involving native coronary artery without angina pectoris, unspecified whether native or transplanted heart    10. S/P coronary artery stent placement    11. Hypercholesterolemia    12. Reactive depression       Plan:       Srinivasa was seen today for [facility] care.     Diagnoses and all orders for this visit:     S/P CABG x 3, 2007  -     NM Myocardial Perfusion Spect Multi Pharmacologic; Future  -     NM Multi Study RX Stress Card Component (BR); Future  -     Stress test (Cupid Only); Future  -     Transthoracic echo (TTE) complete (Cupid Only); Future  -     Lipid panel; Future  -     High sensitivity CRP (Cardiac CRP); Future  -     rosuvastatin (CRESTOR) 20 MG tablet; Take 1 tablet (20 mg total) by mouth once daily.     History of smoking greater than 50 pack years, quit 5/2018, close to 100 pack-year     Obesity (BMI 30.0-34.9), today 30.7     Abdominal obesity     Essential hypertension  -     Transthoracic echo (TTE) complete (Cupid Only); Future  -     metoprolol succinate (TOPROL-XL) 50 MG 24 hr tablet; Take 1 tablet (50 mg total) by mouth once daily.  -     clopidogrel (PLAVIX) 75 mg tablet; Take 1 tablet (75 mg total) by mouth once daily.     PAD (peripheral artery disease)  -     NM Myocardial Perfusion Spect Multi Pharmacologic; Future  -     NM Multi Study RX Stress Card Component (BR); Future  -     Stress test (Cupid Only); Future  -     Lipid panel; Future  -     High sensitivity CRP (Cardiac CRP);  Future  -     rosuvastatin (CRESTOR) 20 MG tablet; Take 1 tablet (20 mg total) by mouth once daily.  -     clopidogrel (PLAVIX) 75 mg tablet; Take 1 tablet (75 mg total) by mouth once daily.     Pure hypercholesterolemia  -     Lipid panel; Future  -     rosuvastatin (CRESTOR) 20 MG tablet; Take 1 tablet (20 mg total) by mouth once daily.     Rash     Coronary artery disease involving native coronary artery without angina pectoris, unspecified whether native or transplanted heart  -     rosuvastatin (CRESTOR) 20 MG tablet; Take 1 tablet (20 mg total) by mouth once daily.  -     clopidogrel (PLAVIX) 75 mg tablet; Take 1 tablet (75 mg total) by mouth once daily.     S/P coronary artery stent placement  -     rosuvastatin (CRESTOR) 20 MG tablet; Take 1 tablet (20 mg total) by mouth once daily.  -     clopidogrel (PLAVIX) 75 mg tablet; Take 1 tablet (75 mg total) by mouth once daily.     Hypercholesterolemia  -     clopidogrel (PLAVIX) 75 mg tablet; Take 1 tablet (75 mg total) by mouth once daily.     Reactive depression  -     escitalopram oxalate (LEXAPRO) 10 MG tablet; Take 1 tablet (10 mg total) by mouth once daily.  2019    FINDINGS:  The quality of the study is good.     Stress SPECT images demonstrate homogenous distribution of the tracer throughout the left ventricle. On the resting images, there is matched homogenous distribution of the tracer throughout the left ventricle.     The post-stress images demonstrate an estimated LVEF of 56 %. The LV cavity is not dilated with an end-diastolic volume of 63 ml and an end-systolic volume of 28 ml.     IMPRESSION:      1.  Scintigraphically negative for ischemia or infarct.  2. The global left ventricular systolic function is preserved with an LV ejection fraction of 56 % and no evidence of LV dilatation.        Electronically signed by: Toño Silva MD  Date:                                            01/15/2019    Summary    The estimated PA systolic pressure is  55 mmHg.  There is moderate pulmonary hypertension.  The left ventricle is normal in size with normal systolic function.  The estimated ejection fraction is 55%.  Normal left ventricular diastolic function.  Mild to moderate tricuspid regurgitation.  Mild right ventricular enlargement with mildly reduced right ventricular systolic function.        9/22/22    He has a history of coronary bypass x3 2007 by Dr. Langford.  He had a stent since that time is on aspirin Plavix.  He has not had a heart attack.  He previously followed with Dr. Mcneill.  He quit smoking for while but is back to smoking a pack of cigarettes a day.  He has hyperlipidemia hypertension varicose veins bilateral hearing loss and pulmonary fibrosis.  He has is closure to asbestos and needs a biopsy for suspicious pulmonary nodules.  He is to stop the aspirin Plavix prior to the proposed procedure.  He did already have 1 bronchoscopy with a could get to the desired lesion and is planned to have another 1.    EKG today shows normal sinus rhythm with sinus arrhythmia.    Denies any chest pain shortness of breath heart palpitations or edema  Review of patient's allergies indicates:   Allergen Reactions    Atorvastatin      Other reaction(s): Rash       Past Medical History:   Diagnosis Date    Adenomatous colon polyp 01/28/2011    Adenomatous colon polyp     Anxiety     Blood transfusion     CHF (congestive heart failure)     Emphysema of lung     Hypertension     Renal disorder     kidney stones    S/P coronary artery stent placement 01/07/2013    Staph skin infection     abd/ lanced x 2    Ulcer      Past Surgical History:   Procedure Laterality Date    COLONOSCOPY  01/28/2011    Dr Choudhary, tubular adenoma    CORONARY ARTERY BYPASS GRAFT  2005    CORONARY STENT PLACEMENT      ENDOBRONCHIAL ULTRASOUND N/A 7/18/2022    Procedure: ENDOBRONCHIAL ULTRASOUND (EBUS);  Surgeon: Alli Sandra MD;  Location: [facility];  Service: Pulmonary;  Laterality: N/A;     NAVIGATIONAL BRONCHOSCOPY Bilateral 2022    Procedure: BRONCHOSCOPY, NAVIGATIONAL;  Surgeon: Alli Sandra MD;  Location: [facility];  Service: Pulmonary;  Laterality: Bilateral;     Social History     Tobacco Use    Smoking status: Every Day     Packs/day: 0.75     Years: 42.00     Pack years: 31.50     Types: Cigarettes     Last attempt to quit: 2018     Years since quittin.3    Smokeless tobacco: Never    Tobacco comments:     patient room 7    Substance Use Topics    Alcohol use: No     Alcohol/week: 0.0 standard drinks    Drug use: No     Family History   Problem Relation Age of Onset    Heart disease Mother     Heart disease Father     Diabetes Father     Heart disease Maternal Aunt     No Known Problems Sister     No Known Problems Brother     No Known Problems Daughter     No Known Problems Son         Review of Systems:   Constitution: Negative for diaphoresis and fever.   HEENT: Negative for nosebleeds.    Cardiovascular: Negative for chest painCOUGH       No dyspnea on exertion       No leg swelling        No palpitations  Respiratory: Negative for shortness of breath and wheezing.    Hematologic/Lymphatic: Negative for bleeding problem. Does not bruise/bleed easily.   Skin: Negative for color change and rash.   Musculoskeletal: Negative for falls and myalgias.   Gastrointestinal: Negative for hematemesis and hematochezia.   Genitourinary: Negative for hematuria.   Neurological: Negative for dizziness and light-headedness.   Psychiatric/Behavioral: Negative for altered mental status and memory loss.          Objective:        Vitals:    22 1121   BP: 105/68   Pulse: (!) 57       Lab Results   Component Value Date    WBC 10.32 2022    HGB 15.5 2022    HCT 46.6 2022     2022    CHOL 110 (L) 2022    TRIG 108 2022    HDL 33 (L) 2022    ALT <5 (L) 2022    AST 12 2022     2022    K 4.1 2022     2022     CREATININE 0.74 07/18/2022    BUN 13 07/18/2022    CO2 30 07/18/2022    TSH 1.574 02/27/2019    PSA 1.3 06/16/2022    INR 0.9 07/18/2022        ECHOCARDIOGRAM RESULTS  Results for orders placed in visit on 08/30/22    Echo    Interpretation Summary  · The estimated PA systolic pressure is 55 mmHg.  · There is moderate pulmonary hypertension.  · The left ventricle is normal in size with normal systolic function.  · The estimated ejection fraction is 55%.  · Normal left ventricular diastolic function.  · Mild to moderate tricuspid regurgitation.  · Mild right ventricular enlargement with mildly reduced right ventricular systolic function.        CURRENT/PREVIOUS VISIT EKG  Results for orders placed or performed in visit on 03/30/21   IN OFFICE EKG 12-LEAD (to [place])    Collection Time: 03/30/21  2:09 PM    Narrative    Test Reason : I48.91,    Vent. Rate : 083 BPM     Atrial Rate : 083 BPM     P-R Int : 152 ms          QRS Dur : 086 ms      QT Int : 368 ms       P-R-T Axes : 002 035 031 degrees     QTc Int : 432 ms    Sinus rhythm with Premature atrial complexes  Otherwise normal ECG  When compared with ECG of 02-APR-2018 13:37,  Premature atrial complexes are now Present  Confirmed by Harvinder Mcneill MD ([id number]) on 3/31/2021 4:37:24 PM    Referred By: SEBASTIÁN RENEE           Confirmed By:Harvinder Mcneill MD     No valid procedures specified.   Results for orders placed during the hospital encounter of 01/15/19    Stress test (Cupid Only)    Interpretation Summary  · There were no arrhythmias during stress.  · The patient reported no symptoms during the stress test.  · The EKG portion of this study is abnormal but not diagnostic.    Myocardial Perfusion Study interpreted by Radiologist      Physical Exam:  CONSTITUTIONAL: No fever, no chills  HEENT: Normocephalic, atraumatic,pupils reactive to light                 NECK:  No JVD no carotid bruit  CVS: S1S2+, RRR, no murmurs,   LUNGS: DIFFUSE MILD EXP WHEEZE  ABDOMEN: Soft, NT,  BS+  EXTREMITIES: No cyanosis, edema  : No roa catheter  NEURO: AAO X 3  PSY: Normal affect      Medication List with Changes/Refills   Current Medications    ALBUTEROL (PROVENTIL HFA) 90 MCG/ACTUATION INHALER    Inhale 2 puffs into the lungs every 4 (four) hours as needed for Wheezing. Every 4-6 hours PRN    ASCORBIC ACID, VITAMIN C, (VITAMIN C) 1000 MG TABLET    Take 1,000 mg by mouth once daily.    ASPIRIN (ECOTRIN) 81 MG EC TABLET    Take 81 mg by mouth once daily.    CLOPIDOGREL (PLAVIX) 75 MG TABLET    Take 1 tablet (75 mg total) by mouth once daily. Need office visit before next refill, last seen 1/2019. This will be the LAST Rx if visit is not made.    COENZYME Q10-VITAMIN E 100-100 MG-UNIT CAP    Take 200 mg by mouth once daily. Every day    DOCOSAHEXANOIC ACID/EPA (FISH OIL ORAL)    Take 1 capsule by mouth 2 (two) times daily as needed. Twice a day    ESCITALOPRAM OXALATE (LEXAPRO) 10 MG TABLET    Take 1 tablet (10 mg total) by mouth once daily.    FERROUS SULFATE (FEOSOL) 325 MG (65 MG IRON) TAB TABLET    Take 325 mg by mouth daily with breakfast.    IPRATROPIUM-ALBUTEROL (COMBIVENT RESPIMAT)  MCG/ACTUATION INHALER    Inhale 1 puff into the lungs every 6 (six) hours as needed for Wheezing. Rescue    LEVOFLOXACIN (LEVAQUIN) 750 MG TABLET    Take 1 tablet (750 mg total) by mouth once daily.    METOPROLOL SUCCINATE (TOPROL-XL) 50 MG 24 HR TABLET    Take 1 tablet (50 mg total) by mouth every evening.    ROSUVASTATIN (CRESTOR) 20 MG TABLET    Take 1 tablet (20 mg total) by mouth once daily.    TRAZODONE (DESYREL) 50 MG TABLET    Take 1 tablet (50 mg total) by mouth every evening.             Assessment:       1. Encounter to establish care    2. Coronary artery disease involving native coronary artery of native heart without angina pectoris    3. Pre-operative clearance    4. S/P CABG x 3, 2007    5. Primary hypertension    6. Obesity (BMI 30.0-34.9), today 30.7    7. Tobacco dependence    8. Mixed  hyperlipidemia    9. Abdominal obesity    10. Abnormal PET scan of lung    11. Pulmonary fibrosis    12. Lung nodule    13. Paroxysmal atrial fibrillation         Plan:     Problem List Items Addressed This Visit          Pulmonary    Abnormal PET scan of lung    Pulmonary fibrosis    Relevant Orders    IN OFFICE EKG 12-LEAD (to [place])    Lung nodule       Cardiac/Vascular    CAD (coronary artery disease)    Relevant Orders    IN OFFICE EKG 12-LEAD (to [facility])    HTN (hypertension)    Hyperlipidemia, baseline     S/P CABG x 3, 2007    Relevant Orders    IN OFFICE EKG 12-LEAD (to [facility])    Paroxysmal atrial fibrillation       Endocrine    Obesity (BMI 30.0-34.9), today 30.7    Abdominal obesity       Other    Tobacco dependence     Other Visit Diagnoses       Encounter to establish care    -  Primary    Pre-operative clearance                Follow up in about 3 months (around 12/22/2022).    The patients questions were answered, they verbalized understanding, and agreed with the treatment plan.     KYLAH MARSHALL MD  [facility]

## 2022-09-22 ENCOUNTER — OFFICE VISIT (OUTPATIENT)
Dept: CARDIOLOGY | Facility: CLINIC | Age: 67
End: 2022-09-22
Payer: MEDICARE

## 2022-09-22 ENCOUNTER — OFFICE VISIT (OUTPATIENT)
Dept: PULMONOLOGY | Facility: CLINIC | Age: 67
End: 2022-09-22
Payer: MEDICARE

## 2022-09-22 VITALS
DIASTOLIC BLOOD PRESSURE: 68 MMHG | OXYGEN SATURATION: 91 % | WEIGHT: 169.56 LBS | BODY MASS INDEX: 23.65 KG/M2 | HEART RATE: 57 BPM | SYSTOLIC BLOOD PRESSURE: 105 MMHG

## 2022-09-22 VITALS
SYSTOLIC BLOOD PRESSURE: 107 MMHG | OXYGEN SATURATION: 94 % | WEIGHT: 169.75 LBS | DIASTOLIC BLOOD PRESSURE: 58 MMHG | HEART RATE: 60 BPM | HEIGHT: 71 IN | BODY MASS INDEX: 23.77 KG/M2

## 2022-09-22 DIAGNOSIS — J84.10 PULMONARY FIBROSIS: Primary | ICD-10-CM

## 2022-09-22 DIAGNOSIS — Z76.89 ENCOUNTER TO ESTABLISH CARE: Primary | ICD-10-CM

## 2022-09-22 DIAGNOSIS — Z01.818 PRE-OPERATIVE CLEARANCE: ICD-10-CM

## 2022-09-22 DIAGNOSIS — J84.10 PULMONARY FIBROSIS: ICD-10-CM

## 2022-09-22 DIAGNOSIS — Z95.1 S/P CABG X 3: ICD-10-CM

## 2022-09-22 DIAGNOSIS — R91.1 LUNG NODULE: ICD-10-CM

## 2022-09-22 DIAGNOSIS — I10 PRIMARY HYPERTENSION: ICD-10-CM

## 2022-09-22 DIAGNOSIS — E66.9 OBESITY (BMI 30.0-34.9): ICD-10-CM

## 2022-09-22 DIAGNOSIS — I25.10 CORONARY ARTERY DISEASE INVOLVING NATIVE CORONARY ARTERY OF NATIVE HEART WITHOUT ANGINA PECTORIS: ICD-10-CM

## 2022-09-22 DIAGNOSIS — E65 ABDOMINAL OBESITY: ICD-10-CM

## 2022-09-22 DIAGNOSIS — F17.200 TOBACCO DEPENDENCE: ICD-10-CM

## 2022-09-22 DIAGNOSIS — R94.2 ABNORMAL PET SCAN OF LUNG: ICD-10-CM

## 2022-09-22 DIAGNOSIS — I48.0 PAROXYSMAL ATRIAL FIBRILLATION: ICD-10-CM

## 2022-09-22 DIAGNOSIS — E78.2 MIXED HYPERLIPIDEMIA: ICD-10-CM

## 2022-09-22 DIAGNOSIS — J47.9 BRONCHIECTASIS WITHOUT COMPLICATION: ICD-10-CM

## 2022-09-22 PROCEDURE — 99999 PR PBB SHADOW E&M-EST. PATIENT-LVL III: ICD-10-PCS | Mod: PBBFAC,,, | Performed by: INTERNAL MEDICINE

## 2022-09-22 PROCEDURE — 99499 UNLISTED E&M SERVICE: CPT | Mod: S$GLB,,, | Performed by: GENERAL PRACTICE

## 2022-09-22 PROCEDURE — 1101F PT FALLS ASSESS-DOCD LE1/YR: CPT | Mod: CPTII,S$GLB,, | Performed by: GENERAL PRACTICE

## 2022-09-22 PROCEDURE — 1159F MED LIST DOCD IN RCRD: CPT | Mod: CPTII,S$GLB,, | Performed by: INTERNAL MEDICINE

## 2022-09-22 PROCEDURE — 99499 RISK ADDL DX/OHS AUDIT: ICD-10-PCS | Mod: S$GLB,,, | Performed by: INTERNAL MEDICINE

## 2022-09-22 PROCEDURE — 3008F PR BODY MASS INDEX (BMI) DOCUMENTED: ICD-10-PCS | Mod: CPTII,S$GLB,, | Performed by: GENERAL PRACTICE

## 2022-09-22 PROCEDURE — 3078F PR MOST RECENT DIASTOLIC BLOOD PRESSURE < 80 MM HG: ICD-10-PCS | Mod: CPTII,S$GLB,, | Performed by: GENERAL PRACTICE

## 2022-09-22 PROCEDURE — 3008F BODY MASS INDEX DOCD: CPT | Mod: CPTII,S$GLB,, | Performed by: GENERAL PRACTICE

## 2022-09-22 PROCEDURE — 99499 UNLISTED E&M SERVICE: CPT | Mod: S$GLB,,, | Performed by: INTERNAL MEDICINE

## 2022-09-22 PROCEDURE — 3288F FALL RISK ASSESSMENT DOCD: CPT | Mod: CPTII,S$GLB,, | Performed by: GENERAL PRACTICE

## 2022-09-22 PROCEDURE — 3074F PR MOST RECENT SYSTOLIC BLOOD PRESSURE < 130 MM HG: ICD-10-PCS | Mod: CPTII,S$GLB,, | Performed by: GENERAL PRACTICE

## 2022-09-22 PROCEDURE — 99214 OFFICE O/P EST MOD 30 MIN: CPT | Mod: S$GLB,,, | Performed by: INTERNAL MEDICINE

## 2022-09-22 PROCEDURE — 3078F DIAST BP <80 MM HG: CPT | Mod: CPTII,S$GLB,, | Performed by: INTERNAL MEDICINE

## 2022-09-22 PROCEDURE — 3074F SYST BP LT 130 MM HG: CPT | Mod: CPTII,S$GLB,, | Performed by: GENERAL PRACTICE

## 2022-09-22 PROCEDURE — 99999 PR PBB SHADOW E&M-EST. PATIENT-LVL III: CPT | Mod: PBBFAC,,, | Performed by: INTERNAL MEDICINE

## 2022-09-22 PROCEDURE — 93000 ELECTROCARDIOGRAM COMPLETE: CPT | Mod: S$GLB,,, | Performed by: GENERAL PRACTICE

## 2022-09-22 PROCEDURE — 3288F PR FALLS RISK ASSESSMENT DOCUMENTED: ICD-10-PCS | Mod: CPTII,S$GLB,, | Performed by: GENERAL PRACTICE

## 2022-09-22 PROCEDURE — 99499 RISK ADDL DX/OHS AUDIT: ICD-10-PCS | Mod: S$GLB,,, | Performed by: GENERAL PRACTICE

## 2022-09-22 PROCEDURE — 99204 PR OFFICE/OUTPT VISIT, NEW, LEVL IV, 45-59 MIN: ICD-10-PCS | Mod: S$GLB,,, | Performed by: GENERAL PRACTICE

## 2022-09-22 PROCEDURE — 3078F PR MOST RECENT DIASTOLIC BLOOD PRESSURE < 80 MM HG: ICD-10-PCS | Mod: CPTII,S$GLB,, | Performed by: INTERNAL MEDICINE

## 2022-09-22 PROCEDURE — 93000 EKG 12-LEAD: ICD-10-PCS | Mod: S$GLB,,, | Performed by: GENERAL PRACTICE

## 2022-09-22 PROCEDURE — 3078F DIAST BP <80 MM HG: CPT | Mod: CPTII,S$GLB,, | Performed by: GENERAL PRACTICE

## 2022-09-22 PROCEDURE — 3008F BODY MASS INDEX DOCD: CPT | Mod: CPTII,S$GLB,, | Performed by: INTERNAL MEDICINE

## 2022-09-22 PROCEDURE — 3074F PR MOST RECENT SYSTOLIC BLOOD PRESSURE < 130 MM HG: ICD-10-PCS | Mod: CPTII,S$GLB,, | Performed by: INTERNAL MEDICINE

## 2022-09-22 PROCEDURE — 1101F PR PT FALLS ASSESS DOC 0-1 FALLS W/OUT INJ PAST YR: ICD-10-PCS | Mod: CPTII,S$GLB,, | Performed by: GENERAL PRACTICE

## 2022-09-22 PROCEDURE — 3008F PR BODY MASS INDEX (BMI) DOCUMENTED: ICD-10-PCS | Mod: CPTII,S$GLB,, | Performed by: INTERNAL MEDICINE

## 2022-09-22 PROCEDURE — 99204 OFFICE O/P NEW MOD 45 MIN: CPT | Mod: S$GLB,,, | Performed by: GENERAL PRACTICE

## 2022-09-22 PROCEDURE — 1159F PR MEDICATION LIST DOCUMENTED IN MEDICAL RECORD: ICD-10-PCS | Mod: CPTII,S$GLB,, | Performed by: INTERNAL MEDICINE

## 2022-09-22 PROCEDURE — 99214 PR OFFICE/OUTPT VISIT, EST, LEVL IV, 30-39 MIN: ICD-10-PCS | Mod: S$GLB,,, | Performed by: INTERNAL MEDICINE

## 2022-09-22 PROCEDURE — 3074F SYST BP LT 130 MM HG: CPT | Mod: CPTII,S$GLB,, | Performed by: INTERNAL MEDICINE

## 2022-09-22 RX ORDER — PIRFENIDONE 267 MG/1
CAPSULE ORAL
Qty: 207 CAPSULE | Refills: 0 | Status: SHIPPED | OUTPATIENT
Start: 2022-09-22 | End: 2022-09-28

## 2022-09-22 RX ORDER — ALBUTEROL SULFATE 0.83 MG/ML
2.5 SOLUTION RESPIRATORY (INHALATION) EVERY 6 HOURS PRN
Qty: 120 ML | Refills: 11 | Status: SHIPPED | OUTPATIENT
Start: 2022-09-22 | End: 2023-10-16 | Stop reason: SDUPTHER

## 2022-09-22 RX ORDER — PIRFENIDONE 801 MG/1
1 TABLET, FILM COATED ORAL 3 TIMES DAILY
Qty: 90 TABLET | Refills: 11 | Status: SHIPPED | OUTPATIENT
Start: 2022-09-22 | End: 2022-09-28

## 2022-09-22 RX ORDER — NICOTINE 7MG/24HR
1 PATCH, TRANSDERMAL 24 HOURS TRANSDERMAL DAILY
Qty: 30 PATCH | Refills: 2 | Status: SHIPPED | OUTPATIENT
Start: 2022-09-22 | End: 2022-10-13 | Stop reason: SDUPTHER

## 2022-09-22 NOTE — PATIENT INSTRUCTIONS
Airway clearance regimen to help with thick mucous, coughing:   Nebulizer with albuterol twice daily   Use flutter valve twice daily with treatments   Postural drainage at bedtime (handout given) to help drain lower lobes    Pirfenadone (Esbriet) ordered start 1 pill 3 times daily first week, then 2 pills 3 times daily second week, then 3 pills 3 times daily for full dose  Blood testing once monthly to watch liver tests for next 3 months  Quit smoking recommended- nicotine patch ordered  Smoking cessation program ordered

## 2022-09-22 NOTE — LETTER
2022    Srinivasa Oliver  [address] [place] [address] [address] [place] [id number]             [facility] - Cardiology  [address] [facility]  [place] [address]  Phone: [phone]  Fax: [phone] Patient: Srinivasa Oliver  : 1955  Referring Doctor: Aaron Gutiérrez MD  Telephone:[phone]  Consulting Doctor: Alli Sandra MD    Current Outpatient Medications   Medication Sig    albuterol (PROVENTIL HFA) 90 mcg/actuation inhaler Inhale 2 puffs into the lungs every 4 (four) hours as needed for Wheezing. Every 4-6 hours PRN    ascorbic acid, vitamin C, (VITAMIN C) 1000 MG tablet Take 1,000 mg by mouth once daily.    aspirin (ECOTRIN) 81 MG EC tablet Take 81 mg by mouth once daily.    clopidogreL (PLAVIX) 75 mg tablet Take 1 tablet (75 mg total) by mouth once daily. Need office visit before next refill, last seen 2019. This will be the LAST Rx if visit is not made.    coenzyme Q10-vitamin E 100-100 mg-unit Cap Take 200 mg by mouth once daily. Every day    DOCOSAHEXANOIC ACID/EPA (FISH OIL ORAL) Take 1 capsule by mouth 2 (two) times daily as needed. Twice a day    EScitalopram oxalate (LEXAPRO) 10 MG tablet Take 1 tablet (10 mg total) by mouth once daily. (Patient taking differently: Take 10 mg by mouth every evening.)    ferrous sulfate (FEOSOL) 325 mg (65 mg iron) Tab tablet Take 325 mg by mouth daily with breakfast.    ipratropium-albuteroL (COMBIVENT RESPIMAT)  mcg/actuation inhaler Inhale 1 puff into the lungs every 6 (six) hours as needed for Wheezing. Rescue    metoprolol succinate (TOPROL-XL) 50 MG 24 hr tablet Take 1 tablet (50 mg total) by mouth every evening. (Patient taking differently: Take 50 mg by mouth once daily.)    rosuvastatin (CRESTOR) 20 MG tablet Take 1 tablet (20 mg total) by mouth once daily. (Patient taking differently: Take 20 mg by mouth every evening.)    levoFLOXacin (LEVAQUIN) 750 MG tablet Take 1 tablet (750 mg total) by mouth once daily. (Patient not taking:  Reported on 9/22/2022)    traZODone (DESYREL) 50 MG tablet Take 1 tablet (50 mg total) by mouth every evening. (Patient not taking: Reported on 9/22/2022)     No current facility-administered medications for this visit.       This patient has been assessed for risk factors for clearance of surgery with the following stipulations:Moderate Risk    ___ No contraindications  __x_ Recommendations for antiplatelet/anticoagulant medications:Hold Aspirin for ___5-7_ days and Plavix for 5-__7____ days prior to procedure.  ___ Cleared for surgery with the following contraindications/precautions:  ___ Not cleared for surgery due to the following reasons:      If you have any questions regarding the above, please contact my office at [phone]    Sincerely,       Aaron Gutiérrez MD

## 2022-09-22 NOTE — PROGRESS NOTES
9/22/2022    Srinivasa Oliver  Follow up    No chief complaint on file.      HPI:   09/22/2022- pt has stable sob, cough w/ mucous. He and wife both think cough/mucous improved with antibiotic. He says phlegm is usually white with once/wk bloody streak.  Sometimes he notices phlegm comes up when lies in a certain position  Pt falls asleep easily, does not snore.  He still smokes 7-8 cigarettes daily    07/21/2022-   Lung scarring - pulmonary fibrosis- is very severe end stage-- suspect due to occupational dust exposure   Treat pneumonia first then will consider med called pirfenadone in future  Continue oxygen, need to wear any time you are being active to keep sats >90%  Take levaquin 7 days for suspected pneumonia in the left upper lobe  Get chest x-ray after completing antibiotics  Repeat CT scan in 2 mos to ensure pneumonia is improving  Repeat PFT 2 months   Treat pneumonia first then will consider antifibrotic med called pirfenadone (Esbriet) in future    Pt is a 67 yo male with CAD s/p CABG, atrial fib, pulmonary fibrosis, 2.8 cm PET avid lung nodule who is new to me and recently established care at our office. Case discussed w/ NP and imaging reviewed. He was sent for navigational bronch, ebus (7/12 per Dr. Sandra) with bx of LEIGHTON nodule and LN biopsies- path negative for malignancy  Fri pt had SOB and heart palpitations  Pt just found out about pulmonary fibrosis however findings present on imaging since 2008  He was just started on home O2, has POC  He smokes 1 ppd- since age 18. He was able to quit using smoking cessation classes in past but started again due to stress w/ wife's health. He has tried chantix twice w/ no success. Also vapes nicotine about 6 months now.  He gets short of breath mowing the lawn.  Coughs w white phlegm, phlegm from nose  Wife assists w/ history. Pt is a poor historian.  Pt was a , , worked in [employer], retired 2013 and had worked 26 yrs at [employer]. 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  Family hx- sister  of lung cancer. No family hx of pulm fibrosis.    2022-   Scheduling lung strength test, this must be done  Will wait on PET scan results. Expect to have tissue biopsy at hospital.   screening Ct by PCP showed diffuse emphysema and multiple nodules. Scheduled for PET scan.   Compliant of SOB onset years, worse with exertion, improves with rest. On symbicort and albuterol rescue with benefit.   Cough- onset 6 months, worse in mornings, productive clear mucous. Associated with wheeze. Has nocturnal coughing fits.     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, Smoking Hx; currently smoking 1 pack daily, 43 pack years  Family Hx: no Lung Cancer, no COPD, no Asthma  Medical Hx: no previous pneumonia ; no previous shoulder surgery, CABG surgery       The chief compliant  problem is stable  PFSH:  Past Medical History:   Diagnosis Date    Adenomatous colon polyp 2011    Adenomatous colon polyp     Anxiety     Blood transfusion     CHF (congestive heart failure)     Emphysema of lung     Hypertension     Renal disorder     kidney stones    S/P coronary artery stent placement 2013    Staph skin infection     abd/ lanced x 2    Ulcer          Past Surgical History:   Procedure Laterality Date    COLONOSCOPY  2011    Dr Choudhary, tubular adenoma    CORONARY ARTERY BYPASS GRAFT      CORONARY STENT PLACEMENT      ENDOBRONCHIAL ULTRASOUND N/A 2022    Procedure: ENDOBRONCHIAL ULTRASOUND (EBUS);  Surgeon: Alli Sandra MD;  Location: [facility];  Service: Pulmonary;  Laterality: N/A;    NAVIGATIONAL BRONCHOSCOPY Bilateral 2022    Procedure: BRONCHOSCOPY, NAVIGATIONAL;  Surgeon: Alli Sandra MD;  Location: [facility];  Service: Pulmonary;  Laterality: Bilateral;     Social History     Tobacco Use    Smoking  "status: Every Day     Packs/day: 0.75     Years: 42.00     Pack years: 31.50     Types: Cigarettes     Last attempt to quit: 2018     Years since quittin.3    Smokeless tobacco: Never    Tobacco comments:     patient room 7    Substance Use Topics    Alcohol use: No     Alcohol/week: 0.0 standard drinks    Drug use: No     Family History   Problem Relation Age of Onset    Heart disease Mother     Heart disease Father     Diabetes Father     Heart disease Maternal Aunt     No Known Problems Sister     No Known Problems Brother     No Known Problems Daughter     No Known Problems Son      Review of patient's allergies indicates:   Allergen Reactions    Atorvastatin      Other reaction(s): Rash     I have reviewed past medical, family, and social history. I have reviewed previous nurse notes.    Performance Status:The patient's activity level is functions out of house.      Review of Systems:  a review of eleven systems covering constitutional, Eye, HEENT, Psych, Respiratory, Cardiac, GI, , Musculoskeletal, Endocrine, Dermatologic was negative except for pertinent findings as listed ABOVE and below:  Diarrhea- chronic     Exam:Comprehensive exam done. BP (!) 107/58   Pulse 60   Ht 5' 11" (1.803 m)   Wt 77 kg (169 lb 12.1 oz)   SpO2 (!) 94%   BMI 23.68 kg/m²   Exam included Vitals as listed, and patient's appearance and affect and alertness and mood, oral exam for yeast and hygiene and pharynx lesions and Mallapatti (M) score, neck with inspection for jvd and masses and thyroid abnormalities and lymph nodes (supraclavicular and infraclavicular nodes and axillary also examined and noted if abn), chest exam included symmetry and effort and fremitus and percussion and auscultation, cardiac exam included rhythm and gallops and murmur and rubs and jvd and edema, abdominal exam for mass and hepatosplenomegaly and tenderness and hernias and bowel sounds, Musculoskeletal exam with muscle tone and posture and " mobility/gait and  strength, and skin for rashes and cyanosis and pallor and turgor, extremity for clubbing.  Findings were normal except for pertinent findings listed below:  M4, edentulous  HR irregularly irreg  Breath sounds with scattered rales and crackles bilaterally  Significant clubbing bilat nails  No edema    Radiographs (ct chest and cxr) reviewed: view by direct vision  CT chest 9/21/22- LEIGHTON consolidation stable past 3 scans and appears somewhat improved compared to 6/2022 scan. Fibrosis overall stable  CT chest 8/22/22- stable    PET CT 7/7/22-  LEIGHTON PET avid nodular consolidation appears similar size to previous, also with PET avid mediastinal adenopathy  1. Multiple contiguous hypermetabolic pleural based pulmonary opacities in the left upper lobe of the lung, nonspecific. Given appearance and morphology, these could be of infectious or inflammatory etiology. Consider short-term chest CT follow-up in 3 months following antibiotic therapy. Alternatively, these could be biopsied for tissue diagnosis however this patient is a high risk for pneumothorax with CT-guided lung biopsy, given underlying parenchymal lung disease and emphysema.  2. No additional findings of FDG avid primary neoplasm or metastatic disease.  3. Several prominent to mildly enlarged mediastinal lymph nodes which are very mildly hypermetabolic, and stable compared to multiple prior CT exams, most likely incidental and or reactive.  4. Additional observations as described.    CT Chest Lung Screening Low Dose  06/20/2022 - there is very severe pulmonary fibrosis features include peripheral predominant, even distribution in the upper and lower lobes w/ honeycombing, bronchiectasis and reticulations.  LEIGHTON pleural based nodular consolidation without distrete borders. Fibrosis is ?slightly progressed compared to prior CT 4/2021 but definitely progressive since 2020    CT chest 2009- lower lobe ground glass, milder ILD w/ reticulations  and peripheral honeycombing (beto upper lobes)       Transthoracic echo (TTE) complete 1/15/19   The estimated ejection fraction is 60%. Grade 1 diastolic dysfunction    Mild elevated PA systolic pressure, 43 mm Hg by calculation.       Labs reviewed        Latest Reference Range & Units 06/16/22 08:35   AST 10 - 40 U/L 12   ALT 10 - 44 U/L <5 (L)      Latest Reference Range & Units 06/16/22 08:35   Eos # 0.0 - 0.5 K/uL 0.1     PFT reviewed-   9/21/22- slightly improved restriction, stable dlco    6/24/22- restriction, severe reduction in dlco        6mwt 6/24/22- 255m, O2 reqt 3-4L    Plan:  Clinical impression is apparently straight forward and impression with management as below. Very advanced pulm fibrosis, suspect related to occupational dust exposure. Starting antifibrotic and bronchiectasis therapy    Diagnoses and all orders for this visit:    Pulmonary fibrosis  -     pirfenidone (ESBRIET) 267 mg Cap; Take 1 capsule (267 mg total) by mouth 3 (three) times daily for 7 days, THEN 2 capsules (534 mg total) 3 (three) times daily for 7 days, THEN 3 capsules (801 mg total) 3 (three) times daily for 16 days.  -     pirfenidone (ESBRIET) 801 mg Tab; Take 1 capsule by mouth 3 (three) times daily.  -     Comprehensive Metabolic Panel; Standing    Lung nodule    Tobacco dependence  -     nicotine (NICODERM CQ) 7 mg/24 hr; Place 1 patch onto the skin once daily.  -     Ambulatory referral/consult to Smoking Cessation Program; Future    Bronchiectasis without complication  -     NEBULIZER FOR HOME USE  -     albuterol (PROVENTIL) 2.5 mg /3 mL (0.083 %) nebulizer solution; Take 3 mLs (2.5 mg total) by nebulization every 6 (six) hours as needed for Wheezing. Rescue        Follow up in about 4 weeks (around 10/20/2022).    Discussed with patient above for education the following:      Patient Instructions   Airway clearance regimen to help with thick mucous, coughing:   Nebulizer with albuterol twice daily   Use flutter  valve twice daily with treatments   Postural drainage at bedtime (handout given) to help drain lower lobes    Pirfenadone (Esbriet) ordered start 1 pill 3 times daily first week, then 2 pills 3 times daily second week, then 3 pills 3 times daily for full dose  Blood testing once monthly to watch liver tests for next 3 months  Quit smoking recommended- nicotine patch ordered  Smoking cessation program ordered      Addendum-   From Dr. Sandra date 9/28/22-   I presented him on lung cancer conference yesterday. Radiologist thought there was a slight decrease in LEIGHTON lesion. XRT doc said that due to ILD, risk of pulmonary toxicity would be higher for empiric radiation and rather not do it. There is a new RLL nodule about 6 mm that needs follow up. In summary we recommend 3  - 4 month follow up CT chest      Addendum-   Per pharmacist Esbriet was denied so I will order Ofev for him.

## 2022-09-27 ENCOUNTER — SPECIALTY PHARMACY (OUTPATIENT)
Dept: PHARMACY | Facility: CLINIC | Age: 67
End: 2022-09-27

## 2022-09-27 ENCOUNTER — TUMOR BOARD CONFERENCE (OUTPATIENT)
Dept: HEMATOLOGY/ONCOLOGY | Facility: CLINIC | Age: 67
End: 2022-09-27
Payer: MEDICARE

## 2022-09-27 DIAGNOSIS — J84.10 PULMONARY FIBROSIS: Primary | ICD-10-CM

## 2022-09-27 NOTE — PROGRESS NOTES
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 Sandra will attempt bx with lung Vision  7/25/22: Bronch per Dr. Sandra LEIGHTON lesion  Began abx and sx have improved     Hx of interstitial fibrosis & emphysema with honeycombing    CT imaging with primary lesion getting smaller    BOARD RECOMMENDATIONS:   Reimage in 3 to 4 months as advance IPF    CONSULT NEEDED:     [] Surgery    [] Hem/Onc    [] Rad/Onc    [] Dietary                 [] Social Service    [] Psychology       [] Pulmonology           [x] [facility] Treatment Guidelines reviewed and care planned is consistent with guidelines.         (i.e., NCCN, NCI, PD, ACO, AUA, etc.)    PRESENTATION AT CANCER CONFERENCE:         [x] Prospective    [] Retrospective     [] Follow-Up          [] Eligible for clinical trial      Tristen De Guzman NP

## 2022-09-27 NOTE — TELEPHONE ENCOUNTER
Lillie, this is Herb Pemberton with [facility].  We are working on your prescription that your doctor has sent us. We will be working with your insurance to get this approved for you. We will be calling you along the way with updates on your medication.  If you have any questions, you can reach us at [phone].    Welcome call outcome: Left voicemail

## 2022-09-28 ENCOUNTER — TELEPHONE (OUTPATIENT)
Dept: PULMONOLOGY | Facility: CLINIC | Age: 67
End: 2022-09-28
Payer: MEDICARE

## 2022-09-28 ENCOUNTER — TELEPHONE (OUTPATIENT)
Dept: RADIATION ONCOLOGY | Facility: CLINIC | Age: 67
End: 2022-09-28
Payer: MEDICARE

## 2022-09-28 RX ORDER — NINTEDANIB 150 MG/1
150 CAPSULE ORAL 2 TIMES DAILY
Qty: 60 CAPSULE | Refills: 11 | Status: SHIPPED | OUTPATIENT
Start: 2022-09-28 | End: 2023-10-16

## 2022-09-28 NOTE — TELEPHONE ENCOUNTER
Left voicemail to contact office back.     ----- Message from Noreen Flowers MD sent at 9/28/2022  3:09 PM CDT -----  Regarding: FW: Radha HERNANDEZ denied  Please let pt know esbriet was denied so I am using the other antifibrotic ofev (alternative medicine to slow scarring):     Starting new medicine called Ofev (nintedanib) which is an antifibrotic to slow down scarring of the lung tissue. This medicine may come with the side effect of diarrhea which can be better with using imodium as needed. While on this medicine I would like to monitor your liver function tests every month for 3 mos then switch to every 3 month blood draws. Please go to the lab for blood work now prior to starting the new medicine.    ----- Message -----  From: Herb Pemberton PharmD  Sent: 9/28/2022  11:28 AM CDT  To: Noreen Flowers MD, Lionel Sorto Staff  Subject: Radha HERNANDEZ denied                                Hi Team and good morning:     I am writing to inform you that Srinivasa Oliver's Esbriet prior authorization due to pulmonary fibrosis with suspected cause of occupation dust exposure. It is unlikely that Esbriet will be approved upon appeal due to the limited indication of idiopathic pulmonary fibrosis specified in this medication. Ofev may have a better opportunity due to broader indications such as Systemic sclerosis-associated interstitial lung disease or Progressive pulmonary fibrosis. Please let us know what your next steps for plan will be so that we can address the encounter. Thank you in advance!    Herb Pemberton, Jameel  Clinical Pharmacist  [address] [place] [id number]  [phone]

## 2022-10-03 NOTE — TELEPHONE ENCOUNTER
PA approved. Authorization is good through 12/31/2022  Test Claim successful $2989.30  Sending to BI/FA  LDD medication

## 2022-10-03 NOTE — TELEPHONE ENCOUNTER
Benefit Investigation    [facility]  No LIS  Estimated copay: $2989.30   Pt is in initial phase  [facility] in Network    Forward to FA

## 2022-10-06 NOTE — TELEPHONE ENCOUNTER
Outgoing call to pt regarding Ofev approval and high copay, PAP available, need HH size and income for screening - LVM

## 2022-10-07 ENCOUNTER — PATIENT MESSAGE (OUTPATIENT)
Dept: PHARMACY | Facility: CLINIC | Age: 67
End: 2022-10-07
Payer: MEDICARE

## 2022-10-07 NOTE — TELEPHONE ENCOUNTER
Outgoing call to pt regarding Ofev approval and high copay, PAP available, need HH size and income for screening - LVM    Mychart message sent

## 2022-10-13 ENCOUNTER — CLINICAL SUPPORT (OUTPATIENT)
Dept: SMOKING CESSATION | Facility: CLINIC | Age: 67
End: 2022-10-13
Payer: COMMERCIAL

## 2022-10-13 DIAGNOSIS — F17.210 MODERATE CIGARETTE SMOKER (10-19 PER DAY): Primary | ICD-10-CM

## 2022-10-13 PROCEDURE — 99999 PR PBB SHADOW E&M-EST. PATIENT-LVL I: ICD-10-PCS | Mod: PBBFAC,,,

## 2022-10-13 PROCEDURE — 99404 PREV MED CNSL INDIV APPRX 60: CPT | Mod: S$GLB,,,

## 2022-10-13 PROCEDURE — 99999 PR PBB SHADOW E&M-EST. PATIENT-LVL I: CPT | Mod: PBBFAC,,,

## 2022-10-13 PROCEDURE — 99404 PR PREVENT COUNSEL,INDIV,60 MIN: ICD-10-PCS | Mod: S$GLB,,,

## 2022-10-13 RX ORDER — VARENICLINE TARTRATE 1 MG/1
TABLET, FILM COATED ORAL
Qty: 56 TABLET | Refills: 0 | Status: SHIPPED | OUTPATIENT
Start: 2022-10-13 | End: 2022-11-17 | Stop reason: SDUPTHER

## 2022-10-13 RX ORDER — IBUPROFEN 200 MG
1 TABLET ORAL DAILY
Qty: 28 PATCH | Refills: 0 | Status: SHIPPED | OUTPATIENT
Start: 2022-10-13 | End: 2022-11-17 | Stop reason: SDUPTHER

## 2022-10-13 NOTE — Clinical Note
Patient was seen for tobacco cessation intake.  Patient will begin on 1 mg Chantix and 14 mg nicotine patch.  We discussed self awarness, triggers, tracking usage, reduction/treatment plan and follow up.  Patient has agreed to bi weekly follow up.

## 2022-10-14 NOTE — TELEPHONE ENCOUNTER
Incoming call from pt wife returning calls. Obtained pt consent, [facility], income, and pt would like pt portion sent to address on file. Informed Shelby.

## 2022-10-27 ENCOUNTER — CLINICAL SUPPORT (OUTPATIENT)
Dept: SMOKING CESSATION | Facility: CLINIC | Age: 67
End: 2022-10-27
Payer: COMMERCIAL

## 2022-10-27 DIAGNOSIS — F17.210 LIGHT CIGARETTE SMOKER (1-9 CIGS/DAY): Primary | ICD-10-CM

## 2022-10-27 PROCEDURE — 99403 PREV MED CNSL INDIV APPRX 45: CPT | Mod: S$GLB,,,

## 2022-10-27 PROCEDURE — 99999 PR PBB SHADOW E&M-EST. PATIENT-LVL I: CPT | Mod: PBBFAC,,,

## 2022-10-27 PROCEDURE — 99403 PR PREVENT COUNSEL,INDIV,45 MIN: ICD-10-PCS | Mod: S$GLB,,,

## 2022-10-27 PROCEDURE — 99999 PR PBB SHADOW E&M-EST. PATIENT-LVL I: ICD-10-PCS | Mod: PBBFAC,,,

## 2022-10-27 NOTE — PROGRESS NOTES
Individual Follow-Up Form    10/27/2022    Quit Date:     Clinical Status of Patient: Outpatient      Continuing Medication: yes  Chantix    Other Medications: nicotine patch      Target Symptoms: Withdrawal and medication side effects. The following were  rated moderate (3) to severe (4) on TCRS:  Moderate (3): none  Severe (4): none  Comments: Patient is smoking 6 cpd down from 30 cpd but reports relighting.  We discussed the importance of not relighting tobacco.  Patient will attempt a decrease to 10 cpd without relighting.  Patient understands the difference between decreasing habit and decreasing cpd.   Patient was commended on his success thus far.  We discussed and reviewed: triggers, managing stress & anxiety, support system, continued momentum, setting attainable goals, mindset & motivation, treatment plan and accountability. The patient denies any abnormal behavioral or mental changes at this time. The patient will continue with  therapy sessions and medication monitoring by CTTS. Prescribed medication management will be by physician.          Diagnosis: F17.210    Next Visit: 2 weeks

## 2022-10-27 NOTE — Clinical Note
Patient is smoking 6 cpd down from 30 cpd but reports relighting.  We discussed the importance of not relighting tobacco.  Patient will attempt a decrease to 10 cpd without relighting.  Patient understands the difference between decreasing habit and decreasing cpd.   Patient was commended on his success thus far.  We discussed and reviewed: triggers, managing stress & anxiety, support system, continued momentum, setting attainable goals, mindset & motivation, treatment plan and accountability. The patient denies any abnormal behavioral or mental changes at this time. The patient will continue with  therapy sessions and medication monitoring by CTTS. Prescribed medication management will be by physician.

## 2022-11-14 ENCOUNTER — PATIENT OUTREACH (OUTPATIENT)
Dept: ADMINISTRATIVE | Facility: HOSPITAL | Age: 67
End: 2022-11-14
Payer: MEDICARE

## 2022-11-17 ENCOUNTER — CLINICAL SUPPORT (OUTPATIENT)
Dept: SMOKING CESSATION | Facility: CLINIC | Age: 67
End: 2022-11-17
Payer: COMMERCIAL

## 2022-11-17 DIAGNOSIS — F17.210 MODERATE CIGARETTE SMOKER (10-19 PER DAY): ICD-10-CM

## 2022-11-17 PROCEDURE — 99999 PR PBB SHADOW E&M-EST. PATIENT-LVL I: ICD-10-PCS | Mod: PBBFAC,,,

## 2022-11-17 PROCEDURE — 99403 PREV MED CNSL INDIV APPRX 45: CPT | Mod: S$GLB,,,

## 2022-11-17 PROCEDURE — 99403 PR PREVENT COUNSEL,INDIV,45 MIN: ICD-10-PCS | Mod: S$GLB,,,

## 2022-11-17 PROCEDURE — 99999 PR PBB SHADOW E&M-EST. PATIENT-LVL I: CPT | Mod: PBBFAC,,,

## 2022-11-17 RX ORDER — VARENICLINE TARTRATE 1 MG/1
TABLET, FILM COATED ORAL
Qty: 56 TABLET | Refills: 0 | Status: SHIPPED | OUTPATIENT
Start: 2022-11-17 | End: 2023-10-16

## 2022-11-17 RX ORDER — IBUPROFEN 200 MG
1 TABLET ORAL DAILY
Qty: 28 PATCH | Refills: 0 | Status: SHIPPED | OUTPATIENT
Start: 2022-11-17 | End: 2023-08-28 | Stop reason: SDUPTHER

## 2022-11-17 NOTE — PROGRESS NOTES
Individual Follow-Up Form    11/17/2022    Quit Date:     Clinical Status of Patient: Outpatient    Continuing Medication: yes  Chantix    Other Medications: none     Target Symptoms: Withdrawal and medication side effects. The following were  rated moderate (3) to severe (4) on TCRS:  Moderate (3): none  Severe (4): none    Comments:Patient is smoking 10 cpd or less daily.  Patient was commended on his efforts.  Patient will attempt a quit by next visit.  The patient remains on the prescribed tobacco cessation medication regimen of 1 mg Chantix BID without any negative side effects at this time. Patient remains on prescribed tobacco cessation medication regimen of 14 mg patch without any negative side effects at this time. completion of TCRS (Tobacco Cessation Rating Scale) reviewed strategies, cues, and triggers. Introduced the negative impact of tobacco on health, the health advantages of discontinuing the use of tobacco, time line improved health changes after a quit, withdrawal issues to expect from nicotine and habit, and ways to achieve the goal of a quit. The patient will continue with  therapy sessions and medication monitoring by CTTS. Prescribed medication management will be by physician.            Diagnosis: F17.210    Next Visit: 2 weeks

## 2022-12-29 ENCOUNTER — TELEPHONE (OUTPATIENT)
Dept: PULMONOLOGY | Facility: CLINIC | Age: 67
End: 2022-12-29
Payer: MEDICARE

## 2022-12-29 NOTE — TELEPHONE ENCOUNTER
Outgoing call to pt regarding pt portion of PAP - LVM    Staff message sent to [facility]O  Closing out referral at this time

## 2022-12-29 NOTE — TELEPHONE ENCOUNTER
----- Message from Shelby Fox PharmD sent at 12/29/2022  2:28 PM CST -----  Regarding: Ofev  Good afternoon Dr. Flowers and Staff,    [facility] has been assisting the patient in applying to the Ofev 's patient assistance program. However, we have not received the patient's portion back and we have made numerous unsuccessful attempts to contact the patient. [facility] will close out the referral at this time due to unable to contact patient. If you can reach the patient and he would like [facility] to continue to assist him in getting the Ofev through the , please ask him to give us a call.       Thank you,  Shelby Fox PharmD  Clinical Pharmacist, Financial Assistance Team  [facility]  (P): [phone]  (F): [phone]

## 2023-01-04 ENCOUNTER — TELEPHONE (OUTPATIENT)
Dept: SMOKING CESSATION | Facility: CLINIC | Age: 68
End: 2023-01-04
Payer: MEDICARE

## 2023-01-04 NOTE — TELEPHONE ENCOUNTER
Telephoned patient for follow up.  Patient was unavailable at this time.  I left a detailed message with nature of call and contact information.

## 2023-01-25 ENCOUNTER — TELEPHONE (OUTPATIENT)
Dept: SMOKING CESSATION | Facility: CLINIC | Age: 68
End: 2023-01-25
Payer: MEDICARE

## 2023-02-02 ENCOUNTER — CLINICAL SUPPORT (OUTPATIENT)
Dept: SMOKING CESSATION | Facility: CLINIC | Age: 68
End: 2023-02-02
Payer: COMMERCIAL

## 2023-02-02 DIAGNOSIS — F17.210 MODERATE CIGARETTE SMOKER (10-19 PER DAY): Primary | ICD-10-CM

## 2023-02-02 PROCEDURE — 99406 PR TOBACCO USE CESSATION INTERMEDIATE 3-10 MINUTES: ICD-10-PCS | Mod: S$GLB,,,

## 2023-02-02 PROCEDURE — 99999 PR PBB SHADOW E&M-EST. PATIENT-LVL I: ICD-10-PCS | Mod: PBBFAC,,,

## 2023-02-02 PROCEDURE — 99999 PR PBB SHADOW E&M-EST. PATIENT-LVL I: CPT | Mod: PBBFAC,,,

## 2023-02-02 PROCEDURE — 99406 BEHAV CHNG SMOKING 3-10 MIN: CPT | Mod: S$GLB,,,

## 2023-02-07 DIAGNOSIS — Z00.00 ENCOUNTER FOR MEDICARE ANNUAL WELLNESS EXAM: ICD-10-CM

## 2023-02-09 DIAGNOSIS — Z00.00 ENCOUNTER FOR MEDICARE ANNUAL WELLNESS EXAM: ICD-10-CM

## 2023-04-10 NOTE — PROGRESS NOTES
Colon Cancer screening GAP report- I spoke to pt's wife and she stated pt will call back at another time to schedule a Colon Cancer screening.        Total Number Of Aks Treated: 5

## 2023-04-17 ENCOUNTER — TELEPHONE (OUTPATIENT)
Dept: SMOKING CESSATION | Facility: CLINIC | Age: 68
End: 2023-04-17
Payer: MEDICARE

## 2023-06-17 DIAGNOSIS — I10 ESSENTIAL HYPERTENSION: ICD-10-CM

## 2023-06-17 RX ORDER — METOPROLOL SUCCINATE 50 MG/1
TABLET, EXTENDED RELEASE ORAL
Qty: 90 TABLET | Refills: 0 | Status: SHIPPED | OUTPATIENT
Start: 2023-06-17 | End: 2023-08-14 | Stop reason: SDUPTHER

## 2023-06-17 NOTE — TELEPHONE ENCOUNTER
Care Due:                  Date            Visit Type   Department     Provider  --------------------------------------------------------------------------------                                EP -                              PRIMARY      [facility]  Last Visit: 06-      CARE ([facility])   PRACTICE       Genaro Womack  Next Visit: None Scheduled  None         None Found                                                            Last  Test          Frequency    Reason                     Performed    Due Date  --------------------------------------------------------------------------------    Office Visit  12 months..  EScitalopram, albuterol,   06-   06-                             clopidogreL, metoprolol,                             rosuvastatin, traZODone..    CBC.........  12 months..  clopidogreL..............  06- 06-    CMP.........  12 months..  rosuvastatin.............  06- 06-    Lipid Panel.  12 months..  rosuvastatin.............  06- 06-    [facility] Embedded Care Due Messages. Reference number: [id number].   6/17/2023 12:08:24 PM CDT

## 2023-06-18 NOTE — TELEPHONE ENCOUNTER
Provider Staff:  Action required for this patient     Please see care gap opportunities below in Care Due Message.    Thanks!  [facility]     Appointments      Date Provider   Last Visit   6/15/2022 Genaro Womack MD   Next Visit   Visit date not found Genaro Womack MD      Refill Decision Note   Srinivasa Oliver  is requesting a refill authorization.  Brief Assessment and Rationale for Refill:  Approve     Medication Therapy Plan:         Comments:     Note composed:7:19 PM 06/17/2023

## 2023-06-19 ENCOUNTER — PES CALL (OUTPATIENT)
Dept: ADMINISTRATIVE | Facility: CLINIC | Age: 68
End: 2023-06-19
Payer: MEDICARE

## 2023-07-11 DIAGNOSIS — I73.9 PAD (PERIPHERAL ARTERY DISEASE): ICD-10-CM

## 2023-07-11 DIAGNOSIS — Z95.1 S/P CABG X 3: ICD-10-CM

## 2023-07-11 DIAGNOSIS — I25.10 CORONARY ARTERY DISEASE INVOLVING NATIVE CORONARY ARTERY WITHOUT ANGINA PECTORIS, UNSPECIFIED WHETHER NATIVE OR TRANSPLANTED HEART: ICD-10-CM

## 2023-07-11 DIAGNOSIS — E78.5 HYPERLIPIDEMIA, UNSPECIFIED HYPERLIPIDEMIA TYPE: ICD-10-CM

## 2023-07-11 NOTE — TELEPHONE ENCOUNTER
No care due was identified.  [facility] Embedded Care Due Messages. Reference number: [id number].   7/11/2023 3:32:24 PM CDT

## 2023-07-12 RX ORDER — ROSUVASTATIN CALCIUM 20 MG/1
TABLET, COATED ORAL
Qty: 90 TABLET | Refills: 0 | OUTPATIENT
Start: 2023-07-12

## 2023-07-12 NOTE — TELEPHONE ENCOUNTER
Refill Routing Note   Medication(s) are not appropriate for processing by [facility] for the following reason(s):      Required labs outdated    [facility] action(s):  Defer None identified          Appointments  past 12m or future 3m with PCP    Date Provider   Last Visit   6/15/2022 Genaro Womack MD   Next Visit   Visit date not found Genaro Womack MD   ED visits in past 90 days: 0        Note composed:11:42 PM 07/11/2023

## 2023-08-08 ENCOUNTER — HOSPITAL ENCOUNTER (INPATIENT)
Facility: HOSPITAL | Age: 68
LOS: 3 days | Discharge: HOME | DRG: 392 | End: 2023-08-11
Attending: EMERGENCY MEDICINE | Admitting: INTERNAL MEDICINE
Payer: MEDICARE

## 2023-08-08 DIAGNOSIS — I21.4 NSTEMI (NON-ST ELEVATED MYOCARDIAL INFARCTION): ICD-10-CM

## 2023-08-08 DIAGNOSIS — R94.31 ABNORMAL ELECTROCARDIOGRAM (ECG) (EKG): ICD-10-CM

## 2023-08-08 DIAGNOSIS — R10.11 RIGHT UPPER QUADRANT ABDOMINAL PAIN: Primary | ICD-10-CM

## 2023-08-08 DIAGNOSIS — R00.0 TACHYCARDIA: ICD-10-CM

## 2023-08-08 DIAGNOSIS — R06.02 SHORTNESS OF BREATH: ICD-10-CM

## 2023-08-08 PROBLEM — R10.9 ABDOMINAL PAIN: Status: ACTIVE | Noted: 2023-08-08

## 2023-08-08 LAB
ALBUMIN SERPL BCP-MCNC: 3.1 G/DL (ref 3.5–5.2)
ALP SERPL-CCNC: 94 U/L (ref 55–135)
ALT SERPL W/O P-5'-P-CCNC: <5 U/L (ref 10–44)
ANION GAP SERPL CALC-SCNC: 11 MMOL/L (ref 8–16)
AST SERPL-CCNC: 11 U/L (ref 10–40)
BACTERIA #/AREA URNS HPF: ABNORMAL /HPF
BASOPHILS # BLD AUTO: 0.05 K/UL (ref 0–0.2)
BASOPHILS NFR BLD: 0.3 % (ref 0–1.9)
BILIRUB SERPL-MCNC: 0.8 MG/DL (ref 0.1–1)
BILIRUB UR QL STRIP: NEGATIVE
BNP SERPL-MCNC: 216 PG/ML (ref 0–99)
BUN SERPL-MCNC: 16 MG/DL (ref 8–23)
CALCIUM SERPL-MCNC: 9.1 MG/DL (ref 8.7–10.5)
CHLORIDE SERPL-SCNC: 101 MMOL/L (ref 95–110)
CLARITY UR: CLEAR
CO2 SERPL-SCNC: 20 MMOL/L (ref 23–29)
COLOR UR: YELLOW
CREAT SERPL-MCNC: 1 MG/DL (ref 0.5–1.4)
DIFFERENTIAL METHOD: ABNORMAL
EOSINOPHIL # BLD AUTO: 0 K/UL (ref 0–0.5)
EOSINOPHIL NFR BLD: 0.1 % (ref 0–8)
ERYTHROCYTE [DISTWIDTH] IN BLOOD BY AUTOMATED COUNT: 13.7 % (ref 11.5–14.5)
EST. GFR  (NO RACE VARIABLE): >60 ML/MIN/1.73 M^2
GLUCOSE SERPL-MCNC: 148 MG/DL (ref 70–110)
GLUCOSE UR QL STRIP: NEGATIVE
HCT VFR BLD AUTO: 44.3 % (ref 40–54)
HGB BLD-MCNC: 14.8 G/DL (ref 14–18)
HGB UR QL STRIP: ABNORMAL
HYALINE CASTS #/AREA URNS LPF: 107 /LPF
IMM GRANULOCYTES # BLD AUTO: 0.14 K/UL (ref 0–0.04)
IMM GRANULOCYTES NFR BLD AUTO: 0.8 % (ref 0–0.5)
KETONES UR QL STRIP: NEGATIVE
LEUKOCYTE ESTERASE UR QL STRIP: NEGATIVE
LIPASE SERPL-CCNC: 5 U/L (ref 4–60)
LYMPHOCYTES # BLD AUTO: 0.9 K/UL (ref 1–4.8)
LYMPHOCYTES NFR BLD: 5 % (ref 18–48)
MCH RBC QN AUTO: 33 PG (ref 27–31)
MCHC RBC AUTO-ENTMCNC: 33.4 G/DL (ref 32–36)
MCV RBC AUTO: 99 FL (ref 82–98)
MICROSCOPIC COMMENT: ABNORMAL
MONOCYTES # BLD AUTO: 0.9 K/UL (ref 0.3–1)
MONOCYTES NFR BLD: 5 % (ref 4–15)
NEUTROPHILS # BLD AUTO: 16.4 K/UL (ref 1.8–7.7)
NEUTROPHILS NFR BLD: 88.8 % (ref 38–73)
NITRITE UR QL STRIP: NEGATIVE
NRBC BLD-RTO: 0 /100 WBC
PH UR STRIP: 6 [PH] (ref 5–8)
PLATELET # BLD AUTO: 187 K/UL (ref 150–450)
PMV BLD AUTO: 10 FL (ref 9.2–12.9)
POTASSIUM SERPL-SCNC: 3.5 MMOL/L (ref 3.5–5.1)
PROT SERPL-MCNC: 8 G/DL (ref 6–8.4)
PROT UR QL STRIP: ABNORMAL
RBC # BLD AUTO: 4.49 M/UL (ref 4.6–6.2)
RBC #/AREA URNS HPF: 4 /HPF (ref 0–4)
SODIUM SERPL-SCNC: 132 MMOL/L (ref 136–145)
SP GR UR STRIP: 1.03 (ref 1–1.03)
TROPONIN I SERPL DL<=0.01 NG/ML-MCNC: 0.12 NG/ML (ref 0–0.03)
TROPONIN I SERPL DL<=0.01 NG/ML-MCNC: 0.14 NG/ML (ref 0–0.03)
URN SPEC COLLECT METH UR: ABNORMAL
UROBILINOGEN UR STRIP-ACNC: ABNORMAL EU/DL
WBC # BLD AUTO: 18.45 K/UL (ref 3.9–12.7)
WBC #/AREA URNS HPF: 3 /HPF (ref 0–5)

## 2023-08-08 PROCEDURE — 93005 ELECTROCARDIOGRAM TRACING: CPT | Performed by: INTERNAL MEDICINE

## 2023-08-08 PROCEDURE — 25000003 PHARM REV CODE 250: Performed by: EMERGENCY MEDICINE

## 2023-08-08 PROCEDURE — 36415 COLL VENOUS BLD VENIPUNCTURE: CPT | Performed by: NURSE PRACTITIONER

## 2023-08-08 PROCEDURE — 36415 COLL VENOUS BLD VENIPUNCTURE: CPT | Performed by: EMERGENCY MEDICINE

## 2023-08-08 PROCEDURE — 84484 ASSAY OF TROPONIN QUANT: CPT | Performed by: NURSE PRACTITIONER

## 2023-08-08 PROCEDURE — 85025 COMPLETE CBC W/AUTO DIFF WBC: CPT | Performed by: NURSE PRACTITIONER

## 2023-08-08 PROCEDURE — 93010 EKG 12-LEAD: ICD-10-PCS | Mod: ,,, | Performed by: INTERNAL MEDICINE

## 2023-08-08 PROCEDURE — 96361 HYDRATE IV INFUSION ADD-ON: CPT

## 2023-08-08 PROCEDURE — 83690 ASSAY OF LIPASE: CPT | Performed by: NURSE PRACTITIONER

## 2023-08-08 PROCEDURE — 83880 ASSAY OF NATRIURETIC PEPTIDE: CPT | Performed by: NURSE PRACTITIONER

## 2023-08-08 PROCEDURE — 93010 ELECTROCARDIOGRAM REPORT: CPT | Mod: ,,, | Performed by: INTERNAL MEDICINE

## 2023-08-08 PROCEDURE — 96372 THER/PROPH/DIAG INJ SC/IM: CPT | Performed by: EMERGENCY MEDICINE

## 2023-08-08 PROCEDURE — 84484 ASSAY OF TROPONIN QUANT: CPT | Mod: 91 | Performed by: EMERGENCY MEDICINE

## 2023-08-08 PROCEDURE — 96374 THER/PROPH/DIAG INJ IV PUSH: CPT

## 2023-08-08 PROCEDURE — 81000 URINALYSIS NONAUTO W/SCOPE: CPT | Performed by: NURSE PRACTITIONER

## 2023-08-08 PROCEDURE — 99291 CRITICAL CARE FIRST HOUR: CPT

## 2023-08-08 PROCEDURE — 93005 ELECTROCARDIOGRAM TRACING: CPT

## 2023-08-08 PROCEDURE — 21400001 HC TELEMETRY ROOM

## 2023-08-08 PROCEDURE — 63600175 PHARM REV CODE 636 W HCPCS: Performed by: EMERGENCY MEDICINE

## 2023-08-08 PROCEDURE — 80053 COMPREHEN METABOLIC PANEL: CPT | Performed by: NURSE PRACTITIONER

## 2023-08-08 RX ORDER — CLOPIDOGREL BISULFATE 75 MG/1
75 TABLET ORAL DAILY
Status: DISCONTINUED | OUTPATIENT
Start: 2023-08-09 | End: 2023-08-09

## 2023-08-08 RX ORDER — IBUPROFEN 200 MG
1 TABLET ORAL DAILY
Status: DISCONTINUED | OUTPATIENT
Start: 2023-08-09 | End: 2023-08-12 | Stop reason: HOSPADM

## 2023-08-08 RX ORDER — LANOLIN ALCOHOL/MO/W.PET/CERES
1 CREAM (GRAM) TOPICAL 2 TIMES DAILY
Status: DISCONTINUED | OUTPATIENT
Start: 2023-08-09 | End: 2023-08-12 | Stop reason: HOSPADM

## 2023-08-08 RX ORDER — ALBUTEROL SULFATE 90 UG/1
2 AEROSOL, METERED RESPIRATORY (INHALATION) EVERY 4 HOURS PRN
Status: DISCONTINUED | OUTPATIENT
Start: 2023-08-09 | End: 2023-08-08

## 2023-08-08 RX ORDER — ATORVASTATIN CALCIUM 40 MG/1
40 TABLET, FILM COATED ORAL NIGHTLY
Status: DISCONTINUED | OUTPATIENT
Start: 2023-08-09 | End: 2023-08-12 | Stop reason: HOSPADM

## 2023-08-08 RX ORDER — TRAZODONE HYDROCHLORIDE 50 MG/1
50 TABLET ORAL NIGHTLY
Status: DISCONTINUED | OUTPATIENT
Start: 2023-08-09 | End: 2023-08-12 | Stop reason: HOSPADM

## 2023-08-08 RX ORDER — ASCORBIC ACID 500 MG
1000 TABLET ORAL DAILY
Status: DISCONTINUED | OUTPATIENT
Start: 2023-08-09 | End: 2023-08-12 | Stop reason: HOSPADM

## 2023-08-08 RX ORDER — METOPROLOL SUCCINATE 50 MG/1
50 TABLET, EXTENDED RELEASE ORAL NIGHTLY
Status: DISCONTINUED | OUTPATIENT
Start: 2023-08-09 | End: 2023-08-12 | Stop reason: HOSPADM

## 2023-08-08 RX ORDER — LEVALBUTEROL INHALATION SOLUTION 1.25 MG/3ML
1.25 SOLUTION RESPIRATORY (INHALATION) EVERY 8 HOURS
Status: DISCONTINUED | OUTPATIENT
Start: 2023-08-09 | End: 2023-08-09

## 2023-08-08 RX ORDER — IPRATROPIUM BROMIDE 0.5 MG/2.5ML
0.5 SOLUTION RESPIRATORY (INHALATION) EVERY 4 HOURS
Status: DISCONTINUED | OUTPATIENT
Start: 2023-08-09 | End: 2023-08-09

## 2023-08-08 RX ORDER — ALBUTEROL SULFATE 0.83 MG/ML
2.5 SOLUTION RESPIRATORY (INHALATION) EVERY 6 HOURS PRN
Status: DISCONTINUED | OUTPATIENT
Start: 2023-08-09 | End: 2023-08-12 | Stop reason: HOSPADM

## 2023-08-08 RX ORDER — ENOXAPARIN SODIUM 100 MG/ML
1 INJECTION SUBCUTANEOUS ONCE
Status: COMPLETED | OUTPATIENT
Start: 2023-08-08 | End: 2023-08-08

## 2023-08-08 RX ORDER — ESCITALOPRAM OXALATE 10 MG/1
10 TABLET ORAL DAILY
Status: DISCONTINUED | OUTPATIENT
Start: 2023-08-09 | End: 2023-08-12 | Stop reason: HOSPADM

## 2023-08-08 RX ORDER — ASPIRIN 325 MG
325 TABLET ORAL
Status: COMPLETED | OUTPATIENT
Start: 2023-08-08 | End: 2023-08-08

## 2023-08-08 RX ORDER — IPRATROPIUM BROMIDE AND ALBUTEROL SULFATE 2.5; .5 MG/3ML; MG/3ML
3 SOLUTION RESPIRATORY (INHALATION) EVERY 4 HOURS
Status: DISCONTINUED | OUTPATIENT
Start: 2023-08-09 | End: 2023-08-09

## 2023-08-08 RX ORDER — FENTANYL CITRATE 50 UG/ML
25 INJECTION, SOLUTION INTRAMUSCULAR; INTRAVENOUS
Status: COMPLETED | OUTPATIENT
Start: 2023-08-08 | End: 2023-08-08

## 2023-08-08 RX ORDER — ASPIRIN 81 MG/1
81 TABLET ORAL DAILY
Status: DISCONTINUED | OUTPATIENT
Start: 2023-08-09 | End: 2023-08-09

## 2023-08-08 RX ORDER — VARENICLINE TARTRATE 0.5 MG/1
1 TABLET, FILM COATED ORAL DAILY
Status: DISCONTINUED | OUTPATIENT
Start: 2023-08-09 | End: 2023-08-12 | Stop reason: HOSPADM

## 2023-08-08 RX ADMIN — FENTANYL CITRATE 25 MCG: 50 INJECTION, SOLUTION INTRAMUSCULAR; INTRAVENOUS at 04:08

## 2023-08-08 RX ADMIN — ENOXAPARIN SODIUM 80 MG: 80 INJECTION SUBCUTANEOUS at 03:08

## 2023-08-08 RX ADMIN — SODIUM CHLORIDE 1000 ML: 9 INJECTION, SOLUTION INTRAVENOUS at 02:08

## 2023-08-08 RX ADMIN — ASPIRIN 325 MG: 325 TABLET ORAL at 02:08

## 2023-08-08 NOTE — ED PROVIDER NOTES
Encounter Date: 8/8/2023       History     Chief Complaint   Patient presents with    Abdominal Pain     Right side abdominal pain x 2 days      67-year-old male with a history of a CABG CHF coronary stents presents to the ER with 3 days of right upper quadrant abdominal pain.  No other complaints.  No nausea no vomiting no chest pain or flank pain.  No aggravating or alleviating factors.    The history is provided by the patient and the spouse.     Review of patient's allergies indicates:   Allergen Reactions    Atorvastatin      Other reaction(s): Rash     Past Medical History:   Diagnosis Date    Adenomatous colon polyp 01/28/2011    Adenomatous colon polyp     Anxiety     Blood transfusion     CHF (congestive heart failure)     Emphysema of lung     Hypertension     Renal disorder     kidney stones    S/P coronary artery stent placement 01/07/2013    Staph skin infection     abd/ lanced x 2    Ulcer      Past Surgical History:   Procedure Laterality Date    COLONOSCOPY  01/28/2011    Dr Choudhary, tubular adenoma    CORONARY ARTERY BYPASS GRAFT  2005    CORONARY STENT PLACEMENT      ENDOBRONCHIAL ULTRASOUND N/A 7/18/2022    Procedure: ENDOBRONCHIAL ULTRASOUND (EBUS);  Surgeon: Alli Sandra MD;  Location: [facility];  Service: Pulmonary;  Laterality: N/A;    NAVIGATIONAL BRONCHOSCOPY Bilateral 7/18/2022    Procedure: BRONCHOSCOPY, NAVIGATIONAL;  Surgeon: Alli Sandra MD;  Location: [facility];  Service: Pulmonary;  Laterality: Bilateral;     Family History   Problem Relation Age of Onset    Heart disease Mother     Heart disease Father     Diabetes Father     Heart disease Maternal Aunt     No Known Problems Sister     No Known Problems Brother     No Known Problems Daughter     No Known Problems Son      Social History     Tobacco Use    Smoking status: Every Day     Current packs/day: 0.00     Average packs/day: 0.8 packs/day for 42.0 years (31.5 ttl pk-yrs)     Types: Cigarettes     Start date: 5/31/1976      Last attempt to quit: 2018     Years since quittin.1    Smokeless tobacco: Never    Tobacco comments:     patient room 7    Substance Use Topics    Alcohol use: No     Alcohol/week: 0.0 standard drinks of alcohol    Drug use: No     Review of Systems   Constitutional:  Negative for fever.   HENT:  Negative for sore throat.    Respiratory:  Negative for shortness of breath.    Cardiovascular:  Negative for chest pain.   Gastrointestinal:  Positive for abdominal pain. Negative for nausea.   Genitourinary:  Negative for dysuria.   Musculoskeletal:  Negative for back pain.   Skin:  Negative for rash.   Neurological:  Negative for weakness.   All other systems reviewed and are negative.      Physical Exam     Initial Vitals   BP Pulse Resp Temp SpO2   23 1123 23 1123 23 1123 23 1123 23 1125   (!) 109/59 107 (!) 22 98 °F (36.7 °C) (!) 92 %      MAP       --                Physical Exam    Constitutional: He appears well-developed and well-nourished. He is not diaphoretic. He is cooperative.  Non-toxic appearance. He does not have a sickly appearance. He does not appear ill. No distress.   HENT:   Head: Normocephalic and atraumatic.   Eyes: Conjunctivae are normal. No scleral icterus.   Neck: Neck supple.    Full passive range of motion without pain.     Cardiovascular:  Normal rate, regular rhythm, S1 normal, S2 normal and normal heart sounds.           No murmur heard.  Abdominal: Abdomen is soft. He exhibits no distension. There is abdominal tenderness in the right upper quadrant. There is no rebound and no guarding.   Musculoskeletal:      Cervical back: Full passive range of motion without pain and neck supple. No rigidity. Normal range of motion.     Neurological: He is alert. He has normal strength.   Skin: Skin is warm and dry.   Psychiatric: He has a normal mood and affect. His speech is normal and behavior is normal. Judgment and thought content normal. Cognition and memory  are normal.         ED Course   Critical Care    Date/Time: 8/8/2023 11:16 AM    Performed by: Genaro Dang MD  Authorized by: Robin Cardona MD  Direct patient critical care time: 80 minutes  Ordering / reviewing critical care time: 15 minutes  Documentation critical care time: 10 minutes  Consulting other physicians critical care time: 10 ([facility] medicine) minutes  Total critical care time (exclusive of procedural time) : 115 minutes  Critical care was necessary to treat or prevent imminent or life-threatening deterioration of the following conditions: nstemi.  Critical care was time spent personally by me on the following activities: development of treatment plan with patient or surrogate, discussions with consultants, evaluation of patient's response to treatment, examination of patient, obtaining history from patient or surrogate, ordering and performing treatments and interventions, ordering and review of laboratory studies, pulse oximetry, ordering and review of radiographic studies, re-evaluation of patient's condition and review of old charts.        Labs Reviewed   CBC W/ AUTO DIFFERENTIAL - Abnormal; Notable for the following components:       Result Value    WBC 18.45 (*)     RBC 4.49 (*)     MCV 99 (*)     MCH 33.0 (*)     Immature Granulocytes 0.8 (*)     Gran # (ANC) 16.4 (*)     Immature Grans (Abs) 0.14 (*)     Lymph # 0.9 (*)     Gran % 88.8 (*)     Lymph % 5.0 (*)     All other components within normal limits   COMPREHENSIVE METABOLIC PANEL   LIPASE   URINALYSIS, REFLEX TO URINE CULTURE   B-TYPE NATRIURETIC PEPTIDE   TROPONIN I          Imaging Results              X-Ray Chest 1 View (In process)                      Medications   sodium chloride 0.9% bolus 1,000 mL 1,000 mL (has no administration in time range)     Medical Decision Making:   Initial Assessment:   3 days of right upper quadrant pain  Differential Diagnosis:   Cholecystitis, cholelithiasis, renal  colic  Clinical Tests:   Lab Tests: Ordered and Reviewed  Radiological Study: Ordered and Reviewed  Medical Tests: Ordered and Reviewed  ED Management:  EKG labs ultrasound case discussed with cardiology  Other:   I have discussed this case with another health care provider.       <> Summary of the Discussion: 1417 Case discussed with Dr. PASTRANA who is actually on-call for Cardiology.  He will look at the EKG and call me back. [EF]    1438 Dr. PASTRANA has reviewed the EKG and agrees with transfer the patient to [facility] for possible NSTEMI.  He agrees with 1 dose of Lovenox and a dose of aspirin. [EF]    1516 accepted at [facility] by Dr. Neves [EF]  1534 Dr Neves reports due to IT issue she cannot admit [facility] patients, IT is aware and working on it   [EF]    67-year-old male with a history of CAD (he does not remember his cardiologist) presents to the ER 3 days of constant right upper quadrant pain.  EKG is concerning for posterior MI with ST depression in V2 V3 V4.  I did have Cardiology review this.  Dr. PASTRANA does not think patient needs to go directly to the cath lab.  Ultrasound demonstrates coli lithiasis with no evidence of cholecystitis however his white count is elevated and he might have an early cholecystitis.  Troponin is also elevated.  He was given aspirin and Lovenox and will need to be transferred to [facility].  Patient is well-appearing in the emergency room and is in no distress.             ED Course as of 08/09/23 1426   Tue Aug 08, 2023   1330 WBC(!): 18.45 [EF]   1330 Hemoglobin: 14.8 [EF]   1330 Platelets: 187  Sinus rhythm 84 beats per minute no ST elevation but he has ST depression in V2 and V3 concerning for posterior ischemia these changes appear to be new from prior EKGs.  Independently interpreted. [EF]   1340 Case discussed with Dr. Gutiérrez who will review the patient's EKG and get back to me.  His EKG is concerning for a posterior  MI with ST depression in V2 and V3.  However he has absolutely no chest pain or shortness of breath or nausea or vomiting or really anything that could be considered an anginal equivalent.  He does however have a history of a CABG. [EF]   1358 WBC(!): 18.45 [EF]   1358 X-Ray Chest 1 View [EF]   1408 Troponin I(!!): 0.118 [EF]   1417 Case discussed with Dr. PASTRANA who is actually on-call for Cardiology.  He will look at the EKG and call me back. [EF]   1427 US Abdomen Limited [EF]   1438 Dr. PASTRANA has reviewed the EKG and agrees with transfer the patient to [facility] for possible NSTEMI.  He agrees with 1 dose of Lovenox and a dose of aspirin. [EF]   1452 Patient does not remember his cardiologist [EF]   1516 Up-to-date at [facility] by Dr. Neves [EF]   1534 Dr Neves reports due to IT issue she cannot admit [facility] patients, IT is aware and working on it   [EF]      ED Course User Index  [EF] Genaro Dang MD                 Clinical Impression:   Final diagnoses:  [R00.0] Tachycardia  [R06.02] Shortness of breath               Genaro Dang MD  08/09/23 8021

## 2023-08-08 NOTE — Clinical Note
Diagnosis: NSTEMI (non-ST elevated myocardial infarction) [295778]   Admitting Provider:: RAJIV GUTIERREZ [[id number]]   Admit to which facility:: [facility] [[id number]]   Future Attending Provider: JONG MENDOZA [[id number]]   Reason for IP Medical Treatment  (Clinical interventions that can only be accomplished in the IP setting? ) :: nstemi, gallstones,   I certify that Inpatient services for greater than or equal to 2 midnights are medically necessary:: Yes   Plans for Post-Acute care--if anticipated (pick the single best option):: A. No post acute care anticipated at this time

## 2023-08-08 NOTE — FIRST PROVIDER EVALUATION
Emergency Department TeleTriage Encounter Note      CHIEF COMPLAINT    Chief Complaint   Patient presents with    Abdominal Pain     Right side abdominal pain x 2 days        VITAL SIGNS   Initial Vitals   BP Pulse Resp Temp SpO2   08/08/23 1123 08/08/23 1123 08/08/23 1123 08/08/23 1123 08/08/23 1125   (!) 109/59 107 (!) 22 98 °F (36.7 °C) (!) 92 %      MAP       --                   ALLERGIES    Review of patient's allergies indicates:   Allergen Reactions    Atorvastatin      Other reaction(s): Rash       PROVIDER TRIAGE NOTE  This is a teletriage evaluation of a 67 y.o. male presenting to the ED complaining of right sided abd pain for two days. Denies n/v/d and urinary symptoms. States that he was recently doing some repair work and may have been lying on his side which started the pain.     Pt is mildly hypoxic. No report of CP or SOB. Also tachycardic. Will start labs and EKG.     Initial orders will be placed and care will be transferred to an alternate provider when patient is roomed for a full evaluation. Any additional orders and the final disposition will be determined by that provider.       ORDERS  Labs Reviewed   CBC W/ AUTO DIFFERENTIAL   COMPREHENSIVE METABOLIC PANEL   LIPASE   URINALYSIS, REFLEX TO URINE CULTURE   ISTAT CHEM8       ED Orders (720h ago, onward)      Start Ordered     Status Ordering Provider    08/08/23 1128 08/08/23 1127  Vital signs  Every 2 hours         Ordered MADIHA LEDESMA N.    08/08/23 1128 08/08/23 1127  Pulse Oximetry Continuous  Continuous         Ordered MADIHA LEDESMA N.    08/08/23 1128 08/08/23 1127  Cardiac Monitoring - Adult  Continuous        Comments: Notify Physician If:    Ordered MADIHA LEDESMA N.    08/08/23 1128 08/08/23 1127  EKG 12-lead  Once         Ordered MADIHA LEDESMA N.    08/08/23 1128 08/08/23 1127  Brain natriuretic peptide  STAT         Ordered MADIHA LEDESMA N.    08/08/23 1128 08/08/23 1127  Troponin I   STAT         Ordered JUDSONARNIE MADIHA N.    08/08/23 1127 08/08/23 1127  Diet NPO  Diet effective now         Ordered JUDSONMADIHA GAITAN N.    08/08/23 1127 08/08/23 1127  Insert peripheral IV  Once         Ordered MADIHA LEDESMA N.    08/08/23 1127 08/08/23 1127  CBC W/ AUTO DIFFERENTIAL  STAT         Ordered MADIHA LEDESMA N.    08/08/23 1127 08/08/23 1127  Comp. Metabolic Panel  STAT         Ordered JUDSONARNIE MADIHA N.    08/08/23 1127 08/08/23 1127  ISTAT CHEM8  Once         Ordered MADIHA LEDESMA N.    08/08/23 1127 08/08/23 1127  Lipase  STAT         Ordered MADIHA LEDESMA N.    08/08/23 1127 08/08/23 1127  Urinalysis, Reflex to Urine Culture Urine, Clean Catch  STAT         Ordered MADIHA LEDESMA              Virtual Visit Note: The provider triage portion of this emergency department evaluation and documentation was performed via [employer], a HIPAA-compliant telemedicine application, in concert with a tele-presenter in the room. A face to face patient evaluation with one of my colleagues will occur once the patient is placed in an emergency department room.      DISCLAIMER: This note was prepared with [employer] voice recognition transcription software. Garbled syntax, mangled pronouns, and other bizarre constructions may be attributed to that software system.

## 2023-08-09 ENCOUNTER — CLINICAL SUPPORT (OUTPATIENT)
Dept: SMOKING CESSATION | Facility: CLINIC | Age: 68
End: 2023-08-09
Payer: COMMERCIAL

## 2023-08-09 ENCOUNTER — CLINICAL SUPPORT (OUTPATIENT)
Dept: CARDIOLOGY | Facility: HOSPITAL | Age: 68
DRG: 392 | End: 2023-08-09
Attending: EMERGENCY MEDICINE
Payer: MEDICARE

## 2023-08-09 VITALS — BODY MASS INDEX: 25.18 KG/M2 | WEIGHT: 179.88 LBS | HEIGHT: 71 IN

## 2023-08-09 DIAGNOSIS — F17.200 NICOTINE DEPENDENCE: Primary | ICD-10-CM

## 2023-08-09 LAB
ALBUMIN SERPL BCP-MCNC: 3.1 G/DL (ref 3.5–5.2)
ALP SERPL-CCNC: 79 U/L (ref 55–135)
ALT SERPL W/O P-5'-P-CCNC: 8 U/L (ref 10–44)
ANION GAP SERPL CALC-SCNC: 9 MMOL/L (ref 8–16)
AORTIC ROOT ANNULUS: 3.2 CM
AORTIC VALVE CUSP SEPERATION: 5.2 CM
AST SERPL-CCNC: 11 U/L (ref 10–40)
AV INDEX (PROSTH): 0.61
AV MEAN GRADIENT: 6 MMHG
AV PEAK GRADIENT: 11 MMHG
AV VALVE AREA BY VELOCITY RATIO: 2.39 CM²
AV VALVE AREA: 2.52 CM²
AV VELOCITY RATIO: 0.58
BACTERIA #/AREA URNS HPF: NEGATIVE /HPF
BASOPHILS # BLD AUTO: 0.03 K/UL (ref 0–0.2)
BASOPHILS NFR BLD: 0.2 % (ref 0–1.9)
BILIRUB SERPL-MCNC: 1.1 MG/DL (ref 0.1–1)
BILIRUB UR QL STRIP: NEGATIVE
BSA FOR ECHO PROCEDURE: 2.02 M2
BUN SERPL-MCNC: 15 MG/DL (ref 8–23)
CALCIUM SERPL-MCNC: 8.5 MG/DL (ref 8.7–10.5)
CHLORIDE SERPL-SCNC: 104 MMOL/L (ref 95–110)
CLARITY UR: CLEAR
CO2 SERPL-SCNC: 22 MMOL/L (ref 23–29)
COLOR UR: YELLOW
CREAT SERPL-MCNC: 0.6 MG/DL (ref 0.5–1.4)
CV ECHO LV RWT: 0.39 CM
DIFFERENTIAL METHOD: ABNORMAL
DOP CALC AO PEAK VEL: 1.65 M/S
DOP CALC AO VTI: 34.3 CM
DOP CALC LVOT AREA: 4.2 CM2
DOP CALC LVOT DIAMETER: 2.3 CM
DOP CALC LVOT PEAK VEL: 0.95 M/S
DOP CALC LVOT STROKE VOLUME: 86.38 CM3
DOP CALC MV VTI: 31.6 CM
DOP CALCLVOT PEAK VEL VTI: 20.8 CM
E WAVE DECELERATION TIME: 215 MSEC
E/A RATIO: 0.88
E/E' RATIO: 6.72 M/S
ECHO LV POSTERIOR WALL: 0.83 CM (ref 0.6–1.1)
EJECTION FRACTION: 60 %
EOSINOPHIL # BLD AUTO: 0.1 K/UL (ref 0–0.5)
EOSINOPHIL NFR BLD: 0.7 % (ref 0–8)
ERYTHROCYTE [DISTWIDTH] IN BLOOD BY AUTOMATED COUNT: 14 % (ref 11.5–14.5)
EST. GFR  (NO RACE VARIABLE): >60 ML/MIN/1.73 M^2
FRACTIONAL SHORTENING: 32 % (ref 28–44)
GLUCOSE SERPL-MCNC: 102 MG/DL (ref 70–110)
GLUCOSE UR QL STRIP: NEGATIVE
HCT VFR BLD AUTO: 40.4 % (ref 40–54)
HGB BLD-MCNC: 13.6 G/DL (ref 14–18)
HGB UR QL STRIP: NEGATIVE
HYALINE CASTS #/AREA URNS LPF: 6 /LPF
IMM GRANULOCYTES # BLD AUTO: 0.06 K/UL (ref 0–0.04)
IMM GRANULOCYTES NFR BLD AUTO: 0.4 % (ref 0–0.5)
INTERVENTRICULAR SEPTUM: 0.93 CM (ref 0.6–1.1)
KETONES UR QL STRIP: ABNORMAL
LACTATE SERPL-SCNC: 0.8 MMOL/L (ref 0.5–1.9)
LEFT ATRIUM SIZE: 3.9 CM
LEFT INTERNAL DIMENSION IN SYSTOLE: 2.9 CM (ref 2.1–4)
LEFT VENTRICLE DIASTOLIC VOLUME INDEX: 39.8 ML/M2
LEFT VENTRICLE DIASTOLIC VOLUME: 80.4 ML
LEFT VENTRICLE MASS INDEX: 58 G/M2
LEFT VENTRICLE SYSTOLIC VOLUME INDEX: 15.9 ML/M2
LEFT VENTRICLE SYSTOLIC VOLUME: 32.2 ML
LEFT VENTRICULAR INTERNAL DIMENSION IN DIASTOLE: 4.24 CM (ref 3.5–6)
LEFT VENTRICULAR MASS: 116.89 G
LEUKOCYTE ESTERASE UR QL STRIP: NEGATIVE
LV LATERAL E/E' RATIO: 4.94 M/S
LV SEPTAL E/E' RATIO: 10.5 M/S
LVOT MG: 2 MMHG
LVOT MV: 0.6 CM/S
LYMPHOCYTES # BLD AUTO: 1.2 K/UL (ref 1–4.8)
LYMPHOCYTES NFR BLD: 9.2 % (ref 18–48)
MCH RBC QN AUTO: 32.9 PG (ref 27–31)
MCHC RBC AUTO-ENTMCNC: 33.7 G/DL (ref 32–36)
MCV RBC AUTO: 98 FL (ref 82–98)
MICROSCOPIC COMMENT: ABNORMAL
MONOCYTES # BLD AUTO: 0.8 K/UL (ref 0.3–1)
MONOCYTES NFR BLD: 5.6 % (ref 4–15)
MV MEAN GRADIENT: 2 MMHG
MV PEAK A VEL: 0.95 M/S
MV PEAK E VEL: 0.84 M/S
MV PEAK GRADIENT: 4 MMHG
MV STENOSIS PRESSURE HALF TIME: 75 MS
MV VALVE AREA BY CONTINUITY EQUATION: 2.73 CM2
MV VALVE AREA P 1/2 METHOD: 2.93 CM2
NEUTROPHILS # BLD AUTO: 11.3 K/UL (ref 1.8–7.7)
NEUTROPHILS NFR BLD: 83.9 % (ref 38–73)
NITRITE UR QL STRIP: NEGATIVE
NRBC BLD-RTO: 0 /100 WBC
PH UR STRIP: 6 [PH] (ref 5–8)
PISA TR MAX VEL: 4.02 M/S
PLATELET # BLD AUTO: 173 K/UL (ref 150–450)
PMV BLD AUTO: 9.9 FL (ref 9.2–12.9)
POTASSIUM SERPL-SCNC: 3.5 MMOL/L (ref 3.5–5.1)
PROT SERPL-MCNC: 7.2 G/DL (ref 6–8.4)
PROT UR QL STRIP: ABNORMAL
PV MV: 0.69 M/S
PV PEAK GRADIENT: 4 MMHG
PV PEAK VELOCITY: 1.05 M/S
RA PRESSURE ESTIMATED: 3 MMHG
RBC # BLD AUTO: 4.13 M/UL (ref 4.6–6.2)
RBC #/AREA URNS HPF: 4 /HPF (ref 0–4)
RIGHT VENTRICULAR END-DIASTOLIC DIMENSION: 3.71 CM
RV TB RVSP: 7 MMHG
RV TISSUE DOPPLER FREE WALL SYSTOLIC VELOCITY 1 (APICAL 4 CHAMBER VIEW): 9.72 CM/S
SODIUM SERPL-SCNC: 135 MMOL/L (ref 136–145)
SP GR UR STRIP: 1.02 (ref 1–1.03)
SQUAMOUS #/AREA URNS HPF: 1 /HPF
TDI LATERAL: 0.17 M/S
TDI SEPTAL: 0.08 M/S
TDI: 0.13 M/S
TR MAX PG: 65 MMHG
TRICUSPID ANNULAR PLANE SYSTOLIC EXCURSION: 1.9 CM
TROPONIN I SERPL HS-MCNC: 42.7 PG/ML (ref 0–14.9)
TROPONIN I SERPL HS-MCNC: 80.5 PG/ML (ref 0–14.9)
TV REST PULMONARY ARTERY PRESSURE: 68 MMHG
URN SPEC COLLECT METH UR: ABNORMAL
UROBILINOGEN UR STRIP-ACNC: ABNORMAL EU/DL
WBC # BLD AUTO: 13.47 K/UL (ref 3.9–12.7)
WBC #/AREA URNS HPF: 2 /HPF (ref 0–5)
Z-SCORE OF LEFT VENTRICULAR DIMENSION IN END DIASTOLE: -3.39
Z-SCORE OF LEFT VENTRICULAR DIMENSION IN END SYSTOLE: -1.82

## 2023-08-09 PROCEDURE — 99900031 HC PATIENT EDUCATION (STAT)

## 2023-08-09 PROCEDURE — 25000003 PHARM REV CODE 250: Performed by: INTERNAL MEDICINE

## 2023-08-09 PROCEDURE — 94761 N-INVAS EAR/PLS OXIMETRY MLT: CPT

## 2023-08-09 PROCEDURE — 93306 TTE W/DOPPLER COMPLETE: CPT

## 2023-08-09 PROCEDURE — 84484 ASSAY OF TROPONIN QUANT: CPT | Mod: 91

## 2023-08-09 PROCEDURE — 99900035 HC TECH TIME PER 15 MIN (STAT)

## 2023-08-09 PROCEDURE — 81001 URINALYSIS AUTO W/SCOPE: CPT | Performed by: INTERNAL MEDICINE

## 2023-08-09 PROCEDURE — 36415 COLL VENOUS BLD VENIPUNCTURE: CPT

## 2023-08-09 PROCEDURE — 94799 UNLISTED PULMONARY SVC/PX: CPT

## 2023-08-09 PROCEDURE — 93306 ECHO (CUPID ONLY): ICD-10-PCS | Mod: 26,,, | Performed by: GENERAL PRACTICE

## 2023-08-09 PROCEDURE — 94640 AIRWAY INHALATION TREATMENT: CPT

## 2023-08-09 PROCEDURE — 99407 PR TOBACCO USE CESSATION INTENSIVE >10 MINUTES: ICD-10-PCS | Mod: S$GLB,,, | Performed by: INTERNAL MEDICINE

## 2023-08-09 PROCEDURE — S4991 NICOTINE PATCH NONLEGEND: HCPCS | Performed by: INTERNAL MEDICINE

## 2023-08-09 PROCEDURE — 27000221 HC OXYGEN, UP TO 24 HOURS

## 2023-08-09 PROCEDURE — 85025 COMPLETE CBC W/AUTO DIFF WBC: CPT | Performed by: INTERNAL MEDICINE

## 2023-08-09 PROCEDURE — 83605 ASSAY OF LACTIC ACID: CPT | Performed by: INTERNAL MEDICINE

## 2023-08-09 PROCEDURE — 99223 1ST HOSP IP/OBS HIGH 75: CPT | Mod: ,,, | Performed by: SURGERY

## 2023-08-09 PROCEDURE — 84484 ASSAY OF TROPONIN QUANT: CPT | Performed by: INTERNAL MEDICINE

## 2023-08-09 PROCEDURE — 21400001 HC TELEMETRY ROOM

## 2023-08-09 PROCEDURE — 63600175 PHARM REV CODE 636 W HCPCS: Performed by: INTERNAL MEDICINE

## 2023-08-09 PROCEDURE — 25000242 PHARM REV CODE 250 ALT 637 W/ HCPCS: Performed by: INTERNAL MEDICINE

## 2023-08-09 PROCEDURE — 93306 TTE W/DOPPLER COMPLETE: CPT | Mod: 26,,, | Performed by: GENERAL PRACTICE

## 2023-08-09 PROCEDURE — 80053 COMPREHEN METABOLIC PANEL: CPT | Performed by: INTERNAL MEDICINE

## 2023-08-09 PROCEDURE — 99407 BEHAV CHNG SMOKING > 10 MIN: CPT | Mod: S$GLB,,, | Performed by: INTERNAL MEDICINE

## 2023-08-09 PROCEDURE — 87040 BLOOD CULTURE FOR BACTERIA: CPT | Performed by: INTERNAL MEDICINE

## 2023-08-09 PROCEDURE — 99223 PR INITIAL HOSPITAL CARE,LEVL III: ICD-10-PCS | Mod: ,,, | Performed by: SURGERY

## 2023-08-09 RX ORDER — IPRATROPIUM BROMIDE AND ALBUTEROL SULFATE 2.5; .5 MG/3ML; MG/3ML
3 SOLUTION RESPIRATORY (INHALATION) EVERY 6 HOURS
Status: DISCONTINUED | OUTPATIENT
Start: 2023-08-09 | End: 2023-08-12 | Stop reason: HOSPADM

## 2023-08-09 RX ORDER — ENOXAPARIN SODIUM 100 MG/ML
1 INJECTION SUBCUTANEOUS EVERY 12 HOURS
Status: DISCONTINUED | OUTPATIENT
Start: 2023-08-09 | End: 2023-08-11

## 2023-08-09 RX ORDER — MORPHINE SULFATE 2 MG/ML
2 INJECTION, SOLUTION INTRAMUSCULAR; INTRAVENOUS EVERY 4 HOURS PRN
Status: DISCONTINUED | OUTPATIENT
Start: 2023-08-09 | End: 2023-08-12 | Stop reason: HOSPADM

## 2023-08-09 RX ADMIN — PIPERACILLIN AND TAZOBACTAM 3.38 G: 3; .375 INJECTION, POWDER, LYOPHILIZED, FOR SOLUTION INTRAVENOUS; PARENTERAL at 05:08

## 2023-08-09 RX ADMIN — ATORVASTATIN CALCIUM 40 MG: 40 TABLET, FILM COATED ORAL at 08:08

## 2023-08-09 RX ADMIN — ENOXAPARIN SODIUM 80 MG: 100 INJECTION SUBCUTANEOUS at 08:08

## 2023-08-09 RX ADMIN — OXYCODONE HYDROCHLORIDE AND ACETAMINOPHEN 1000 MG: 500 TABLET ORAL at 09:08

## 2023-08-09 RX ADMIN — TRAZODONE HYDROCHLORIDE 50 MG: 50 TABLET ORAL at 01:08

## 2023-08-09 RX ADMIN — MORPHINE SULFATE 2 MG: 2 INJECTION, SOLUTION INTRAMUSCULAR; INTRAVENOUS at 03:08

## 2023-08-09 RX ADMIN — METOPROLOL SUCCINATE 50 MG: 50 TABLET, FILM COATED, EXTENDED RELEASE ORAL at 08:08

## 2023-08-09 RX ADMIN — NICOTINE 1 PATCH: 14 PATCH, EXTENDED RELEASE TRANSDERMAL at 09:08

## 2023-08-09 RX ADMIN — ATORVASTATIN CALCIUM 40 MG: 40 TABLET, FILM COATED ORAL at 01:08

## 2023-08-09 RX ADMIN — TRAZODONE HYDROCHLORIDE 50 MG: 50 TABLET ORAL at 08:08

## 2023-08-09 RX ADMIN — FERROUS SULFATE TAB 325 MG (65 MG ELEMENTAL FE) 1 EACH: 325 (65 FE) TAB at 09:08

## 2023-08-09 RX ADMIN — FERROUS SULFATE TAB 325 MG (65 MG ELEMENTAL FE) 1 EACH: 325 (65 FE) TAB at 01:08

## 2023-08-09 RX ADMIN — IPRATROPIUM BROMIDE AND ALBUTEROL SULFATE 3 ML: 2.5; .5 SOLUTION RESPIRATORY (INHALATION) at 02:08

## 2023-08-09 RX ADMIN — FERROUS SULFATE TAB 325 MG (65 MG ELEMENTAL FE) 1 EACH: 325 (65 FE) TAB at 08:08

## 2023-08-09 RX ADMIN — IPRATROPIUM BROMIDE AND ALBUTEROL SULFATE 3 ML: 2.5; .5 SOLUTION RESPIRATORY (INHALATION) at 07:08

## 2023-08-09 RX ADMIN — IPRATROPIUM BROMIDE AND ALBUTEROL SULFATE 3 ML: 2.5; .5 SOLUTION RESPIRATORY (INHALATION) at 08:08

## 2023-08-09 RX ADMIN — MORPHINE SULFATE 2 MG: 2 INJECTION, SOLUTION INTRAMUSCULAR; INTRAVENOUS at 08:08

## 2023-08-09 RX ADMIN — VARENICLINE TARTRATE 1 MG: 0.5 TABLET, FILM COATED ORAL at 09:08

## 2023-08-09 RX ADMIN — ESCITALOPRAM OXALATE 10 MG: 10 TABLET ORAL at 09:08

## 2023-08-09 RX ADMIN — METOPROLOL SUCCINATE 50 MG: 50 TABLET, FILM COATED, EXTENDED RELEASE ORAL at 01:08

## 2023-08-09 NOTE — ASSESSMENT & PLAN NOTE
Uncertain as to the etiology of his pain.  I do not believe this is related to cholecystitis.  He may be suffering a bit from biliary dyskinesia.  Given the presence of blood thinners as overall pulmonary status would be very reluctant to proceed with cholecystectomy at the present time.  Would consider elective cholecystectomy if able to stop Plavix for 5 days and if cleared by his pulmonologist.  If further workup is concerning for cholecystitis would recommend IR cholecystostomy tube.  No further surgical plans are present.  Will sign off.  Reconsult should there be any acute changes in his overall condition.

## 2023-08-09 NOTE — HOSPITAL COURSE
Patient is a 67-year-old gentleman with a history of coronary disease who was admitted with right upper quadrant abdominal pain concerns for biliary source of his pain versus atypical cardiac pain.  Was evaluated for gallbladder etiology and had HIDA scan which showed possible biliary dyskinesia however his liver function tests remained normal.  He underwent cardiac evaluation with stress testing which was negative for any inducible ischemia.  Cardiology has cleared him for holding his Plavix for 5 days if needed for any surgical intervention.  General surgery evaluated him and recommend outpatient follow-up if he was having continued symptoms he can have elective cholecystectomy at that time.  On my examination it appears the patient has a bruise at the area of his pain related to recent mechanical work on his car.  His symptoms improved during the time of his hospitalization.  He has been constipated as well and we were able to resolve this problem with an aggressive bowel regimen.  He can continue MiraLax over-the-counter for maintenance bowel regimen upon discharge.  I have seen and evaluated the patient the day of discharge and he was back to his baseline and feeling much better.  I reviewed strict return precautions.  He was discharged in good condition with plans for close outpatient follow-up.

## 2023-08-09 NOTE — CONSULTS
[facility]  General Surgery  Consult Note    Patient Name: Srinivasa Oliver  MRN: [id number]  Code Status: No Order  Admission Date: 8/8/2023  Hospital Length of Stay: 1 days  Attending Physician: Harvinder Pimentel MD  Primary Care Provider: Genaro Womack MD    Patient information was obtained from patient and ER records.     Inpatient consult to General Surgery  Consult performed by: Rudy Boyle MD  Consult ordered by: Willie Millan MD        Subjective:     Principal Problem: Abdominal pain    History of Present Illness: This is a pleasant 67-year-old gentleman who was admitted to the hospital overnight with abdominal pain in the right upper quadrant.  Patient notes the pain is been present now for 4 days.  He states that is a pretty persistent pain.  He denies any nausea or vomiting.  He has had no fevers or chills.  On admission he had an ultrasound which did demonstrate cholelithiasis.  There was however no inflammatory changes.  No pericholecystic fluid and no findings consistent with cholecystitis.  He had a HIDA scan performed this morning which demonstrated filling of the gallbladder again with no evidence of cholecystitis.  There were findings suggestive of delayed emptying of the gallbladder consistent with biliary dyskinesia.  He notes he silk and has pain but is asking to eat.  Of note patient has significant coronary artery disease.  He has stents were which were placed in the last year.  He is on Plavix for this.  He also has history of pulmonary fibrosis.  He is reportedly on home oxygen.  No significant abdominal surgical history      No current facility-administered medications on file prior to encounter.     Current Outpatient Medications on File Prior to Encounter   Medication Sig    traZODone (DESYREL) 50 MG tablet Take 1 tablet (50 mg total) by mouth every evening.    albuterol (PROVENTIL HFA) 90 mcg/actuation inhaler Inhale 2 puffs into the lungs every 4 (four)  hours as needed for Wheezing. Every 4-6 hours PRN    albuterol (PROVENTIL) 2.5 mg /3 mL (0.083 %) nebulizer solution Take 3 mLs (2.5 mg total) by nebulization every 6 (six) hours as needed for Wheezing. Rescue    ascorbic acid, vitamin C, (VITAMIN C) 1000 MG tablet Take 1,000 mg by mouth once daily.    aspirin (ECOTRIN) 81 MG EC tablet Take 81 mg by mouth once daily.    clopidogreL (PLAVIX) 75 mg tablet Take 1 tablet (75 mg total) by mouth once daily. Need office visit before next refill, last seen 1/2019. This will be the LAST Rx if visit is not made.    coenzyme Q10-vitamin E 100-100 mg-unit Cap Take 200 mg by mouth once daily. Every day    DOCOSAHEXANOIC ACID/EPA (FISH OIL ORAL) Take 1 capsule by mouth 2 (two) times daily as needed. Twice a day    EScitalopram oxalate (LEXAPRO) 10 MG tablet Take 1 tablet (10 mg total) by mouth once daily. (Patient taking differently: Take 10 mg by mouth every evening.)    ferrous sulfate (FEOSOL) 325 mg (65 mg iron) Tab tablet Take 325 mg by mouth daily with breakfast.    ipratropium-albuteroL (COMBIVENT RESPIMAT)  mcg/actuation inhaler Inhale 1 puff into the lungs every 6 (six) hours as needed for Wheezing. Rescue    metoprolol succinate (TOPROL-XL) 50 MG 24 hr tablet Take 1 tablet by mouth in the evening    nicotine (NICODERM CQ) 14 mg/24 hr Place 1 patch onto the skin once daily.    nintedanib (OFEV) 150 mg Cap Take 1 capsule (150 mg total) by mouth 2 (two) times a day.    rosuvastatin (CRESTOR) 20 MG tablet Take 1 tablet (20 mg total) by mouth once daily. (Patient taking differently: Take 20 mg by mouth every evening.)    varenicline (CHANTIX) 1 mg Tab Take one-half tablet once a day for 3 days, then increase to one-half tablet twice a day for 4 days. Beginning on Day 8, take 1 tablet twice daily until complete       Review of patient's allergies indicates:   Allergen Reactions    Atorvastatin      Other reaction(s): Rash       Past Medical History:    Diagnosis Date    Adenomatous colon polyp 01/28/2011    Adenomatous colon polyp     Anxiety     Blood transfusion     CHF (congestive heart failure)     Emphysema of lung     Hypertension     Renal disorder     kidney stones    S/P coronary artery stent placement 01/07/2013    Staph skin infection     abd/ lanced x 2    Ulcer      Past Surgical History:   Procedure Laterality Date    COLONOSCOPY  01/28/2011    Dr Choudhary, tubular adenoma    CORONARY ARTERY BYPASS GRAFT  2005    CORONARY STENT PLACEMENT      ENDOBRONCHIAL ULTRASOUND N/A 7/18/2022    Procedure: ENDOBRONCHIAL ULTRASOUND (EBUS);  Surgeon: Alli Sandra MD;  Location: [facility];  Service: Pulmonary;  Laterality: N/A;    NAVIGATIONAL BRONCHOSCOPY Bilateral 7/18/2022    Procedure: BRONCHOSCOPY, NAVIGATIONAL;  Surgeon: Alli Sandra MD;  Location: [facility];  Service: Pulmonary;  Laterality: Bilateral;     Family History       Problem Relation (Age of Onset)    Diabetes Father    Heart disease Mother, Father, Maternal Aunt    No Known Problems Sister, Brother, Daughter, Son          Tobacco Use    Smoking status: Former     Current packs/day: 1.00     Average packs/day: 0.8 packs/day for 47.2 years (36.7 ttl pk-yrs)     Types: Cigarettes     Start date: 5/31/1976    Smokeless tobacco: Never    Tobacco comments:     Pt currently smoking 1ppd. Ready to quit, has a patch on while admitted for cravings. Has made multiple attempts to quit and has started again. Inpatient smoking cessation done 8/9/23. Pt is a [employer] Member: [id number]   Substance and Sexual Activity    Alcohol use: No     Alcohol/week: 0.0 standard drinks of alcohol    Drug use: No    Sexual activity: Not on file     Review of Systems   Constitutional:  Negative for activity change.   Respiratory:  Positive for shortness of breath. Negative for apnea.    Cardiovascular:  Negative for chest pain.   Gastrointestinal:  Positive for abdominal pain. Negative for  constipation, nausea and vomiting.     Objective:     Vital Signs (Most Recent):  Temp: 98 °F (36.7 °C) (08/09/23 1512)  Pulse: 62 (08/09/23 1512)  Resp: 18 (08/09/23 1547)  BP: 108/69 (08/09/23 1512)  SpO2: 95 % (08/09/23 1512) Vital Signs (24h Range):  Temp:  [97.4 °F (36.3 °C)-98.3 °F (36.8 °C)] 98 °F (36.7 °C)  Pulse:  [62-78] 62  Resp:  [18-26] 18  SpO2:  [83 %-97 %] 95 %  BP: ()/(54-75) 108/69     Weight: 81.6 kg (180 lb)  Body mass index is 25.1 kg/m².     Physical Exam  Vitals reviewed.   Cardiovascular:      Rate and Rhythm: Normal rate.      Pulses: Normal pulses.   Pulmonary:      Effort: Pulmonary effort is normal. No respiratory distress.      Breath sounds: No wheezing.   Abdominal:      General: Abdomen is flat. Bowel sounds are normal. There is no distension.      Palpations: There is no mass.      Tenderness: There is abdominal tenderness. There is no guarding.   Neurological:      Mental Status: He is alert.            I have reviewed all pertinent lab results within the past 24 hours.  CBC:   Recent Labs   Lab 08/09/23 0037   WBC 13.47*   RBC 4.13*   HGB 13.6*   HCT 40.4      MCV 98   MCH 32.9*   MCHC 33.7     CMP:   Recent Labs   Lab 08/09/23 0037      CALCIUM 8.5*   ALBUMIN 3.1*   PROT 7.2   *   K 3.5   CO2 22*      BUN 15   CREATININE 0.6   ALKPHOS 79   ALT 8*   AST 11   BILITOT 1.1*       Significant Diagnostics:  Ultrasound and HIDA scan have both been reviewed.  No evidence of cholecystitis.  There is cholelithiasis noted on ultrasound.      Assessment/Plan:     * Abdominal pain  Uncertain as to the etiology of his pain.  I do not believe this is related to cholecystitis.  He may be suffering a bit from biliary dyskinesia.  Given the presence of blood thinners as overall pulmonary status would be very reluctant to proceed with cholecystectomy at the present time.  Would consider elective cholecystectomy if able to stop Plavix for 5 days and if cleared by his  pulmonologist.  If further workup is concerning for cholecystitis would recommend IR cholecystostomy tube.  No further surgical plans are present.  Will sign off.  Reconsult should there be any acute changes in his overall condition.      VTE Risk Mitigation (From admission, onward)         Ordered     enoxaparin injection 80 mg  Every 12 hours         08/09/23 0015                Thank you for your consult. I will sign off. Please contact us if you have any additional questions.    Rudy Boyle MD  General Surgery  [facility]

## 2023-08-09 NOTE — SUBJECTIVE & OBJECTIVE
Interval History: Continued RUQ pain. Dr Boyle feels that he could have biliary colic.    Review of Systems   Constitutional:  Positive for activity change, appetite change, diaphoresis and fatigue.   HENT: Negative.     Eyes: Negative.    Respiratory: Negative.     Cardiovascular: Negative.    Gastrointestinal:  Positive for abdominal distention and abdominal pain. Negative for nausea and vomiting.   Endocrine: Positive for heat intolerance.   Genitourinary: Negative.    Neurological:  Positive for weakness.   Hematological: Negative.    Psychiatric/Behavioral:  The patient is nervous/anxious.      Objective:     Vital Signs (Most Recent):  Temp: 98 °F (36.7 °C) (08/09/23 1512)  Pulse: 62 (08/09/23 1512)  Resp: 18 (08/09/23 1512)  BP: 108/69 (08/09/23 1512)  SpO2: 95 % (08/09/23 1512) Vital Signs (24h Range):  Temp:  [97.4 °F (36.3 °C)-98.3 °F (36.8 °C)] 98 °F (36.7 °C)  Pulse:  [62-78] 62  Resp:  [18-26] 18  SpO2:  [83 %-97 %] 95 %  BP: ()/(54-75) 108/69     Weight: 81.6 kg (180 lb)  Body mass index is 25.1 kg/m².    Intake/Output Summary (Last 24 hours) at 8/9/2023 1544  Last data filed at 8/9/2023 1143  Gross per 24 hour   Intake --   Output 550 ml   Net -550 ml         Physical Exam  Vitals and nursing note reviewed.   Constitutional:       General: He is not in acute distress.  HENT:      Head: Normocephalic and atraumatic.      Nose: Nose normal.      Mouth/Throat:      Mouth: Mucous membranes are moist.   Eyes:      Extraocular Movements: Extraocular movements intact.      Pupils: Pupils are equal, round, and reactive to light.   Cardiovascular:      Rate and Rhythm: Normal rate and regular rhythm.   Pulmonary:      Effort: Pulmonary effort is normal.      Breath sounds: Normal breath sounds.   Abdominal:      General: There is distension.      Tenderness: There is abdominal tenderness. There is guarding. There is no rebound.   Musculoskeletal:         General: Normal range of motion.      Cervical  back: Normal range of motion and neck supple.   Skin:     General: Skin is warm.   Neurological:      Mental Status: He is alert and oriented to person, place, and time.      Motor: Weakness present.   Psychiatric:      Comments: anxious             Significant Labs: All pertinent labs within the past 24 hours have been reviewed.  Recent Lab Results  (Last 5 results in the past 24 hours)        08/09/23  1022   08/09/23  0949   08/09/23  0754   08/09/23  0037   08/08/23  1733        Albumin       3.1         Alkaline Phosphatase       79         ALT       8         Anion Gap       9         Ao root annulus 3.20               Ao peak theron 1.65               Ao VTI 34.30               Appearance, UA     Clear           AST       11         AV valve area 2.52               CESAR by Velocity Ratio 2.39               AORTIC VALVE CUSP SEPERATION 5.20               AV mean gradient 6               AV index (prosthetic) 0.61               AV peak gradient 11               AV Velocity Ratio 0.58               Bacteria, UA     Negative           Baso #       0.03         Basophil %       0.2         Bilirubin (UA)     Negative           BILIRUBIN TOTAL       1.1  Comment: For infants and newborns, interpretation of results should be based  on gestational age, weight and in agreement with clinical  observations.    Premature Infant recommended reference ranges:  Up to 24 hours.............<8.0 mg/dL  Up to 48 hours............<12.0 mg/dL  3-5 days..................<15.0 mg/dL  6-29 days.................<15.0 mg/dL           Blood Culture, Routine       No Growth to date  [P]         BSA 2.02               BUN       15         Calcium       8.5         Chloride       104         CO2       22         Color, UA     Yellow           Creatinine       0.6         Left Ventricle Relative Wall Thickness 0.39               Differential Method       Automated         E/A ratio 0.88               E/E' ratio 6.72               eGFR        >60.0         EF 60               Eos #       0.1         Eosinophil %       0.7         E wave deceleration time 215.00               FS 32               Glucose       102         Glucose, UA     Negative           Gran # (ANC)       11.3         Gran %       83.9         Hematocrit       40.4         Hemoglobin       13.6         Hyaline Casts, UA     6           Immature Grans (Abs)       0.06  Comment: Mild elevation in immature granulocytes is non specific and   can be seen in a variety of conditions including stress response,   acute inflammation, trauma and pregnancy. Correlation with other   laboratory and clinical findings is essential.           Immature Granulocytes       0.4         IVSd 0.93               Ketones, UA     Trace           Lactate, Rayshawn       0.8  Comment: Falsely low lactic acid results can be found in samples   containing >=13.0 mg/dL total bilirubin and/or >=3.5 mg/dL   direct bilirubin.           LA size 3.90               LVOT area 4.2               Leukocytes, UA     Negative           LV LATERAL E/E' RATIO 4.94               LV SEPTAL E/E' RATIO 10.50               LV EDV BP 80.40               LV Diastolic Volume Index 39.80               LVIDd 4.24               LVIDs 2.90               LV mass 116.89               LV Mass Index 58               Left Ventricular Outflow Tract Mean Gradient 2.00               Left Ventricular Outflow Tract Mean Velocity 0.60               LVOT diameter 2.30               LVOT peak theron 0.95               LVOT stroke volume 86.38               LVOT peak VTI 20.80               LV ESV BP 32.20               LV Systolic Volume Index 15.9               Lymph #       1.2         Lymph %       9.2         MCH       32.9         MCHC       33.7         MCV       98         Mean e' 0.13               Microscopic Comment     SEE COMMENT  Comment: Other formed elements not mentioned in the report are not   present in the microscopic examination.               Mono #       0.8         Mono %       5.6         MPV       9.9         MV valve area p 1/2 method 2.93               MV valve area by continuity eq 2.73               MV mean gradient 2               MV peak gradient 4               MV Peak A Gutierrez 0.95               MV Peak E Gutierrez 0.84               MV stenosis pressure 1/2 time 75.00               MV VTI 31.6               NITRITE UA     Negative           nRBC       0         Occult Blood UA     Negative           pH, UA     6.0           Platelets       173         Potassium       3.5         PROTEIN TOTAL       7.2         Protein, UA     1+  Comment: Recommend a 24 hour urine protein or a urine   protein/creatinine ratio if globulin induced proteinuria is  clinically suspected.             Pulmonary Valve Mean Velocity 0.69               PV peak gradient 4               PV PEAK VELOCITY 1.05               Posterior Wall 0.83               Est. RA pres 3               RBC       4.13         RBC, UA     4           RDW       14.0         RV S' 9.72               RV TB RVSP 7               RVDD 3.71               Sodium       135         Specific Gravity, UA     1.020           Specimen UA     Urine, Clean Catch           Squam Epithel, UA     1           TAPSE 1.90               TDI SEPTAL 0.08               TDI LATERAL 0.17               Triscuspid Valve Regurgitation Peak Gradient 65               TR Max Gutierrez 4.02               Troponin I         0.145  Comment: The reference interval for Troponin I represents the 99th percentile   cutoff   for our facility and is consistent with 3rd generation assay   performance.         Troponin I High Sensitivity   42.7  Comment: Troponin results differ between methods. Do not use   results between Troponin methods interchangeably.    Alkaline Phospatase levels above 400 U/L may   cause false positive results.    Access hsTnI should not be used for patients taking   Asfotase nina (Strensiq).       80.5  Comment: Troponin  results differ between methods. Do not use   results between Troponin methods interchangeably.    Alkaline Phospatase levels above 400 U/L may   cause false positive results.    Access hsTnI should not be used for patients taking   Asfotase nina (Strensiq).  Troponin critical result(s) called and verbal readback obtained   from Rudy Badillo RN Cardio B  by MS1 08/09/2023 01:21           TV resting pulmonary artery pressure 68               UROBILINOGEN UA     4.0-6.0           WBC, UA     2           WBC       13.47         ZLVIDD -3.39               ZLVIDS -1.82                                       [P] - Preliminary Result               Significant Imaging: I have reviewed all pertinent imaging results/findings within the past 24 hours.

## 2023-08-09 NOTE — PROGRESS NOTES
Pt currently smoking 1ppd. Ready to quit, has a patch on while admitted for cravings. Has made multiple attempts to quit and has started again. Inpatient smoking cessation done 8/9/23. Pt is a [employer] Member: [id number]

## 2023-08-09 NOTE — H&P
"[facility]    History & Physical      Patient Name: Srinivasa Oliver  MRN: [id number]  Admission Date: 8/8/2023  Attending Physician: Odilon Neves MD   Primary Care Provider: Genaro Womack MD         Patient information was obtained from patient and ER records.     Subjective:     Principal Problem:Abdominal pain    Chief Complaint:   Chief Complaint   Patient presents with    Abdominal Pain     Right side abdominal pain x 2 days         HPI:   67-year-old male with a history of a CABG CHF coronary stents,pulmonary fibrosis on home oxygen who presents to the ER with 3 days of right upper quadrant abdominal pain. The pain got worse with movement and improving with less movement.  No other complaints.  No nausea no vomiting no chest pain or flank pain.  Patient had ultrasound done.  Ultrasound show cholelithiasis.  Looking in the chart I did not see the EKG.  However looking to the ER's note, he mentions " there is no ST elevation but he has ST depression in V2 and V3 concerning for posterior ischemia these changes appear to be new from prior EKGs."  ER note have not been signed yet.  Will Repeat EKG at [facility] .  Patient reports he had no chest pain.  Repeat new lab is pending.  Past Medical History:   Diagnosis Date    Adenomatous colon polyp 01/28/2011    Adenomatous colon polyp     Anxiety     Blood transfusion     CHF (congestive heart failure)     Emphysema of lung     Hypertension     Renal disorder     kidney stones    S/P coronary artery stent placement 01/07/2013    Staph skin infection     abd/ lanced x 2    Ulcer        Past Surgical History:   Procedure Laterality Date    COLONOSCOPY  01/28/2011    Dr Choudhary, tubular adenoma    CORONARY ARTERY BYPASS GRAFT  2005    CORONARY STENT PLACEMENT      ENDOBRONCHIAL ULTRASOUND N/A 7/18/2022    Procedure: ENDOBRONCHIAL ULTRASOUND (EBUS);  Surgeon: Alli Sandra MD;  Location: [facility];  Service: Pulmonary;  Laterality: N/A; "    NAVIGATIONAL BRONCHOSCOPY Bilateral 7/18/2022    Procedure: BRONCHOSCOPY, NAVIGATIONAL;  Surgeon: Alli Sandra MD;  Location: [facility];  Service: Pulmonary;  Laterality: Bilateral;       Review of patient's allergies indicates:   Allergen Reactions    Atorvastatin      Other reaction(s): Rash       No current facility-administered medications on file prior to encounter.     Current Outpatient Medications on File Prior to Encounter   Medication Sig    albuterol (PROVENTIL HFA) 90 mcg/actuation inhaler Inhale 2 puffs into the lungs every 4 (four) hours as needed for Wheezing. Every 4-6 hours PRN    albuterol (PROVENTIL) 2.5 mg /3 mL (0.083 %) nebulizer solution Take 3 mLs (2.5 mg total) by nebulization every 6 (six) hours as needed for Wheezing. Rescue    ascorbic acid, vitamin C, (VITAMIN C) 1000 MG tablet Take 1,000 mg by mouth once daily.    aspirin (ECOTRIN) 81 MG EC tablet Take 81 mg by mouth once daily.    clopidogreL (PLAVIX) 75 mg tablet Take 1 tablet (75 mg total) by mouth once daily. Need office visit before next refill, last seen 1/2019. This will be the LAST Rx if visit is not made.    coenzyme Q10-vitamin E 100-100 mg-unit Cap Take 200 mg by mouth once daily. Every day    DOCOSAHEXANOIC ACID/EPA (FISH OIL ORAL) Take 1 capsule by mouth 2 (two) times daily as needed. Twice a day    EScitalopram oxalate (LEXAPRO) 10 MG tablet Take 1 tablet (10 mg total) by mouth once daily. (Patient taking differently: Take 10 mg by mouth every evening.)    ferrous sulfate (FEOSOL) 325 mg (65 mg iron) Tab tablet Take 325 mg by mouth daily with breakfast.    ipratropium-albuteroL (COMBIVENT RESPIMAT)  mcg/actuation inhaler Inhale 1 puff into the lungs every 6 (six) hours as needed for Wheezing. Rescue    metoprolol succinate (TOPROL-XL) 50 MG 24 hr tablet Take 1 tablet by mouth in the evening    nicotine (NICODERM CQ) 14 mg/24 hr Place 1 patch onto the skin once daily.    nintedanib (OFEV) 150 mg Cap Take 1  capsule (150 mg total) by mouth 2 (two) times a day.    rosuvastatin (CRESTOR) 20 MG tablet Take 1 tablet (20 mg total) by mouth once daily. (Patient taking differently: Take 20 mg by mouth every evening.)    traZODone (DESYREL) 50 MG tablet Take 1 tablet (50 mg total) by mouth every evening. (Patient not taking: Reported on 10/13/2022)    varenicline (CHANTIX) 1 mg Tab Take one-half tablet once a day for 3 days, then increase to one-half tablet twice a day for 4 days. Beginning on Day 8, take 1 tablet twice daily until complete     Family History       Problem Relation (Age of Onset)    Diabetes Father    Heart disease Mother, Father, Maternal Aunt    No Known Problems Sister, Brother, Daughter, Son          Tobacco Use    Smoking status: Every Day     Current packs/day: 0.00     Average packs/day: 0.8 packs/day for 42.0 years (31.5 ttl pk-yrs)     Types: Cigarettes     Start date: 1976     Last attempt to quit: 2018     Years since quittin.1    Smokeless tobacco: Never    Tobacco comments:     patient room 7    Substance and Sexual Activity    Alcohol use: No     Alcohol/week: 0.0 standard drinks of alcohol    Drug use: No    Sexual activity: Not on file     Review of Systems   Constitutional:  Negative for chills and fever.   HENT:  Negative for hearing loss and tinnitus.    Respiratory:  Positive for shortness of breath. Negative for cough, chest tightness and wheezing.         Chronic shortness of breath with hypoxemia on home oxygen   Cardiovascular:  Negative for chest pain and palpitations.   Gastrointestinal:  Positive for abdominal pain. Negative for nausea and vomiting.   Musculoskeletal:  Negative for back pain and neck pain.   Skin:  Negative for rash.   Neurological:  Negative for dizziness and headaches.   Psychiatric/Behavioral:  Negative for hallucinations. The patient is not nervous/anxious.    All other systems reviewed and are negative.    Objective:     Vital Signs (Most  Recent):  Temp: 98 °F (36.7 °C) (08/08/23 2300)  Pulse: 78 (08/08/23 2300)  Resp: 20 (08/08/23 2300)  BP: 114/65 (08/08/23 2300)  SpO2: (!) 83 % (08/08/23 2300) Vital Signs (24h Range):  Temp:  [98 °F (36.7 °C)] 98 °F (36.7 °C)  Pulse:  [] 78  Resp:  [20-26] 20  SpO2:  [83 %-97 %] 83 %  BP: ()/(54-65) 114/65     Weight: 81.6 kg (180 lb)  Body mass index is 25.1 kg/m².    Physical Exam  Constitutional:       General: He is not in acute distress.  HENT:      Head: Normocephalic and atraumatic.      Nose: No congestion.   Eyes:      General: No scleral icterus.        Right eye: No discharge.         Left eye: No discharge.      Extraocular Movements: Extraocular movements intact.   Cardiovascular:      Rate and Rhythm: Normal rate and regular rhythm.   Pulmonary:      Effort: Pulmonary effort is normal.      Breath sounds: Normal breath sounds.      Comments: Crackles bilateral.  Decreased breath sounds  Abdominal:      General: Abdomen is flat. Bowel sounds are normal.      Tenderness: There is abdominal tenderness.      Comments: Right upper quadrant tenderness   Musculoskeletal:         General: No swelling or tenderness.      Cervical back: Normal range of motion and neck supple. No rigidity.   Skin:     General: Skin is warm.   Neurological:      General: No focal deficit present.      Mental Status: He is alert and oriented to person, place, and time.   Psychiatric:         Mood and Affect: Mood normal.            Significant Labs: All pertinent labs within the past 24 hours have been reviewed.  CBC:   Recent Labs   Lab 08/08/23  1259   WBC 18.45*   HGB 14.8   HCT 44.3        CMP:   Recent Labs   Lab 08/08/23  1259   *   K 3.5      CO2 20*   *   BUN 16   CREATININE 1.0   CALCIUM 9.1   PROT 8.0   ALBUMIN 3.1*   BILITOT 0.8   ALKPHOS 94   AST 11   ALT <5*   ANIONGAP 11       Significant Imaging: I have reviewed all pertinent imaging results/findings within the past 24  hours.    Assessment/Plan:     Active Diagnoses:    Diagnosis Date Noted POA    PRINCIPAL PROBLEM:  Abdominal pain [R10.9] 08/08/2023 Yes    NSTEMI (non-ST elevated myocardial infarction) [I21.4] 08/08/2023 Unknown    Paroxysmal atrial fibrillation [I48.0] 09/22/2022 Yes    Lung nodule [R91.1] 07/26/2022 Yes    Pulmonary fibrosis [J84.10] 07/21/2022 Yes    S/P CABG x 3, 2007 [Z95.1] 01/09/2019 Not Applicable    Reactive depression [F32.9] 01/09/2019 Yes    Hyperlipidemia, baseline  [E78.5] 02/08/2016 Yes    Bilateral hearing loss [H91.93] 02/08/2016 Yes    Obesity (BMI 30.0-34.9), today 30.7 [E66.9] 02/05/2016 Yes    Varicose veins of lower extremities with other complications [I83.893] 07/15/2013 Yes    CAD (coronary artery disease) [I25.10] 01/07/2013 Yes    HTN (hypertension) [I10] 01/07/2013 Yes    Tobacco dependence [F17.200] 01/07/2013 Yes      Plan  We will repeat EKG.  And got cardiac enzyme and troponin  Consult cardiology  For leukocytosis.  Check blood culture x2 and UA.  Continue IV antibiotics  We will start the patient on full-dose Lovenox  Consult surgery for right upper quadrant pain with ultrasound so cholelithiasis  We order HIDA scan.  We will check CT scan abdominal  DVT prophylactic  GI prophylactic       VTE Risk Mitigation (From admission, onward)      None              Willie Millan MD  Department of Hospital Medicine   [facility]

## 2023-08-09 NOTE — NURSING
Nurses Note -- 4 Eyes      8/8/2023   11:19 PM      Skin assessed during: Admit      [x] No Altered Skin Integrity Present    []Prevention Measures Documented      [] Yes- Altered Skin Integrity Present or Discovered   [] LDA Added if Not in Epic (Describe Wound)   [] New Altered Skin Integrity was Present on Admit and Documented in LDA   [] Wound Image Taken    Wound Care Consulted? No    Attending Nurse:  tomeka Thompson RN/Staff Member:   [id number]

## 2023-08-09 NOTE — PATIENT INSTRUCTIONS
Pt currently smoking 1ppd. Ready to quit, has a patch on while admitted for cravings. Has made multiple attempts to quit and has started again. Inpatient smoking cessation done 8/9/23. Pt is a [employer] Member: [id number]

## 2023-08-09 NOTE — HPI
This is a pleasant 67-year-old gentleman who was admitted to the hospital overnight with abdominal pain in the right upper quadrant.  Patient notes the pain is been present now for 4 days.  He states that is a pretty persistent pain.  He denies any nausea or vomiting.  He has had no fevers or chills.  On admission he had an ultrasound which did demonstrate cholelithiasis.  There was however no inflammatory changes.  No pericholecystic fluid and no findings consistent with cholecystitis.  He had a HIDA scan performed this morning which demonstrated filling of the gallbladder again with no evidence of cholecystitis.  There were findings suggestive of delayed emptying of the gallbladder consistent with biliary dyskinesia.  He notes he silk and has pain but is asking to eat.  Of note patient has significant coronary artery disease.  He has stents were which were placed in the last year.  He is on Plavix for this.  He also has history of pulmonary fibrosis.  He is reportedly on home oxygen.  No significant abdominal surgical history

## 2023-08-09 NOTE — CONSULTS
"[facility]  Department of Cardiology  Consult Note      PATIENT NAME: Srinivasa Oliver  MRN: [id number]  TODAY'S DATE: 08/09/2023  ADMIT DATE: 8/8/2023                          CONSULT REQUESTED BY: Robin Cardona,*    SUBJECTIVE     PRINCIPAL PROBLEM: Abdominal pain      REASON FOR CONSULT:    Elevated Troponin      HPI:    Patient is a 67-year-old male with past medical history of anxiety, anemia, CHF, hypertension, CAD s/p CABG in 2005 and s/p PCI 2013, emphysema, who presented to the ED with complaints of right upper quadrant abdominal pain x3 days that worsens with movement and improves with rest.  No associated nausea or vomiting or diarrhea.  Denies chest pain or shortness of breath.  Ultrasound shows cholelithiasis.      In ED WBC 18.45, H&H 14.8/44.3, K 3.5, creatinine 1.0, , Troponin I 0.118, >0.145, troponin HS 80.5. CXR shows severe heterogeneous interstitial fibrosis and hypoventilation    ECHO 8/30/23  The estimated PA systolic pressure is 55 mmHg.  There is moderate pulmonary hypertension.  The left ventricle is normal in size with normal systolic function.  The estimated ejection fraction is 55%.  Normal left ventricular diastolic function.  Mild to moderate tricuspid regurgitation.  Mild right ventricular enlargement with mildly reduced right ventricular systolic function.      FROM H&P   Abdominal Pain       Right side abdominal pain x 2 days          HPI:   67-year-old male with a history of a CABG CHF coronary stents,pulmonary fibrosis on home oxygen who presents to the ER with 3 days of right upper quadrant abdominal pain. The pain got worse with movement and improving with less movement.  No other complaints.  No nausea no vomiting no chest pain or flank pain.  Patient had ultrasound done.  Ultrasound show cholelithiasis.  Looking in the chart I did not see the EKG.  However looking to the ER's note, he mentions " there is no ST elevation but he has ST depression in " "V2 and V3 concerning for posterior ischemia these changes appear to be new from prior EKGs."  ER note have not been signed yet.  Will Repeat EKG at [facility] .  Patient reports he had no chest pain.  Repeat new lab is pending.       Past Medical History:         Review of patient's allergies indicates:   Allergen Reactions    Atorvastatin      Other reaction(s): Rash       Past Medical History:   Diagnosis Date    Adenomatous colon polyp 2011    Adenomatous colon polyp     Anxiety     Blood transfusion     CHF (congestive heart failure)     Emphysema of lung     Hypertension     Renal disorder     kidney stones    S/P coronary artery stent placement 2013    Staph skin infection     abd/ lanced x 2    Ulcer      Past Surgical History:   Procedure Laterality Date    COLONOSCOPY  2011    Dr Choudhary, tubular adenoma    CORONARY ARTERY BYPASS GRAFT      CORONARY STENT PLACEMENT      ENDOBRONCHIAL ULTRASOUND N/A 2022    Procedure: ENDOBRONCHIAL ULTRASOUND (EBUS);  Surgeon: Alli Sandra MD;  Location: [facility];  Service: Pulmonary;  Laterality: N/A;    NAVIGATIONAL BRONCHOSCOPY Bilateral 2022    Procedure: BRONCHOSCOPY, NAVIGATIONAL;  Surgeon: Alli Sandra MD;  Location: [facility];  Service: Pulmonary;  Laterality: Bilateral;     Social History     Tobacco Use    Smoking status: Every Day     Current packs/day: 0.00     Average packs/day: 0.8 packs/day for 42.0 years (31.5 ttl pk-yrs)     Types: Cigarettes     Start date: 1976     Last attempt to quit: 2018     Years since quittin.1    Smokeless tobacco: Never    Tobacco comments:     patient room 7    Substance Use Topics    Alcohol use: No     Alcohol/week: 0.0 standard drinks of alcohol    Drug use: No        REVIEW OF SYSTEMS    As mentioned in HPI    OBJECTIVE     VITAL SIGNS (Most Recent)  Temp: 97.8 °F (36.6 °C) (23)  Pulse: 69 (23)  Resp: 18 (23)  BP: 106/64 " (08/09/23 0725)  SpO2: (!) 93 % (08/09/23 0728)    VENTILATION STATUS  Resp: 18 (08/09/23 0726)  SpO2: (!) 93 % (08/09/23 0728)           I & O (Last 24H):  Intake/Output Summary (Last 24 hours) at 8/9/2023 0926  Last data filed at 8/8/2023 2311  Gross per 24 hour   Intake 1000 ml   Output 200 ml   Net 800 ml       WEIGHTS  Wt Readings from Last 3 Encounters:   08/08/23 1123 81.6 kg (180 lb)   09/22/22 1445 77 kg (169 lb 12.1 oz)   09/22/22 1121 76.9 kg (169 lb 8.5 oz)       PHYSICAL EXAM    CONSTITUTIONAL: NAD  HEENT: Normocephalic. No pallor  NECK: no JVD  LUNGS: CTA b/l  HEART: regular rate and rhythm, S1, S2 normal, no murmur   ABDOMEN: bowel sounds normal; RUQ TTP  EXTREMITIES: No edema  SKIN: No rash  NEURO: AAO X 3  PSYCH: normal affect      HOME MEDICATIONS:  No current facility-administered medications on file prior to encounter.     Current Outpatient Medications on File Prior to Encounter   Medication Sig Dispense Refill    traZODone (DESYREL) 50 MG tablet Take 1 tablet (50 mg total) by mouth every evening. 90 tablet 3    albuterol (PROVENTIL HFA) 90 mcg/actuation inhaler Inhale 2 puffs into the lungs every 4 (four) hours as needed for Wheezing. Every 4-6 hours PRN 18 g 6    albuterol (PROVENTIL) 2.5 mg /3 mL (0.083 %) nebulizer solution Take 3 mLs (2.5 mg total) by nebulization every 6 (six) hours as needed for Wheezing. Rescue 120 mL 11    ascorbic acid, vitamin C, (VITAMIN C) 1000 MG tablet Take 1,000 mg by mouth once daily.      aspirin (ECOTRIN) 81 MG EC tablet Take 81 mg by mouth once daily.      clopidogreL (PLAVIX) 75 mg tablet Take 1 tablet (75 mg total) by mouth once daily. Need office visit before next refill, last seen 1/2019. This will be the LAST Rx if visit is not made. 90 tablet 3    coenzyme Q10-vitamin E 100-100 mg-unit Cap Take 200 mg by mouth once daily. Every day      DOCOSAHEXANOIC ACID/EPA (FISH OIL ORAL) Take 1 capsule by mouth 2 (two) times daily as needed. Twice a day       EScitalopram oxalate (LEXAPRO) 10 MG tablet Take 1 tablet (10 mg total) by mouth once daily. (Patient taking differently: Take 10 mg by mouth every evening.) 90 tablet 3    ferrous sulfate (FEOSOL) 325 mg (65 mg iron) Tab tablet Take 325 mg by mouth daily with breakfast.      ipratropium-albuteroL (COMBIVENT RESPIMAT)  mcg/actuation inhaler Inhale 1 puff into the lungs every 6 (six) hours as needed for Wheezing. Rescue      metoprolol succinate (TOPROL-XL) 50 MG 24 hr tablet Take 1 tablet by mouth in the evening 90 tablet 0    nicotine (NICODERM CQ) 14 mg/24 hr Place 1 patch onto the skin once daily. 28 patch 0    nintedanib (OFEV) 150 mg Cap Take 1 capsule (150 mg total) by mouth 2 (two) times a day. 60 capsule 11    rosuvastatin (CRESTOR) 20 MG tablet Take 1 tablet (20 mg total) by mouth once daily. (Patient taking differently: Take 20 mg by mouth every evening.) 90 tablet 3    varenicline (CHANTIX) 1 mg Tab Take one-half tablet once a day for 3 days, then increase to one-half tablet twice a day for 4 days. Beginning on Day 8, take 1 tablet twice daily until complete 56 tablet 0       SCHEDULED MEDS:   albuterol-ipratropium  3 mL Nebulization Q6H    ascorbic acid (vitamin C)  1,000 mg Oral Daily    aspirin  81 mg Oral Daily    atorvastatin  40 mg Oral QHS    clopidogreL  75 mg Oral Daily    enoxparin  1 mg/kg Subcutaneous Q12H (prophylaxis, 0900/2100)    EScitalopram oxalate  10 mg Oral Daily    ferrous sulfate  1 tablet Oral BID    metoprolol succinate  50 mg Oral QHS    nicotine  1 patch Transdermal Daily    traZODone  50 mg Oral QHS    varenicline  1 mg Oral Daily       CONTINUOUS INFUSIONS:    PRN MEDS:albuterol    LABS AND DIAGNOSTICS     CBC LAST 3 DAYS  Recent Labs   Lab 08/08/23  1259 08/09/23  0037   WBC 18.45* 13.47*   RBC 4.49* 4.13*   HGB 14.8 13.6*   HCT 44.3 40.4   MCV 99* 98   MCH 33.0* 32.9*   MCHC 33.4 33.7   RDW 13.7 14.0    173   MPV 10.0 9.9   GRAN 88.8*  16.4* 83.9*  11.3*  "  LYMPH 5.0*  0.9* 9.2*  1.2   MONO 5.0  0.9 5.6  0.8   BASO 0.05 0.03   NRBC 0 0       COAGULATION LAST 3 DAYS  No results for input(s): "LABPT", "INR", "APTT" in the last 168 hours.    CHEMISTRY LAST 3 DAYS  Recent Labs   Lab 08/08/23  1259 08/09/23  0037   * 135*   K 3.5 3.5    104   CO2 20* 22*   ANIONGAP 11 9   BUN 16 15   CREATININE 1.0 0.6   * 102   CALCIUM 9.1 8.5*   ALBUMIN 3.1* 3.1*   PROT 8.0 7.2   ALKPHOS 94 79   ALT <5* 8*   AST 11 11   BILITOT 0.8 1.1*       CARDIAC PROFILE LAST 3 DAYS  Recent Labs   Lab 08/08/23  1259 08/08/23  1733 08/09/23  0037   *  --   --    TROPONINI 0.118* 0.145*  --    TROPONINIHS  --   --  80.5*       ENDOCRINE LAST 3 DAYS  No results for input(s): "TSH", "PROCAL" in the last 168 hours.    LAST ARTERIAL BLOOD GAS  ABG  No results for input(s): "PH", "PO2", "PCO2", "HCO3", "BE" in the last 168 hours.    LAST 7 DAYS MICROBIOLOGY   Microbiology Results (last 7 days)       Procedure Component Value Units Date/Time    Blood culture [[id number]] Collected: 08/09/23 0037    Order Status: Completed Specimen: Blood from Peripheral, Right Hand Updated: 08/09/23 0717     Blood Culture, Routine No Growth to date    Narrative:      Blood culture x 2            MOST RECENT IMAGING  US Abdomen Limited  Narrative: EXAMINATION:  US ABDOMEN LIMITED    CLINICAL HISTORY:  ruq pain;    TECHNIQUE:  Limited ultrasound of the right upper quadrant of the abdomen (including pancreas, liver, gallbladder, common bile duct, and right kidney) was performed.    COMPARISON:  None.    FINDINGS:  The pancreas is obscured.  The IVC is normal caliber.  Numerous gallstones are present, measuring up to 17 mm.  No gallbladder wall thickening or pericholecystic fluid.  Sonographic Catalan sign is negative.  The common bile duct is normal in caliber at 4 mm.  The liver is upper normal in size at 17.5 cm.  There is no focal hepatic lesion.  The right kidney measures 10.9 cm in length and " demonstrates a 1.3 cm simple cyst of its upper pole.  Impression: Cholelithiasis.    Small right renal simple cyst.    Electronically signed by: Toño Silva MD  Date:    08/08/2023  Time:    14:23  X-Ray Chest 1 View  Narrative: EXAMINATION:  XR CHEST 1 VIEW    CLINICAL HISTORY:  Shortness of breath    TECHNIQUE:  Single frontal view of the chest was performed.    COMPARISON:  Chest x-ray of August 30, 2022 and July 18, 2022, CT chest of September 21, 2022.    FINDINGS:  The patient has had a prior sternotomy.  The mediastinal and cardiac size and contour within normal limits.  There is worsening severe interstitial fibrosis now patchy with hypoventilation.  No pneumothorax or pleural effusion is seen.  Impression: Severe heterogeneous interstitial fibrosis and hypoventilation.  Prior sternotomy.    Electronically signed by: Barak Shultz MD  Date:    08/08/2023  Time:    13:39      ECHOCARDIOGRAM RESULTS (last 5)  Results for orders placed in visit on 08/30/22    Echo    Interpretation Summary  · The estimated PA systolic pressure is 55 mmHg.  · There is moderate pulmonary hypertension.  · The left ventricle is normal in size with normal systolic function.  · The estimated ejection fraction is 55%.  · Normal left ventricular diastolic function.  · Mild to moderate tricuspid regurgitation.  · Mild right ventricular enlargement with mildly reduced right ventricular systolic function.      Results for orders placed during the hospital encounter of 01/15/19    STRESS TEST REPORT      Transthoracic echo (TTE) complete (Cupid Only)    Interpretation Summary  · Normal left ventricular size, wall motion and systolic function. The estimated ejection fraction is 60%. Grade 1 diastolic dysfunction.  · Normal right ventricular size and systolic function.  · No significant valvular disorder observed.  · Mild elevated PA systolic pressure, 43 mm Hg by calculation.      CURRENT/PREVIOUS VISIT EKG  Results for orders  placed or performed during the hospital encounter of 08/08/23   EKG 12-lead    Collection Time: 08/08/23 11:37 PM    Narrative    Test Reason : I21.4,    Vent. Rate : 068 BPM     Atrial Rate : 068 BPM     P-R Int : 130 ms          QRS Dur : 088 ms      QT Int : 424 ms       P-R-T Axes : 033 027 035 degrees     QTc Int : 450 ms    Sinus rhythm with Premature atrial complexes with Aberrant conduction  Possible Left atrial enlargement  Nonspecific ST abnormality  Abnormal ECG  When compared with ECG of 08-AUG-2023 13:20,  Aberrant conduction is now Present  T wave inversion less evident in Anterior leads    Referred By: [email or web address]   SELF           Confirmed By:            ASSESSMENT/PLAN:     Active Hospital Problems    Diagnosis    *Abdominal pain    NSTEMI (non-ST elevated myocardial infarction)    Paroxysmal atrial fibrillation    Lung nodule    Pulmonary fibrosis    S/P CABG x 3, 2007    Reactive depression    Hyperlipidemia, baseline     Bilateral hearing loss    Obesity (BMI 30.0-34.9), today 30.7    Varicose veins of lower extremities with other complications     Dx updated per 2019 IMO Load      CAD (coronary artery disease)    HTN (hypertension)    Tobacco dependence       ASSESSMENT & PLAN:     Elevated Troponin HS  Acute Abdominal Pain  Cholelithiasis  Leukocytosis  CAD S/P CABG x 3 and PCI  HTN  Obesity   Tobacco Abuse  HLD  Pulmonary Fibrosis      RECOMMENDATIONS:    Patient presented to the ED with acute RUQ abdominal pain.  US abdomen showed cholelithiasis.  Leukocytosis this admission.  Elevated troponin HS. T wave inversion noted in anterior leads.  Patient denies CP.  History of CAD, s/p CABG and subsequent PCI.   Obtain echo.  Stress test next am. NPO at midnight.   Thank you for the consultation.  We will continue to follow.       Shanika De Guzman NP  Department of Cardiology  Date of Service: 08/09/2023        I have personally interviewed and examined the patient, I have reviewed the Nurse  Practitioner's history and physical, assessment, and plan. I agree with the findings and plan.    Patient admitted with right upper quadrant pain has a history of coronary bypass and PCI.  He has no cardiac symptoms but his EKG shows some minor ST depression in anterior leads and he was transferred to this facility for cardiac evaluation his troponin is slightly elevated which is not clinically significant.  He needs to stop his Plavix for possible cholecystectomy and will perform a stress test in the morning and hold the Plavix at this time  Aaron Gutiérrez M.D.  Department of Cardiology  Date of Service: 08/09/2023  9:26 AM

## 2023-08-09 NOTE — SUBJECTIVE & OBJECTIVE
No current facility-administered medications on file prior to encounter.     Current Outpatient Medications on File Prior to Encounter   Medication Sig    traZODone (DESYREL) 50 MG tablet Take 1 tablet (50 mg total) by mouth every evening.    albuterol (PROVENTIL HFA) 90 mcg/actuation inhaler Inhale 2 puffs into the lungs every 4 (four) hours as needed for Wheezing. Every 4-6 hours PRN    albuterol (PROVENTIL) 2.5 mg /3 mL (0.083 %) nebulizer solution Take 3 mLs (2.5 mg total) by nebulization every 6 (six) hours as needed for Wheezing. Rescue    ascorbic acid, vitamin C, (VITAMIN C) 1000 MG tablet Take 1,000 mg by mouth once daily.    aspirin (ECOTRIN) 81 MG EC tablet Take 81 mg by mouth once daily.    clopidogreL (PLAVIX) 75 mg tablet Take 1 tablet (75 mg total) by mouth once daily. Need office visit before next refill, last seen 1/2019. This will be the LAST Rx if visit is not made.    coenzyme Q10-vitamin E 100-100 mg-unit Cap Take 200 mg by mouth once daily. Every day    DOCOSAHEXANOIC ACID/EPA (FISH OIL ORAL) Take 1 capsule by mouth 2 (two) times daily as needed. Twice a day    EScitalopram oxalate (LEXAPRO) 10 MG tablet Take 1 tablet (10 mg total) by mouth once daily. (Patient taking differently: Take 10 mg by mouth every evening.)    ferrous sulfate (FEOSOL) 325 mg (65 mg iron) Tab tablet Take 325 mg by mouth daily with breakfast.    ipratropium-albuteroL (COMBIVENT RESPIMAT)  mcg/actuation inhaler Inhale 1 puff into the lungs every 6 (six) hours as needed for Wheezing. Rescue    metoprolol succinate (TOPROL-XL) 50 MG 24 hr tablet Take 1 tablet by mouth in the evening    nicotine (NICODERM CQ) 14 mg/24 hr Place 1 patch onto the skin once daily.    nintedanib (OFEV) 150 mg Cap Take 1 capsule (150 mg total) by mouth 2 (two) times a day.    rosuvastatin (CRESTOR) 20 MG tablet Take 1 tablet (20 mg total) by mouth once daily. (Patient taking differently: Take 20 mg by mouth every evening.)    varenicline  (CHANTIX) 1 mg Tab Take one-half tablet once a day for 3 days, then increase to one-half tablet twice a day for 4 days. Beginning on Day 8, take 1 tablet twice daily until complete       Review of patient's allergies indicates:   Allergen Reactions    Atorvastatin      Other reaction(s): Rash       Past Medical History:   Diagnosis Date    Adenomatous colon polyp 01/28/2011    Adenomatous colon polyp     Anxiety     Blood transfusion     CHF (congestive heart failure)     Emphysema of lung     Hypertension     Renal disorder     kidney stones    S/P coronary artery stent placement 01/07/2013    Staph skin infection     abd/ lanced x 2    Ulcer      Past Surgical History:   Procedure Laterality Date    COLONOSCOPY  01/28/2011    Dr Choudhary, tubular adenoma    CORONARY ARTERY BYPASS GRAFT  2005    CORONARY STENT PLACEMENT      ENDOBRONCHIAL ULTRASOUND N/A 7/18/2022    Procedure: ENDOBRONCHIAL ULTRASOUND (EBUS);  Surgeon: Alli Sandra MD;  Location: [facility];  Service: Pulmonary;  Laterality: N/A;    NAVIGATIONAL BRONCHOSCOPY Bilateral 7/18/2022    Procedure: BRONCHOSCOPY, NAVIGATIONAL;  Surgeon: Alli Sandra MD;  Location: [facility];  Service: Pulmonary;  Laterality: Bilateral;     Family History       Problem Relation (Age of Onset)    Diabetes Father    Heart disease Mother, Father, Maternal Aunt    No Known Problems Sister, Brother, Daughter, Son          Tobacco Use    Smoking status: Former     Current packs/day: 1.00     Average packs/day: 0.8 packs/day for 47.2 years (36.7 ttl pk-yrs)     Types: Cigarettes     Start date: 5/31/1976    Smokeless tobacco: Never    Tobacco comments:     Pt currently smoking 1ppd. Ready to quit, has a patch on while admitted for cravings. Has made multiple attempts to quit and has started again. Inpatient smoking cessation done 8/9/23. Pt is a [employer] Member: [id number]   Substance and Sexual Activity    Alcohol use: No     Alcohol/week: 0.0 standard drinks of alcohol     Drug use: No    Sexual activity: Not on file     Review of Systems   Constitutional:  Negative for activity change.   Respiratory:  Positive for shortness of breath. Negative for apnea.    Cardiovascular:  Negative for chest pain.   Gastrointestinal:  Positive for abdominal pain. Negative for constipation, nausea and vomiting.     Objective:     Vital Signs (Most Recent):  Temp: 98 °F (36.7 °C) (08/09/23 1512)  Pulse: 62 (08/09/23 1512)  Resp: 18 (08/09/23 1547)  BP: 108/69 (08/09/23 1512)  SpO2: 95 % (08/09/23 1512) Vital Signs (24h Range):  Temp:  [97.4 °F (36.3 °C)-98.3 °F (36.8 °C)] 98 °F (36.7 °C)  Pulse:  [62-78] 62  Resp:  [18-26] 18  SpO2:  [83 %-97 %] 95 %  BP: ()/(54-75) 108/69     Weight: 81.6 kg (180 lb)  Body mass index is 25.1 kg/m².     Physical Exam  Vitals reviewed.   Cardiovascular:      Rate and Rhythm: Normal rate.      Pulses: Normal pulses.   Pulmonary:      Effort: Pulmonary effort is normal. No respiratory distress.      Breath sounds: No wheezing.   Abdominal:      General: Abdomen is flat. Bowel sounds are normal. There is no distension.      Palpations: There is no mass.      Tenderness: There is abdominal tenderness. There is no guarding.   Neurological:      Mental Status: He is alert.            I have reviewed all pertinent lab results within the past 24 hours.  CBC:   Recent Labs   Lab 08/09/23 0037   WBC 13.47*   RBC 4.13*   HGB 13.6*   HCT 40.4      MCV 98   MCH 32.9*   MCHC 33.7     CMP:   Recent Labs   Lab 08/09/23 0037      CALCIUM 8.5*   ALBUMIN 3.1*   PROT 7.2   *   K 3.5   CO2 22*      BUN 15   CREATININE 0.6   ALKPHOS 79   ALT 8*   AST 11   BILITOT 1.1*       Significant Diagnostics:  Ultrasound and HIDA scan have both been reviewed.  No evidence of cholecystitis.  There is cholelithiasis noted on ultrasound.

## 2023-08-10 ENCOUNTER — CLINICAL SUPPORT (OUTPATIENT)
Dept: CARDIOLOGY | Facility: HOSPITAL | Age: 68
DRG: 392 | End: 2023-08-10
Attending: EMERGENCY MEDICINE
Payer: MEDICARE

## 2023-08-10 LAB
CV PHARM DOSE: 0.4 MG
CV STRESS BASE HR: 72 BPM
DIASTOLIC BLOOD PRESSURE: 58 MMHG
OHS CV CPX 1 MINUTE RECOVERY HEART RATE: 98 BPM
OHS CV CPX 85 PERCENT MAX PREDICTED HEART RATE MALE: 128
OHS CV CPX MAX PREDICTED HEART RATE: 151
OHS CV CPX PATIENT IS FEMALE: 0
OHS CV CPX PATIENT IS MALE: 1
OHS CV CPX PEAK DIASTOLIC BLOOD PRESSURE: 58 MMHG
OHS CV CPX PEAK HEAR RATE: 103 BPM
OHS CV CPX PEAK RATE PRESSURE PRODUCT: NORMAL
OHS CV CPX PEAK SYSTOLIC BLOOD PRESSURE: 111 MMHG
OHS CV CPX PERCENT MAX PREDICTED HEART RATE ACHIEVED: 68
OHS CV CPX RATE PRESSURE PRODUCT PRESENTING: 7488
SYSTOLIC BLOOD PRESSURE: 104 MMHG

## 2023-08-10 PROCEDURE — 93017 CV STRESS TEST TRACING ONLY: CPT

## 2023-08-10 PROCEDURE — 93016 CV STRESS TEST SUPVJ ONLY: CPT | Mod: ,,, | Performed by: GENERAL PRACTICE

## 2023-08-10 PROCEDURE — 99233 SBSQ HOSP IP/OBS HIGH 50: CPT | Mod: 25,,, | Performed by: GENERAL PRACTICE

## 2023-08-10 PROCEDURE — 21400001 HC TELEMETRY ROOM

## 2023-08-10 PROCEDURE — S4991 NICOTINE PATCH NONLEGEND: HCPCS | Performed by: INTERNAL MEDICINE

## 2023-08-10 PROCEDURE — 94761 N-INVAS EAR/PLS OXIMETRY MLT: CPT

## 2023-08-10 PROCEDURE — 99233 PR SUBSEQUENT HOSPITAL CARE,LEVL III: ICD-10-PCS | Mod: 25,,, | Performed by: GENERAL PRACTICE

## 2023-08-10 PROCEDURE — 93018 CV STRESS TEST I&R ONLY: CPT | Mod: ,,, | Performed by: GENERAL PRACTICE

## 2023-08-10 PROCEDURE — 99900031 HC PATIENT EDUCATION (STAT)

## 2023-08-10 PROCEDURE — 94640 AIRWAY INHALATION TREATMENT: CPT

## 2023-08-10 PROCEDURE — 25000242 PHARM REV CODE 250 ALT 637 W/ HCPCS: Performed by: INTERNAL MEDICINE

## 2023-08-10 PROCEDURE — 93016 NUCLEAR STRESS TEST (CUPID ONLY): ICD-10-PCS | Mod: ,,, | Performed by: GENERAL PRACTICE

## 2023-08-10 PROCEDURE — 25000003 PHARM REV CODE 250: Performed by: INTERNAL MEDICINE

## 2023-08-10 PROCEDURE — 63600175 PHARM REV CODE 636 W HCPCS: Performed by: INTERNAL MEDICINE

## 2023-08-10 PROCEDURE — 27000221 HC OXYGEN, UP TO 24 HOURS

## 2023-08-10 PROCEDURE — 25000003 PHARM REV CODE 250: Performed by: STUDENT IN AN ORGANIZED HEALTH CARE EDUCATION/TRAINING PROGRAM

## 2023-08-10 PROCEDURE — 63600175 PHARM REV CODE 636 W HCPCS: Performed by: STUDENT IN AN ORGANIZED HEALTH CARE EDUCATION/TRAINING PROGRAM

## 2023-08-10 PROCEDURE — 99900035 HC TECH TIME PER 15 MIN (STAT)

## 2023-08-10 PROCEDURE — 93018 NUCLEAR STRESS TEST (CUPID ONLY): ICD-10-PCS | Mod: ,,, | Performed by: GENERAL PRACTICE

## 2023-08-10 PROCEDURE — 94799 UNLISTED PULMONARY SVC/PX: CPT

## 2023-08-10 RX ORDER — REGADENOSON 0.08 MG/ML
0.4 INJECTION, SOLUTION INTRAVENOUS
Status: COMPLETED | OUTPATIENT
Start: 2023-08-10 | End: 2023-08-10

## 2023-08-10 RX ORDER — LACTULOSE 10 G/15ML
15 SOLUTION ORAL 3 TIMES DAILY
Status: DISCONTINUED | OUTPATIENT
Start: 2023-08-11 | End: 2023-08-11

## 2023-08-10 RX ORDER — LACTULOSE 10 G/15ML
15 SOLUTION ORAL
Status: DISPENSED | OUTPATIENT
Start: 2023-08-10 | End: 2023-08-10

## 2023-08-10 RX ADMIN — IPRATROPIUM BROMIDE AND ALBUTEROL SULFATE 3 ML: 2.5; .5 SOLUTION RESPIRATORY (INHALATION) at 08:08

## 2023-08-10 RX ADMIN — NICOTINE 1 PATCH: 14 PATCH, EXTENDED RELEASE TRANSDERMAL at 12:08

## 2023-08-10 RX ADMIN — IPRATROPIUM BROMIDE AND ALBUTEROL SULFATE 3 ML: 2.5; .5 SOLUTION RESPIRATORY (INHALATION) at 01:08

## 2023-08-10 RX ADMIN — LACTULOSE 15 G: 20 SOLUTION ORAL at 05:08

## 2023-08-10 RX ADMIN — OXYCODONE HYDROCHLORIDE AND ACETAMINOPHEN 1000 MG: 500 TABLET ORAL at 09:08

## 2023-08-10 RX ADMIN — ESCITALOPRAM OXALATE 10 MG: 10 TABLET ORAL at 09:08

## 2023-08-10 RX ADMIN — ENOXAPARIN SODIUM 80 MG: 100 INJECTION SUBCUTANEOUS at 08:08

## 2023-08-10 RX ADMIN — MORPHINE SULFATE 2 MG: 2 INJECTION, SOLUTION INTRAMUSCULAR; INTRAVENOUS at 11:08

## 2023-08-10 RX ADMIN — NICOTINE 1 PATCH: 14 PATCH, EXTENDED RELEASE TRANSDERMAL at 09:08

## 2023-08-10 RX ADMIN — TRAZODONE HYDROCHLORIDE 50 MG: 50 TABLET ORAL at 08:08

## 2023-08-10 RX ADMIN — PIPERACILLIN AND TAZOBACTAM 3.38 G: 3; .375 INJECTION, POWDER, LYOPHILIZED, FOR SOLUTION INTRAVENOUS; PARENTERAL at 04:08

## 2023-08-10 RX ADMIN — PIPERACILLIN AND TAZOBACTAM 3.38 G: 3; .375 INJECTION, POWDER, LYOPHILIZED, FOR SOLUTION INTRAVENOUS; PARENTERAL at 01:08

## 2023-08-10 RX ADMIN — PIPERACILLIN AND TAZOBACTAM 3.38 G: 3; .375 INJECTION, POWDER, LYOPHILIZED, FOR SOLUTION INTRAVENOUS; PARENTERAL at 09:08

## 2023-08-10 RX ADMIN — FERROUS SULFATE TAB 325 MG (65 MG ELEMENTAL FE) 1 EACH: 325 (65 FE) TAB at 09:08

## 2023-08-10 RX ADMIN — LACTULOSE 15 G: 20 SOLUTION ORAL at 08:08

## 2023-08-10 RX ADMIN — FERROUS SULFATE TAB 325 MG (65 MG ELEMENTAL FE) 1 EACH: 325 (65 FE) TAB at 08:08

## 2023-08-10 RX ADMIN — METOPROLOL SUCCINATE 50 MG: 50 TABLET, FILM COATED, EXTENDED RELEASE ORAL at 08:08

## 2023-08-10 RX ADMIN — ENOXAPARIN SODIUM 80 MG: 100 INJECTION SUBCUTANEOUS at 10:08

## 2023-08-10 RX ADMIN — VARENICLINE TARTRATE 1 MG: 0.5 TABLET, FILM COATED ORAL at 09:08

## 2023-08-10 RX ADMIN — REGADENOSON 0.4 MG: 0.08 INJECTION, SOLUTION INTRAVENOUS at 09:08

## 2023-08-10 NOTE — PLAN OF CARE
Formerly [facility], [facility]  Initial Discharge Assessment       Primary Care Provider: Genaro Womack MD    Admission Diagnosis: NSTEMI (non-ST elevated myocardial infarction) [I21.4]  Tachycardia [R00.0]    Admission Date: 8/8/2023  Expected Discharge Date: 8/11/2023     met with Pt at bedside to complete discharge assessment. Pt AAOx4s. Demographics, PCP, and insurance verified. Pt lives with spouse. No home health. No dialysis. Pt reports ability to complete ADLs with the assistance of oxygen (cannot recall name of company), rolling walker. Pt verbalized plan to discharge home via family transport. Pt has no other needs to be addressed at this time.    Transition of Care Barriers: None    Payor: [employer] MANAGED MEDICARE / Plan: [employer] MEDICARE HMO / Product Type: Capitation /     Extended Emergency Contact Information  Primary Emergency Contact: Beatris Oliver  Address: [address] [place] [address] [place] [address] [facility]  Home Phone: [phone]  Mobile Phone: [phone]  Relation: Spouse  Preferred language: English   needed? No    Discharge Plan A: Home with family  Discharge Plan B: Home with family      [facility] [place] - [address]  [address] [facility]  [place] [address]  Phone: [phone] Fax: [phone]      Initial Assessment (most recent)       Adult Discharge Assessment - 08/10/23 1127          Discharge Assessment    Assessment Type Discharge Planning Assessment     Confirmed/corrected address, phone number and insurance Yes     Confirmed Demographics Correct on Facesheet     Source of Information patient     Does patient/caregiver understand observation status Yes     Communicated BAY with patient/caregiver Yes     Reason For Admission Abdominal pain     People in Home spouse;other relative(s)   stepson    Facility Arrived From: Home     Do you expect to return to your current living situation? Yes     Do you have help at  home or someone to help you manage your care at home? Yes     Who are your caregiver(s) and their phone number(s)? Beatris Oliver (Spouse)   [phone] (Mobile)     Prior to hospitilization cognitive status: Alert/Oriented     Current cognitive status: Alert/Oriented     Walking or Climbing Stairs ambulation difficulty, requires equipment;stair climbing difficulty, requires equipment;transferring difficulty, requires equipment     Mobility Management Oxygen, rolling walker     Home Accessibility wheelchair accessible     Home Layout Able to live on 1st floor     Equipment Currently Used at Home oxygen;walker, rolling     Readmission within 30 days? No     Patient currently being followed by outpatient case management? No     Do you currently have service(s) that help you manage your care at home? No     Do you take prescription medications? Yes     Do you have prescription coverage? Yes     Coverage Payor:  [employer] MANAGED MEDICARE - [employer] MED[employer]     Do you have any problems affording any of your prescribed medications? No     Who is going to help you get home at discharge? Beatris Oliver (Spouse)   [phone] (Mobile)     How do you get to doctors appointments? family or friend will provide;car, drives self     Are you on dialysis? No     Do you take coumadin? No     Discharge Plan A Home with family     Discharge Plan B Home with family     DME Needed Upon Discharge  none     Discharge Plan discussed with: Patient     Transition of Care Barriers None        OTHER    Name(s) of People in Home Beatris Oliver (Spouse)   [phone] (Mobile)

## 2023-08-10 NOTE — PROGRESS NOTES
[facility] hospital Medicine  Progress Note    Patient Name: Srinivasa Oliver  MRN: [id number]  Patient Class: IP- Inpatient   Admission Date: 8/8/2023  Length of Stay: 2 days  Attending Physician: Abdirizak Edwards MD  Primary Care Provider: Genaro Womack MD    This is the 8/9/2023 note note    Subjective:     Principal Problem:Abdominal pain        HPI:  No notes on file    Overview/Hospital Course:  8/9/2023  Pt continues to have 10/10 RUQ pain without nausea vomiting fever or chills  No hematemesis melena or hematochezia      Interval History: Continued RUQ pain. Dr Boyle feels that he could have biliary colic.    Review of Systems   Constitutional:  Positive for activity change, appetite change, diaphoresis and fatigue.   HENT: Negative.     Eyes: Negative.    Respiratory: Negative.     Cardiovascular: Negative.    Gastrointestinal:  Positive for abdominal distention and abdominal pain. Negative for nausea and vomiting.   Endocrine: Positive for heat intolerance.   Genitourinary: Negative.    Neurological:  Positive for weakness.   Hematological: Negative.    Psychiatric/Behavioral:  The patient is nervous/anxious.      Objective:     Vital Signs (Most Recent):  Temp: 98 °F (36.7 °C) (08/09/23 1512)  Pulse: 62 (08/09/23 1512)  Resp: 18 (08/09/23 1512)  BP: 108/69 (08/09/23 1512)  SpO2: 95 % (08/09/23 1512) Vital Signs (24h Range):  Temp:  [97.4 °F (36.3 °C)-98.3 °F (36.8 °C)] 98 °F (36.7 °C)  Pulse:  [62-78] 62  Resp:  [18-26] 18  SpO2:  [83 %-97 %] 95 %  BP: ()/(54-75) 108/69     Weight: 81.6 kg (180 lb)  Body mass index is 25.1 kg/m².    Intake/Output Summary (Last 24 hours) at 8/9/2023 1544  Last data filed at 8/9/2023 1143  Gross per 24 hour   Intake --   Output 550 ml   Net -550 ml         Physical Exam  Vitals and nursing note reviewed.   Constitutional:       General: He is not in acute distress.  HENT:      Head: Normocephalic and atraumatic.      Nose: Nose normal.       Mouth/Throat:      Mouth: Mucous membranes are moist.   Eyes:      Extraocular Movements: Extraocular movements intact.      Pupils: Pupils are equal, round, and reactive to light.   Cardiovascular:      Rate and Rhythm: Normal rate and regular rhythm.   Pulmonary:      Effort: Pulmonary effort is normal.      Breath sounds: Normal breath sounds.   Abdominal:      General: There is distension.      Tenderness: There is abdominal tenderness. There is guarding. There is no rebound.   Musculoskeletal:         General: Normal range of motion.      Cervical back: Normal range of motion and neck supple.   Skin:     General: Skin is warm.   Neurological:      Mental Status: He is alert and oriented to person, place, and time.      Motor: Weakness present.   Psychiatric:      Comments: anxious             Significant Labs: All pertinent labs within the past 24 hours have been reviewed.  Recent Lab Results  (Last 5 results in the past 24 hours)        08/09/23  1022   08/09/23  0949   08/09/23  0754   08/09/23  0037   08/08/23  1733        Albumin       3.1         Alkaline Phosphatase       79         ALT       8         Anion Gap       9         Ao root annulus 3.20               Ao peak theron 1.65               Ao VTI 34.30               Appearance, UA     Clear           AST       11         AV valve area 2.52               CESAR by Velocity Ratio 2.39               AORTIC VALVE CUSP SEPERATION 5.20               AV mean gradient 6               AV index (prosthetic) 0.61               AV peak gradient 11               AV Velocity Ratio 0.58               Bacteria, UA     Negative           Baso #       0.03         Basophil %       0.2         Bilirubin (UA)     Negative           BILIRUBIN TOTAL       1.1  Comment: For infants and newborns, interpretation of results should be based  on gestational age, weight and in agreement with clinical  observations.    Premature Infant recommended reference ranges:  Up to 24  hours.............<8.0 mg/dL  Up to 48 hours............<12.0 mg/dL  3-5 days..................<15.0 mg/dL  6-29 days.................<15.0 mg/dL           Blood Culture, Routine       No Growth to date  [P]         BSA 2.02               BUN       15         Calcium       8.5         Chloride       104         CO2       22         Color, UA     Yellow           Creatinine       0.6         Left Ventricle Relative Wall Thickness 0.39               Differential Method       Automated         E/A ratio 0.88               E/E' ratio 6.72               eGFR       >60.0         EF 60               Eos #       0.1         Eosinophil %       0.7         E wave deceleration time 215.00               FS 32               Glucose       102         Glucose, UA     Negative           Gran # (ANC)       11.3         Gran %       83.9         Hematocrit       40.4         Hemoglobin       13.6         Hyaline Casts, UA     6           Immature Grans (Abs)       0.06  Comment: Mild elevation in immature granulocytes is non specific and   can be seen in a variety of conditions including stress response,   acute inflammation, trauma and pregnancy. Correlation with other   laboratory and clinical findings is essential.           Immature Granulocytes       0.4         IVSd 0.93               Ketones, UA     Trace           Lactate, Rayshawn       0.8  Comment: Falsely low lactic acid results can be found in samples   containing >=13.0 mg/dL total bilirubin and/or >=3.5 mg/dL   direct bilirubin.           LA size 3.90               LVOT area 4.2               Leukocytes, UA     Negative           LV LATERAL E/E' RATIO 4.94               LV SEPTAL E/E' RATIO 10.50               LV EDV BP 80.40               LV Diastolic Volume Index 39.80               LVIDd 4.24               LVIDs 2.90               LV mass 116.89               LV Mass Index 58               Left Ventricular Outflow Tract Mean Gradient 2.00               Left Ventricular  Outflow Tract Mean Velocity 0.60               LVOT diameter 2.30               LVOT peak gutierrez 0.95               LVOT stroke volume 86.38               LVOT peak VTI 20.80               LV ESV BP 32.20               LV Systolic Volume Index 15.9               Lymph #       1.2         Lymph %       9.2         MCH       32.9         MCHC       33.7         MCV       98         Mean e' 0.13               Microscopic Comment     SEE COMMENT  Comment: Other formed elements not mentioned in the report are not   present in the microscopic examination.              Mono #       0.8         Mono %       5.6         MPV       9.9         MV valve area p 1/2 method 2.93               MV valve area by continuity eq 2.73               MV mean gradient 2               MV peak gradient 4               MV Peak A Gutierrez 0.95               MV Peak E Gutierrez 0.84               MV stenosis pressure 1/2 time 75.00               MV VTI 31.6               NITRITE UA     Negative           nRBC       0         Occult Blood UA     Negative           pH, UA     6.0           Platelets       173         Potassium       3.5         PROTEIN TOTAL       7.2         Protein, UA     1+  Comment: Recommend a 24 hour urine protein or a urine   protein/creatinine ratio if globulin induced proteinuria is  clinically suspected.             Pulmonary Valve Mean Velocity 0.69               PV peak gradient 4               PV PEAK VELOCITY 1.05               Posterior Wall 0.83               Est. RA pres 3               RBC       4.13         RBC, UA     4           RDW       14.0         RV S' 9.72               RV TB RVSP 7               RVDD 3.71               Sodium       135         Specific Gravity, UA     1.020           Specimen UA     Urine, Clean Catch           Squam Epithel, UA     1           TAPSE 1.90               TDI SEPTAL 0.08               TDI LATERAL 0.17               Triscuspid Valve Regurgitation Peak Gradient 65               TR Max  Gutierrez 4.02               Troponin I         0.145  Comment: The reference interval for Troponin I represents the 99th percentile   cutoff   for our facility and is consistent with 3rd generation assay   performance.         Troponin I High Sensitivity   42.7  Comment: Troponin results differ between methods. Do not use   results between Troponin methods interchangeably.    Alkaline Phospatase levels above 400 U/L may   cause false positive results.    Access hsTnI should not be used for patients taking   Asfotase nina (Strensiq).       80.5  Comment: Troponin results differ between methods. Do not use   results between Troponin methods interchangeably.    Alkaline Phospatase levels above 400 U/L may   cause false positive results.    Access hsTnI should not be used for patients taking   Asfotase nina (Strensiq).  Troponin critical result(s) called and verbal readback obtained   from Rudy Badillo RN Cardio B  by MS1 08/09/2023 01:21           TV resting pulmonary artery pressure 68               UROBILINOGEN UA     4.0-6.0           WBC, UA     2           WBC       13.47         ZLVIDD -3.39               ZLVIDS -1.82                                       [P] - Preliminary Result               Significant Imaging: I have reviewed all pertinent imaging results/findings within the past 24 hours.      Assessment/Plan:      No notes have been filed under this hospital service.  Service: Hospital Medicine    VTE Risk Mitigation (From admission, onward)           Ordered     enoxaparin injection 80 mg  Every 12 hours         08/09/23 0015                    Discharge Planning   BAY: 8/11/2023     Code Status: Not on file   Is the patient medically ready for discharge?:     Reason for patient still in hospital (select all that apply): Patient new problem, Treatment and Consult recommendations                     Harvinder Pimentel MD  Department of Hospital Medicine   [facility]

## 2023-08-10 NOTE — ASSESSMENT & PLAN NOTE
Suspect this was due to injury while working on his car  Monitor symptoms  He was also constipated will start aggressive bowel regimen  Advance diet  HIDA scan with some possibility of biliary dyskinesia will need follow up with surgery as an outpatient, cardiology okay with holding Plavix for surgery if needed

## 2023-08-10 NOTE — ASSESSMENT & PLAN NOTE
Noted, on Plavix  Per Cardiology it was okay to hold Plavix for surgery if needed  Stress test is negative

## 2023-08-10 NOTE — SUBJECTIVE & OBJECTIVE
Interval History:  Feeling better today.  He says that he was working on his car whenever he developed the right upper quadrant pain.  Pain is more in his right flank and mid abdomen.  It is point tender.  He thinks he may have injured himself while working on his car.  He has no nausea.  He has had increased belching but no specific heartburn.  Stress test today is negative    Review of Systems   All other systems reviewed and are negative.    Objective:     Vital Signs (Most Recent):  Temp: 98.5 °F (36.9 °C) (08/10/23 1536)  Pulse: 83 (08/10/23 1536)  Resp: 18 (08/10/23 1536)  BP: 104/60 (08/10/23 1536)  SpO2: 95 % (08/10/23 1536) Vital Signs (24h Range):  Temp:  [98.1 °F (36.7 °C)-98.7 °F (37.1 °C)] 98.5 °F (36.9 °C)  Pulse:  [70-83] 83  Resp:  [18-20] 18  SpO2:  [92 %-96 %] 95 %  BP: ()/(60-65) 104/60     Weight: 81.6 kg (180 lb)  Body mass index is 25.1 kg/m².    Intake/Output Summary (Last 24 hours) at 8/10/2023 1628  Last data filed at 8/10/2023 0842  Gross per 24 hour   Intake 0 ml   Output 950 ml   Net -950 ml         Physical Exam  Vitals reviewed.   Constitutional:       Appearance: Normal appearance.   HENT:      Head: Normocephalic and atraumatic.      Nose: Nose normal.      Mouth/Throat:      Mouth: Mucous membranes are moist.   Eyes:      Pupils: Pupils are equal, round, and reactive to light.   Cardiovascular:      Rate and Rhythm: Normal rate and regular rhythm.      Pulses: Normal pulses.      Heart sounds: Normal heart sounds. No murmur heard.     No friction rub. No gallop.   Pulmonary:      Effort: Pulmonary effort is normal. No respiratory distress.      Breath sounds: Normal breath sounds.   Abdominal:      General: Abdomen is flat. Bowel sounds are normal. There is no distension.      Palpations: Abdomen is soft.      Tenderness: There is no abdominal tenderness.      Comments: He has point tenderness to palpation in his right mid abdomen   Musculoskeletal:         General: No  swelling. Normal range of motion.      Cervical back: Normal range of motion and neck supple.   Skin:     General: Skin is warm and dry.   Neurological:      General: No focal deficit present.      Mental Status: He is alert and oriented to person, place, and time.   Psychiatric:         Mood and Affect: Mood normal.         Behavior: Behavior normal.         Thought Content: Thought content normal.         Judgment: Judgment normal.             Significant Labs: All pertinent labs within the past 24 hours have been reviewed.    Significant Imaging: I have reviewed all pertinent imaging results/findings within the past 24 hours.

## 2023-08-10 NOTE — RESPIRATORY THERAPY
08/10/23 0842   Patient Assessment/Suction   Level of Consciousness (AVPU) alert   Respiratory Effort Normal;Unlabored   Expansion/Accessory Muscles/Retractions expansion symmetric;no use of accessory muscles;no retractions   All Lung Fields Breath Sounds Anterior:;Lateral:;diminished   Rhythm/Pattern, Respiratory no shortness of breath reported;depth regular;pattern regular;unlabored   Cough Frequency no cough   Skin Integrity   $ Wound Care Tech Time 15 min   Area Observed Left;Right;Behind ear;Cheek;Upper lip   Skin Appearance without discoloration   PRE-TX-O2   Device (Oxygen Therapy) nasal cannula   $ Is the patient on Low Flow Oxygen? Yes   Flow (L/min) 2   SpO2 (!) 94 %   Pulse Oximetry Type Intermittent   $ Pulse Oximetry - Multiple Charge Pulse Oximetry - Multiple   Pulse 75   Resp 18   Positioning HOB elevated 45 degrees   Aerosol Therapy   $ Aerosol Therapy Charges Aerosol Treatment   Respiratory Treatment Status (SVN) given   Treatment Route (SVN) mask;oxygen   Patient Position (SVN) HOB elevated;semi-Wolf's   Post Treatment Assessment (SVN) breath sounds unchanged   Signs of Intolerance (SVN) none   Breath Sounds Post-Respiratory Treatment   Post-treatment Heart Rate (beats/min) 76   Post-treatment Resp Rate (breaths/min) 18

## 2023-08-10 NOTE — PROGRESS NOTES
[facility] Hospital Medicine  Progress Note    Patient Name: Srinivasa Oliver  MRN: [id number]  Patient Class: IP- Inpatient   Admission Date: 8/8/2023  Length of Stay: 2 days  Attending Physician: Abdirizak Edwards MD  Primary Care Provider: Genaro Woamck MD        Subjective:     Principal Problem:Abdominal pain        HPI:  No notes on file    Overview/Hospital Course:  8/9/2023  Pt continues to have 10/10 RUQ pain without nausea vomiting fever or chills  No hematemesis melena or hematochezia      Interval History:  Feeling better today.  He says that he was working on his car whenever he developed the right upper quadrant pain.  Pain is more in his right flank and mid abdomen.  It is point tender.  He thinks he may have injured himself while working on his car.  He has no nausea.  He has had increased belching but no specific heartburn.  Stress test today is negative    Review of Systems   All other systems reviewed and are negative.    Objective:     Vital Signs (Most Recent):  Temp: 98.5 °F (36.9 °C) (08/10/23 1536)  Pulse: 83 (08/10/23 1536)  Resp: 18 (08/10/23 1536)  BP: 104/60 (08/10/23 1536)  SpO2: 95 % (08/10/23 1536) Vital Signs (24h Range):  Temp:  [98.1 °F (36.7 °C)-98.7 °F (37.1 °C)] 98.5 °F (36.9 °C)  Pulse:  [70-83] 83  Resp:  [18-20] 18  SpO2:  [92 %-96 %] 95 %  BP: ()/(60-65) 104/60     Weight: 81.6 kg (180 lb)  Body mass index is 25.1 kg/m².    Intake/Output Summary (Last 24 hours) at 8/10/2023 1628  Last data filed at 8/10/2023 0842  Gross per 24 hour   Intake 0 ml   Output 950 ml   Net -950 ml         Physical Exam  Vitals reviewed.   Constitutional:       Appearance: Normal appearance.   HENT:      Head: Normocephalic and atraumatic.      Nose: Nose normal.      Mouth/Throat:      Mouth: Mucous membranes are moist.   Eyes:      Pupils: Pupils are equal, round, and reactive to light.   Cardiovascular:      Rate and Rhythm: Normal rate and regular rhythm.      Pulses:  Normal pulses.      Heart sounds: Normal heart sounds. No murmur heard.     No friction rub. No gallop.   Pulmonary:      Effort: Pulmonary effort is normal. No respiratory distress.      Breath sounds: Normal breath sounds.   Abdominal:      General: Abdomen is flat. Bowel sounds are normal. There is no distension.      Palpations: Abdomen is soft.      Tenderness: There is no abdominal tenderness.      Comments: He has point tenderness to palpation in his right mid abdomen   Musculoskeletal:         General: No swelling. Normal range of motion.      Cervical back: Normal range of motion and neck supple.   Skin:     General: Skin is warm and dry.   Neurological:      General: No focal deficit present.      Mental Status: He is alert and oriented to person, place, and time.   Psychiatric:         Mood and Affect: Mood normal.         Behavior: Behavior normal.         Thought Content: Thought content normal.         Judgment: Judgment normal.             Significant Labs: All pertinent labs within the past 24 hours have been reviewed.    Significant Imaging: I have reviewed all pertinent imaging results/findings within the past 24 hours.      Assessment/Plan:      * Abdominal pain  Suspect this was due to injury while working on his car  Monitor symptoms  He was also constipated will start aggressive bowel regimen  Advance diet  HIDA scan with some possibility of biliary dyskinesia will need follow up with surgery as an outpatient, cardiology okay with holding Plavix for surgery if needed      NSTEMI (non-ST elevated myocardial infarction)  Type 2 MI  Symptoms resolved and stress test is negative      Paroxysmal atrial fibrillation  Continue home regimen    Lung nodule  Noted, outpatient follow-up      Bilateral hearing loss  Noted      Hyperlipidemia, baseline   Continue statin      HTN (hypertension)  Continue home regimen      CAD (coronary artery disease)  Noted, on Plavix  Per Cardiology it was okay to  hold Plavix for surgery if needed  Stress test is negative        VTE Risk Mitigation (From admission, onward)         Ordered     enoxaparin injection 80 mg  Every 12 hours         08/09/23 0015                Discharge Planning   BAY: 8/11/2023     Code Status: Not on file   Is the patient medically ready for discharge?:     Reason for patient still in hospital (select all that apply): Treatment  Discharge Plan A: Home with family                  Abdirizak Edwards MD  Department of Hospital Medicine   [facility]

## 2023-08-10 NOTE — PROGRESS NOTES
[facility]  Department of Cardiology  Consult Note      PATIENT NAME: Srinivasa Oliver  MRN: [id number]  TODAY'S DATE: 08/10/2023  ADMIT DATE: 8/8/2023                          CONSULT REQUESTED BY: Abdirizak Edwards MD    SUBJECTIVE     PRINCIPAL PROBLEM: Abdominal pain      REASON FOR CONSULT:    Elevated Troponin    Interval history  08/10/2023    Patient examined during stress test.  No acute distress.  Tolerated test.  Reviewed labs leukocytosis improving.  K 3.5, creatinine 0.6.  Continues to complain of right upper quadrant abdominal pain.  Nuclear portion of the stress test is negative for reversible ischemia.  Echo this admission showed EF of 60% with normal diastolic function.  Moderate pulmonary hypertension and moderate tricuspid regurgitation.  HPI:    Patient is a 67-year-old male with past medical history of anxiety, anemia, CHF, hypertension, CAD s/p CABG in 2005 and s/p PCI 2013, emphysema, who presented to the ED with complaints of right upper quadrant abdominal pain x3 days that worsens with movement and improves with rest.  No associated nausea or vomiting or diarrhea.  Denies chest pain or shortness of breath.  Ultrasound shows cholelithiasis.      In ED WBC 18.45, H&H 14.8/44.3, K 3.5, creatinine 1.0, , Troponin I 0.118, >0.145, troponin HS 80.5. CXR shows severe heterogeneous interstitial fibrosis and hypoventilation    ECHO 8/30/23  The estimated PA systolic pressure is 55 mmHg.  There is moderate pulmonary hypertension.  The left ventricle is normal in size with normal systolic function.  The estimated ejection fraction is 55%.  Normal left ventricular diastolic function.  Mild to moderate tricuspid regurgitation.  Mild right ventricular enlargement with mildly reduced right ventricular systolic function.      FROM H&P   Abdominal Pain       Right side abdominal pain x 2 days          HPI:   67-year-old male with a history of a CABG CHF coronary stents,pulmonary  "fibrosis on home oxygen who presents to the ER with 3 days of right upper quadrant abdominal pain. The pain got worse with movement and improving with less movement.  No other complaints.  No nausea no vomiting no chest pain or flank pain.  Patient had ultrasound done.  Ultrasound show cholelithiasis.  Looking in the chart I did not see the EKG.  However looking to the ER's note, he mentions " there is no ST elevation but he has ST depression in V2 and V3 concerning for posterior ischemia these changes appear to be new from prior EKGs."  ER note have not been signed yet.  Will Repeat EKG at [facility] .  Patient reports he had no chest pain.  Repeat new lab is pending.       Past Medical History:         Review of patient's allergies indicates:   Allergen Reactions    Atorvastatin      Other reaction(s): Rash       Past Medical History:   Diagnosis Date    Adenomatous colon polyp 01/28/2011    Adenomatous colon polyp     Anxiety     Blood transfusion     CHF (congestive heart failure)     Emphysema of lung     Hypertension     Renal disorder     kidney stones    S/P coronary artery stent placement 01/07/2013    Staph skin infection     abd/ lanced x 2    Ulcer      Past Surgical History:   Procedure Laterality Date    COLONOSCOPY  01/28/2011    Dr Choudhary, tubular adenoma    CORONARY ARTERY BYPASS GRAFT  2005    CORONARY STENT PLACEMENT      ENDOBRONCHIAL ULTRASOUND N/A 7/18/2022    Procedure: ENDOBRONCHIAL ULTRASOUND (EBUS);  Surgeon: Alli Sandra MD;  Location: [facility];  Service: Pulmonary;  Laterality: N/A;    NAVIGATIONAL BRONCHOSCOPY Bilateral 7/18/2022    Procedure: BRONCHOSCOPY, NAVIGATIONAL;  Surgeon: Alli Sandra MD;  Location: [facility];  Service: Pulmonary;  Laterality: Bilateral;     Social History     Tobacco Use    Smoking status: Former     Current packs/day: 1.00     Average packs/day: 0.8 packs/day for 47.2 years (36.7 ttl pk-yrs)     Types: Cigarettes     Start date: 5/31/1976 "    Smokeless tobacco: Never    Tobacco comments:     Pt currently smoking 1ppd. Ready to quit, has a patch on while admitted for cravings. Has made multiple attempts to quit and has started again. Inpatient smoking cessation done 8/9/23. Pt is a [employer] Member: [id number]   Substance Use Topics    Alcohol use: No     Alcohol/week: 0.0 standard drinks of alcohol    Drug use: No        REVIEW OF SYSTEMS    As mentioned in HPI    OBJECTIVE     VITAL SIGNS (Most Recent)  Temp: 98.1 °F (36.7 °C) (08/10/23 1115)  Pulse: 73 (08/10/23 1115)  Resp: 18 (08/10/23 1121)  BP: 108/64 (08/10/23 1115)  SpO2: 95 % (08/10/23 1115)    VENTILATION STATUS  Resp: 18 (08/10/23 1121)  SpO2: 95 % (08/10/23 1115)           I & O (Last 24H):  Intake/Output Summary (Last 24 hours) at 8/10/2023 1217  Last data filed at 8/10/2023 0842  Gross per 24 hour   Intake 0 ml   Output 950 ml   Net -950 ml       WEIGHTS  Wt Readings from Last 3 Encounters:   08/08/23 1123 81.6 kg (180 lb)   08/09/23 1106 81.6 kg (179 lb 14.3 oz)   09/22/22 1445 77 kg (169 lb 12.1 oz)       PHYSICAL EXAM    CONSTITUTIONAL: NAD  HEENT: Normocephalic. No pallor  NECK: no JVD  LUNGS: CTA b/l  HEART: regular rate and rhythm, S1, S2 normal, no murmur   ABDOMEN: bowel sounds normal; RUQ TTP  EXTREMITIES: No edema  SKIN: No rash  NEURO: AAO X 3  PSYCH: normal affect      HOME MEDICATIONS:  No current facility-administered medications on file prior to encounter.     Current Outpatient Medications on File Prior to Encounter   Medication Sig Dispense Refill    traZODone (DESYREL) 50 MG tablet Take 1 tablet (50 mg total) by mouth every evening. 90 tablet 3    albuterol (PROVENTIL HFA) 90 mcg/actuation inhaler Inhale 2 puffs into the lungs every 4 (four) hours as needed for Wheezing. Every 4-6 hours PRN 18 g 6    albuterol (PROVENTIL) 2.5 mg /3 mL (0.083 %) nebulizer solution Take 3 mLs (2.5 mg total) by nebulization every 6 (six) hours as needed for Wheezing. Rescue 120 mL 11     ascorbic acid, vitamin C, (VITAMIN C) 1000 MG tablet Take 1,000 mg by mouth once daily.      aspirin (ECOTRIN) 81 MG EC tablet Take 81 mg by mouth once daily.      clopidogreL (PLAVIX) 75 mg tablet Take 1 tablet (75 mg total) by mouth once daily. Need office visit before next refill, last seen 1/2019. This will be the LAST Rx if visit is not made. 90 tablet 3    coenzyme Q10-vitamin E 100-100 mg-unit Cap Take 200 mg by mouth once daily. Every day      DOCOSAHEXANOIC ACID/EPA (FISH OIL ORAL) Take 1 capsule by mouth 2 (two) times daily as needed. Twice a day      EScitalopram oxalate (LEXAPRO) 10 MG tablet Take 1 tablet (10 mg total) by mouth once daily. (Patient taking differently: Take 10 mg by mouth every evening.) 90 tablet 3    ferrous sulfate (FEOSOL) 325 mg (65 mg iron) Tab tablet Take 325 mg by mouth daily with breakfast.      ipratropium-albuteroL (COMBIVENT RESPIMAT)  mcg/actuation inhaler Inhale 1 puff into the lungs every 6 (six) hours as needed for Wheezing. Rescue      metoprolol succinate (TOPROL-XL) 50 MG 24 hr tablet Take 1 tablet by mouth in the evening 90 tablet 0    nicotine (NICODERM CQ) 14 mg/24 hr Place 1 patch onto the skin once daily. 28 patch 0    nintedanib (OFEV) 150 mg Cap Take 1 capsule (150 mg total) by mouth 2 (two) times a day. 60 capsule 11    rosuvastatin (CRESTOR) 20 MG tablet Take 1 tablet (20 mg total) by mouth once daily. (Patient taking differently: Take 20 mg by mouth every evening.) 90 tablet 3    varenicline (CHANTIX) 1 mg Tab Take one-half tablet once a day for 3 days, then increase to one-half tablet twice a day for 4 days. Beginning on Day 8, take 1 tablet twice daily until complete 56 tablet 0       SCHEDULED MEDS:   albuterol-ipratropium  3 mL Nebulization Q6H    ascorbic acid (vitamin C)  1,000 mg Oral Daily    atorvastatin  40 mg Oral QHS    enoxparin  1 mg/kg Subcutaneous Q12H (prophylaxis, 0900/2100)    EScitalopram oxalate  10 mg Oral Daily    ferrous sulfate   "1 tablet Oral BID    metoprolol succinate  50 mg Oral QHS    nicotine  1 patch Transdermal Daily    piperacillin-tazobactam (Zosyn) IV (PEDS and ADULTS) (extended infusion is not appropriate)  3.375 g Intravenous Q8H    traZODone  50 mg Oral QHS    varenicline  1 mg Oral Daily       CONTINUOUS INFUSIONS:    PRN MEDS:albuterol, morphine    LABS AND DIAGNOSTICS     CBC LAST 3 DAYS  Recent Labs   Lab 08/08/23  1259 08/09/23  0037   WBC 18.45* 13.47*   RBC 4.49* 4.13*   HGB 14.8 13.6*   HCT 44.3 40.4   MCV 99* 98   MCH 33.0* 32.9*   MCHC 33.4 33.7   RDW 13.7 14.0    173   MPV 10.0 9.9   GRAN 88.8*  16.4* 83.9*  11.3*   LYMPH 5.0*  0.9* 9.2*  1.2   MONO 5.0  0.9 5.6  0.8   BASO 0.05 0.03   NRBC 0 0       COAGULATION LAST 3 DAYS  No results for input(s): "LABPT", "INR", "APTT" in the last 168 hours.    CHEMISTRY LAST 3 DAYS  Recent Labs   Lab 08/08/23  1259 08/09/23  0037   * 135*   K 3.5 3.5    104   CO2 20* 22*   ANIONGAP 11 9   BUN 16 15   CREATININE 1.0 0.6   * 102   CALCIUM 9.1 8.5*   ALBUMIN 3.1* 3.1*   PROT 8.0 7.2   ALKPHOS 94 79   ALT <5* 8*   AST 11 11   BILITOT 0.8 1.1*       CARDIAC PROFILE LAST 3 DAYS  Recent Labs   Lab 08/08/23  1259 08/08/23  1733 08/09/23  0037 08/09/23  0949   *  --   --   --    TROPONINI 0.118* 0.145*  --   --    TROPONINIHS  --   --  80.5* 42.7*       ENDOCRINE LAST 3 DAYS  No results for input(s): "TSH", "PROCAL" in the last 168 hours.    LAST ARTERIAL BLOOD GAS  ABG  No results for input(s): "PH", "PO2", "PCO2", "HCO3", "BE" in the last 168 hours.    LAST 7 DAYS MICROBIOLOGY   Microbiology Results (last 7 days)       Procedure Component Value Units Date/Time    Blood culture [[id number]] Collected: 08/09/23 0037    Order Status: Completed Specimen: Blood from Peripheral, Right Hand Updated: 08/10/23 0232     Blood Culture, Routine No Growth to date      No Growth to date    Narrative:      Blood culture x 2            MOST RECENT IMAGING  NM " Myocardial Perfusion Spect Multi Pharmacologic  Myocardial perfusion scan with ejection fraction calculation    Clinical data: Chest pain    Radiopharmaceutical: 10.2 mCi Technetium 99m Myoview at rest; 24.5 mCi Technetium 99m Myoview following LexiScan stress    PROCEDURE:  Gated tomographic reconstruction images were obtained, with imaging in the short axis, as well as vertical and horizontal long axes.    FINDINGS: There is a normal distribution of tracer activity throughout the left ventricular myocardium. There is no scintigraphic evidence of reversible ischemia or prior infarction. Left ventricular chamber size is normal. No wall motion abnormality is seen. Estimated ejection fraction is 75%.    IMPRESSION:    1. No scintigraphic evidence of reversible myocardial ischemia.    2. Estimated ejection fraction is 75%    Electronically signed by:  Allegra Amaya MD  8/10/2023 11:30 AM CDT Workstation: [id number]      ECHOCARDIOGRAM RESULTS (last 5)  Results for orders placed in visit on 08/30/22    Echo    Interpretation Summary  · The estimated PA systolic pressure is 55 mmHg.  · There is moderate pulmonary hypertension.  · The left ventricle is normal in size with normal systolic function.  · The estimated ejection fraction is 55%.  · Normal left ventricular diastolic function.  · Mild to moderate tricuspid regurgitation.  · Mild right ventricular enlargement with mildly reduced right ventricular systolic function.      Results for orders placed during the hospital encounter of 01/15/19    STRESS TEST REPORT      Transthoracic echo (TTE) complete (Cupid Only)    Interpretation Summary  · Normal left ventricular size, wall motion and systolic function. The estimated ejection fraction is 60%. Grade 1 diastolic dysfunction.  · Normal right ventricular size and systolic function.  · No significant valvular disorder observed.  · Mild elevated PA systolic pressure, 43 mm Hg by calculation.      CURRENT/PREVIOUS VISIT  EKG  Results for orders placed or performed during the hospital encounter of 08/08/23   EKG 12-lead    Collection Time: 08/08/23 11:37 PM    Narrative    Test Reason : I21.4,    Vent. Rate : 068 BPM     Atrial Rate : 068 BPM     P-R Int : 130 ms          QRS Dur : 088 ms      QT Int : 424 ms       P-R-T Axes : 033 027 035 degrees     QTc Int : 450 ms    Sinus rhythm with Premature atrial complexes with Aberrant conduction  Possible Left atrial enlargement  Nonspecific ST abnormality  Abnormal ECG  When compared with ECG of 08-AUG-2023 13:20,  Aberrant conduction is now Present  T wave inversion less evident in Anterior leads    Referred By: [email or web address]   SELF           Confirmed By:            ASSESSMENT/PLAN:     Active Hospital Problems    Diagnosis    *Abdominal pain    NSTEMI (non-ST elevated myocardial infarction)    Paroxysmal atrial fibrillation    Lung nodule    Pulmonary fibrosis    S/P CABG x 3, 2007    Reactive depression    Hyperlipidemia, baseline     Bilateral hearing loss    Obesity (BMI 30.0-34.9), today 30.7    Varicose veins of lower extremities with other complications     Dx updated per 2019 IMO Load      CAD (coronary artery disease)    HTN (hypertension)    Tobacco dependence       ASSESSMENT & PLAN:     Elevated Troponin HS  Acute Abdominal Pain  Cholelithiasis  Leukocytosis  CAD S/P CABG x 3 and PCI  HTN  Obesity   Tobacco Abuse  HLD  Pulmonary Fibrosis      RECOMMENDATIONS:    Patient presented to the ED with acute RUQ abdominal pain.  US abdomen showed cholelithiasis.  Leukocytosis this admission.  Downtrending troponin HS.  Denies chest pain.  History of CAD, s/p CABG and subsequent PCI.   Normal systolic and diastolic function on echocardiogram.  Normal wall motion.  Stress test this a.m. negative for reversible ischemia.  EKG portion negative.  Aspirin Plavix on hold.  Hold aspirin and plavix x 7 days if surgery is planned.  Can restart if no plans for procedures.  Thank you for the  consultation.  Cardiology will sign off at this time.      Shanika De Guzman NP  Department of Cardiology  Date of Service: 08/10/2023        I have personally interviewed and examined the patient, I have reviewed the Nurse Practitioner's history and physical, assessment, and plan. I agree with the findings and plan.      Aaron Gutiérrez M.D.  Department of Cardiology  Date of Service: 08/10/2023  9:26 AM

## 2023-08-10 NOTE — PLAN OF CARE
08/09/23 2032   Patient Assessment/Suction   Level of Consciousness (AVPU) alert   Respiratory Effort Normal;Unlabored   All Lung Fields Breath Sounds diminished;clear   Rhythm/Pattern, Respiratory pattern regular;unlabored   PRE-TX-O2   Device (Oxygen Therapy) nasal cannula   Flow (L/min) 3   SpO2 (!) 92 %   Pulse Oximetry Type Intermittent   $ Pulse Oximetry - Multiple Charge Pulse Oximetry - Multiple   Pulse 71   Resp 18   Aerosol Therapy   $ Aerosol Therapy Charges Aerosol Treatment   Daily Review of Necessity (SVN) completed   Respiratory Treatment Status (SVN) given   Treatment Route (SVN) mask;oxygen   Patient Position (SVN) Wolf's;HOB elevated   Post Treatment Assessment (SVN) breath sounds improved   Signs of Intolerance (SVN) none   Education   $ Education 15 min;Bronchodilator   Respiratory Evaluation   $ Care Plan Tech Time 15 min

## 2023-08-10 NOTE — RESPIRATORY THERAPY
08/10/23 1193   Patient Assessment/Suction   Level of Consciousness (AVPU) alert   Respiratory Effort Normal;Unlabored   Expansion/Accessory Muscles/Retractions expansion symmetric;no use of accessory muscles;no retractions   All Lung Fields Breath Sounds Anterior:;Lateral:;diminished   Rhythm/Pattern, Respiratory no shortness of breath reported;depth regular;pattern regular;unlabored   Cough Frequency no cough   PRE-TX-O2   Device (Oxygen Therapy) nasal cannula   Flow (L/min) 2  (pt stated wears up to 4L at home)   SpO2 95 %   Pulse Oximetry Type Intermittent   Pulse 75   Resp 18   Positioning HOB elevated 45 degrees   Aerosol Therapy   $ Aerosol Therapy Charges Aerosol Treatment   Respiratory Treatment Status (SVN) given   Treatment Route (SVN) mask;oxygen   Patient Position (SVN) HOB elevated;semi-Wolf's   Post Treatment Assessment (SVN) breath sounds unchanged   Signs of Intolerance (SVN) none   Breath Sounds Post-Respiratory Treatment   Post-treatment Heart Rate (beats/min) 75   Post-treatment Resp Rate (breaths/min) 18   Respiratory Evaluation   $ Care Plan Tech Time 15 min   $ Eval/Re-eval Charges Re-evaluation   Home Oxygen   Has Home Oxygen? Yes   Liter Flow 4   Duration continuous   Route nasal cannula   Mode continuous   Device home concentrator with portable unit

## 2023-08-11 ENCOUNTER — TELEPHONE (OUTPATIENT)
Dept: FAMILY MEDICINE | Facility: CLINIC | Age: 68
End: 2023-08-11
Payer: MEDICARE

## 2023-08-11 VITALS
OXYGEN SATURATION: 92 % | TEMPERATURE: 98 F | HEIGHT: 71 IN | HEART RATE: 90 BPM | BODY MASS INDEX: 25.2 KG/M2 | RESPIRATION RATE: 20 BRPM | DIASTOLIC BLOOD PRESSURE: 70 MMHG | SYSTOLIC BLOOD PRESSURE: 120 MMHG | WEIGHT: 180 LBS

## 2023-08-11 LAB
ANION GAP SERPL CALC-SCNC: 9 MMOL/L (ref 8–16)
BUN SERPL-MCNC: 8 MG/DL (ref 8–23)
CALCIUM SERPL-MCNC: 9.1 MG/DL (ref 8.7–10.5)
CHLORIDE SERPL-SCNC: 102 MMOL/L (ref 95–110)
CO2 SERPL-SCNC: 27 MMOL/L (ref 23–29)
CREAT SERPL-MCNC: 0.7 MG/DL (ref 0.5–1.4)
ERYTHROCYTE [DISTWIDTH] IN BLOOD BY AUTOMATED COUNT: 13.7 % (ref 11.5–14.5)
EST. GFR  (NO RACE VARIABLE): >60 ML/MIN/1.73 M^2
GLUCOSE SERPL-MCNC: 102 MG/DL (ref 70–110)
HCT VFR BLD AUTO: 41.9 % (ref 40–54)
HGB BLD-MCNC: 13.8 G/DL (ref 14–18)
MCH RBC QN AUTO: 33.2 PG (ref 27–31)
MCHC RBC AUTO-ENTMCNC: 32.9 G/DL (ref 32–36)
MCV RBC AUTO: 101 FL (ref 82–98)
PLATELET # BLD AUTO: 178 K/UL (ref 150–450)
PMV BLD AUTO: 10.3 FL (ref 9.2–12.9)
POTASSIUM SERPL-SCNC: 3.7 MMOL/L (ref 3.5–5.1)
RBC # BLD AUTO: 4.16 M/UL (ref 4.6–6.2)
SODIUM SERPL-SCNC: 138 MMOL/L (ref 136–145)
WBC # BLD AUTO: 8.19 K/UL (ref 3.9–12.7)

## 2023-08-11 PROCEDURE — 94640 AIRWAY INHALATION TREATMENT: CPT

## 2023-08-11 PROCEDURE — 99900031 HC PATIENT EDUCATION (STAT)

## 2023-08-11 PROCEDURE — 63600175 PHARM REV CODE 636 W HCPCS: Performed by: INTERNAL MEDICINE

## 2023-08-11 PROCEDURE — 25000003 PHARM REV CODE 250: Performed by: INTERNAL MEDICINE

## 2023-08-11 PROCEDURE — S4991 NICOTINE PATCH NONLEGEND: HCPCS | Performed by: INTERNAL MEDICINE

## 2023-08-11 PROCEDURE — 36415 COLL VENOUS BLD VENIPUNCTURE: CPT | Performed by: STUDENT IN AN ORGANIZED HEALTH CARE EDUCATION/TRAINING PROGRAM

## 2023-08-11 PROCEDURE — 80048 BASIC METABOLIC PNL TOTAL CA: CPT | Performed by: STUDENT IN AN ORGANIZED HEALTH CARE EDUCATION/TRAINING PROGRAM

## 2023-08-11 PROCEDURE — 94761 N-INVAS EAR/PLS OXIMETRY MLT: CPT

## 2023-08-11 PROCEDURE — 99900035 HC TECH TIME PER 15 MIN (STAT)

## 2023-08-11 PROCEDURE — 27000221 HC OXYGEN, UP TO 24 HOURS

## 2023-08-11 PROCEDURE — 25000003 PHARM REV CODE 250: Performed by: STUDENT IN AN ORGANIZED HEALTH CARE EDUCATION/TRAINING PROGRAM

## 2023-08-11 PROCEDURE — 85027 COMPLETE CBC AUTOMATED: CPT | Performed by: STUDENT IN AN ORGANIZED HEALTH CARE EDUCATION/TRAINING PROGRAM

## 2023-08-11 PROCEDURE — 25000242 PHARM REV CODE 250 ALT 637 W/ HCPCS: Performed by: INTERNAL MEDICINE

## 2023-08-11 RX ORDER — LACTULOSE 10 G/15ML
20 SOLUTION ORAL
Status: DISPENSED | OUTPATIENT
Start: 2023-08-11 | End: 2023-08-11

## 2023-08-11 RX ORDER — METRONIDAZOLE 500 MG/1
500 TABLET ORAL EVERY 8 HOURS
Qty: 21 TABLET | Refills: 0 | Status: SHIPPED | OUTPATIENT
Start: 2023-08-11 | End: 2023-08-18

## 2023-08-11 RX ORDER — CIPROFLOXACIN 500 MG/1
500 TABLET ORAL 2 TIMES DAILY
Qty: 14 TABLET | Refills: 0 | Status: SHIPPED | OUTPATIENT
Start: 2023-08-11 | End: 2023-08-18

## 2023-08-11 RX ADMIN — IPRATROPIUM BROMIDE AND ALBUTEROL SULFATE 3 ML: 2.5; .5 SOLUTION RESPIRATORY (INHALATION) at 08:08

## 2023-08-11 RX ADMIN — IPRATROPIUM BROMIDE AND ALBUTEROL SULFATE 3 ML: 2.5; .5 SOLUTION RESPIRATORY (INHALATION) at 02:08

## 2023-08-11 RX ADMIN — NICOTINE 1 PATCH: 14 PATCH, EXTENDED RELEASE TRANSDERMAL at 09:08

## 2023-08-11 RX ADMIN — LACTULOSE 15 G: 20 SOLUTION ORAL at 09:08

## 2023-08-11 RX ADMIN — FERROUS SULFATE TAB 325 MG (65 MG ELEMENTAL FE) 1 EACH: 325 (65 FE) TAB at 09:08

## 2023-08-11 RX ADMIN — IPRATROPIUM BROMIDE AND ALBUTEROL SULFATE 3 ML: 2.5; .5 SOLUTION RESPIRATORY (INHALATION) at 07:08

## 2023-08-11 RX ADMIN — OXYCODONE HYDROCHLORIDE AND ACETAMINOPHEN 1000 MG: 500 TABLET ORAL at 09:08

## 2023-08-11 RX ADMIN — PIPERACILLIN AND TAZOBACTAM 3.38 G: 3; .375 INJECTION, POWDER, LYOPHILIZED, FOR SOLUTION INTRAVENOUS; PARENTERAL at 12:08

## 2023-08-11 RX ADMIN — PIPERACILLIN AND TAZOBACTAM 3.38 G: 3; .375 INJECTION, POWDER, LYOPHILIZED, FOR SOLUTION INTRAVENOUS; PARENTERAL at 04:08

## 2023-08-11 RX ADMIN — LACTULOSE 20 G: 20 SOLUTION ORAL at 12:08

## 2023-08-11 RX ADMIN — VARENICLINE TARTRATE 1 MG: 0.5 TABLET, FILM COATED ORAL at 09:08

## 2023-08-11 RX ADMIN — IPRATROPIUM BROMIDE AND ALBUTEROL SULFATE 3 ML: 2.5; .5 SOLUTION RESPIRATORY (INHALATION) at 01:08

## 2023-08-11 RX ADMIN — ESCITALOPRAM OXALATE 10 MG: 10 TABLET ORAL at 09:08

## 2023-08-11 RX ADMIN — PIPERACILLIN AND TAZOBACTAM 3.38 G: 3; .375 INJECTION, POWDER, LYOPHILIZED, FOR SOLUTION INTRAVENOUS; PARENTERAL at 08:08

## 2023-08-11 NOTE — PLAN OF CARE
08/10/23 2012   Patient Assessment/Suction   Level of Consciousness (AVPU) alert   Respiratory Effort Normal;Unlabored   All Lung Fields Breath Sounds clear;diminished   [place] Breath Sounds wheezes, expiratory   Rhythm/Pattern, Respiratory no shortness of breath reported   PRE-TX-O2   Device (Oxygen Therapy) nasal cannula   Flow (L/min) 2   SpO2 (!) 92 %   Pulse Oximetry Type Intermittent   $ Pulse Oximetry - Multiple Charge Pulse Oximetry - Multiple   Pulse 78   Resp 18   Aerosol Therapy   $ Aerosol Therapy Charges Aerosol Treatment   Daily Review of Necessity (SVN) completed   Respiratory Treatment Status (SVN) given   Treatment Route (SVN) mask;oxygen   Patient Position (SVN) Wolf's;HOB elevated   Post Treatment Assessment (SVN) breath sounds unchanged   Signs of Intolerance (SVN) none   Education   $ Education Bronchodilator;15 min   Respiratory Evaluation   $ Care Plan Tech Time 15 min

## 2023-08-11 NOTE — TELEPHONE ENCOUNTER
----- Message from Kasey Acosta sent at 8/11/2023 11:13 AM CDT -----  Type:  Sooner Appointment Request    Caller is requesting a sooner appointment.  Caller declined first available appointment listed below.  Caller will not accept being placed on the waitlist and is requesting a message be sent to doctor.    Name of Caller:  caprice valdivia  When is the first available appointment?  Aug 24  Symptoms:  hosp f/u   Best Call Back Number:  [phone]    Additional Information:  please advise

## 2023-08-11 NOTE — PLAN OF CARE
Problem: Adult Inpatient Plan of Care  Goal: Plan of Care Review  8/11/2023 1740 by Keke Power LPN  Outcome: Met  8/11/2023 1737 by Keke Power LPN  Outcome: Ongoing, Progressing  Goal: Patient-Specific Goal (Individualized)  8/11/2023 1740 by Keke Power LPN  Outcome: Met  8/11/2023 1737 by Keke Power LPN  Outcome: Ongoing, Progressing  Goal: Absence of Hospital-Acquired Illness or Injury  8/11/2023 1740 by Keke Power LPN  Outcome: Met  8/11/2023 1737 by Keke Power LPN  Outcome: Ongoing, Progressing  Goal: Optimal Comfort and Wellbeing  8/11/2023 1740 by Keke Power LPN  Outcome: Met  8/11/2023 1737 by Keke Power LPN  Outcome: Ongoing, Progressing  Goal: Readiness for Transition of Care  8/11/2023 1740 by Keke Power LPN  Outcome: Met  8/11/2023 1737 by Keke Power LPN  Outcome: Ongoing, Progressing     Problem: Skin Injury Risk Increased  Goal: Skin Health and Integrity  8/11/2023 1740 by Keke Power LPN  Outcome: Met  8/11/2023 1737 by Keke Power LPN  Outcome: Ongoing, Progressing

## 2023-08-11 NOTE — DISCHARGE SUMMARY
[facility]  Discharge Summary      Patient Name: Srinivasa Oliver  MRN: [id number]  JAYLIN: [id number]  Patient Class: IP- Inpatient  Admission Date: 8/8/2023  Hospital Length of Stay: 3 days  Discharge Date and Time:  08/11/2023 1:29 PM  Attending Physician: Abdirizak Edwards MD   Discharging Provider: Abdirizak Edwards MD  Primary Care Provider: Genaro Womack MD    Primary Care Team: Networked reference to record PCT     HPI:   No notes on file    * No surgery found *      Hospital Course:   Patient is a 67-year-old gentleman with a history of coronary disease who was admitted with right upper quadrant abdominal pain concerns for biliary source of his pain versus atypical cardiac pain.  Was evaluated for gallbladder etiology and had HIDA scan which showed possible biliary dyskinesia however his liver function tests remained normal.  He underwent cardiac evaluation with stress testing which was negative for any inducible ischemia.  Cardiology has cleared him for holding his Plavix for 5 days if needed for any surgical intervention.  General surgery evaluated him and recommend outpatient follow-up if he was having continued symptoms he can have elective cholecystectomy at that time.  On my examination it appears the patient has a bruise at the area of his pain related to recent mechanical work on his car.  His symptoms improved during the time of his hospitalization.  He has been constipated as well and we were able to resolve this problem with an aggressive bowel regimen.  He can continue MiraLax over-the-counter for maintenance bowel regimen upon discharge.  I have seen and evaluated the patient the day of discharge and he was back to his baseline and feeling much better.  I reviewed strict return precautions.  He was discharged in good condition with plans for close outpatient follow-up.       Goals of Care Treatment Preferences:         Consults:   Consults (From admission,  onward)        Status Ordering Provider     Inpatient consult to General Surgery  Once        Provider:  Rudy Boyle MD    Completed KARY WORKMAN     Inpatient consult to Cardiology  Once        Provider:  Tc Ibrahim MD    Completed KARY WORKMAN.     Inpatient consult to Cardiology  Once        Provider:  Tc Ibrahim MD    Completed KARY WORKMAN.          No new Assessment & Plan notes have been filed under this hospital service since the last note was generated.  Service: Hospital Medicine    Final Active Diagnoses:    Diagnosis Date Noted POA    PRINCIPAL PROBLEM:  Abdominal pain [R10.9] 08/08/2023 Yes    NSTEMI (non-ST elevated myocardial infarction) [I21.4] 08/08/2023 Unknown    Paroxysmal atrial fibrillation [I48.0] 09/22/2022 Yes    Lung nodule [R91.1] 07/26/2022 Yes    Pulmonary fibrosis [J84.10] 07/21/2022 Yes    S/P CABG x 3, 2007 [Z95.1] 01/09/2019 Not Applicable    Reactive depression [F32.9] 01/09/2019 Yes    Hyperlipidemia, baseline  [E78.5] 02/08/2016 Yes    Bilateral hearing loss [H91.93] 02/08/2016 Yes    Obesity (BMI 30.0-34.9), today 30.7 [E66.9] 02/05/2016 Yes    Varicose veins of lower extremities with other complications [I83.893] 07/15/2013 Yes    CAD (coronary artery disease) [I25.10] 01/07/2013 Yes    HTN (hypertension) [I10] 01/07/2013 Yes    Tobacco dependence [F17.200] 01/07/2013 Yes      Problems Resolved During this Admission:       Discharged Condition: good    Disposition: Home or Self Care    Follow Up:   Follow-up Information     Genaro Womack MD. Call in 2 day(s).    Specialty: Family Medicine  Why: Office staff to call you with follow up appointment information.  If you do not hear from them by Monday 8/14, please call the office.  Contact information:  [address] [address]  [phone]             Rudy Boyle MD. Schedule an appointment as soon as possible for a visit in 2 week(s).    Specialties: General  Surgery, Colon and Rectal Surgery, Surgery  Contact information:  [address] [facility]  [address]  [place] [address]  [phone]                       Patient Instructions:      Diet Adult Regular     No dressing needed     Activity as tolerated       Significant Diagnostic Studies: Labs:   BMP:   Recent Labs   Lab 08/11/23 0927         K 3.7      CO2 27   BUN 8   CREATININE 0.7   CALCIUM 9.1   , CBC   Recent Labs   Lab 08/11/23 0927   WBC 8.19   HGB 13.8*   HCT 41.9       and All labs within the past 24 hours have been reviewed    Pending Diagnostic Studies:     None         Medications:  Reconciled Home Medications:      Medication List      START taking these medications    ciprofloxacin HCl 500 MG tablet  Commonly known as: CIPRO  Take 1 tablet (500 mg total) by mouth 2 (two) times daily. for 7 days     metroNIDAZOLE 500 MG tablet  Commonly known as: FLAGYL  Take 1 tablet (500 mg total) by mouth every 8 (eight) hours. for 7 days        CHANGE how you take these medications    EScitalopram oxalate 10 MG tablet  Commonly known as: LEXAPRO  Take 1 tablet (10 mg total) by mouth once daily.  What changed: when to take this     rosuvastatin 20 MG tablet  Commonly known as: CRESTOR  Take 1 tablet (20 mg total) by mouth once daily.  What changed: when to take this        CONTINUE taking these medications    * albuterol 90 mcg/actuation inhaler  Commonly known as: PROVENTIL HFA  Inhale 2 puffs into the lungs every 4 (four) hours as needed for Wheezing. Every 4-6 hours PRN     * albuterol 2.5 mg /3 mL (0.083 %) nebulizer solution  Commonly known as: PROVENTIL  Take 3 mLs (2.5 mg total) by nebulization every 6 (six) hours as needed for Wheezing. Rescue     ascorbic acid (vitamin C) 1000 MG tablet  Commonly known as: VITAMIN C  Take 1,000 mg by mouth once daily.     aspirin 81 MG EC tablet  Commonly known as: ECOTRIN  Take 81 mg by mouth once daily.     clopidogreL 75 mg tablet  Commonly known as:  PLAVIX  Take 1 tablet (75 mg total) by mouth once daily. Need office visit before next refill, last seen 1/2019. This will be the LAST Rx if visit is not made.     coenzyme Q10-vitamin E 100-100 mg-unit Cap  Take 200 mg by mouth once daily. Every day     CombiVENT RESPIMAT  mcg/actuation inhaler  Generic drug: ipratropium-albuteroL  Inhale 1 puff into the lungs every 6 (six) hours as needed for Wheezing. Rescue     ferrous sulfate 325 mg (65 mg iron) Tab tablet  Commonly known as: FEOSOL  Take 325 mg by mouth daily with breakfast.     FISH OIL ORAL  Take 1 capsule by mouth 2 (two) times daily as needed. Twice a day     metoprolol succinate 50 MG 24 hr tablet  Commonly known as: TOPROL-XL  Take 1 tablet by mouth in the evening     nicotine 14 mg/24 hr  Commonly known as: NICODERM CQ  Place 1 patch onto the skin once daily.     OFEV 150 mg Cap  Generic drug: nintedanib  Take 1 capsule (150 mg total) by mouth 2 (two) times a day.     traZODone 50 MG tablet  Commonly known as: DESYREL  Take 1 tablet (50 mg total) by mouth every evening.     varenicline 1 mg Tab  Commonly known as: CHANTIX  Take one-half tablet once a day for 3 days, then increase to one-half tablet twice a day for 4 days. Beginning on Day 8, take 1 tablet twice daily until complete         * This list has 2 medication(s) that are the same as other medications prescribed for you. Read the directions carefully, and ask your doctor or other care provider to review them with you.                Indwelling Lines/Drains at time of discharge:   Lines/Drains/Airways     None                 Time spent on the discharge of patient: 50 minutes         Abdirizak Edwards MD  Department of Hospital Medicine  [facility]

## 2023-08-11 NOTE — CARE UPDATE
08/11/23 0828   Patient Assessment/Suction   Level of Consciousness (AVPU) alert   Respiratory Effort Unlabored   All Lung Fields Breath Sounds diminished;clear   PRE-TX-O2   Device (Oxygen Therapy) nasal cannula   $ Is the patient on Low Flow Oxygen? Yes   Flow (L/min) 2   SpO2 (!) 92 %   Pulse Oximetry Type Intermittent   $ Pulse Oximetry - Multiple Charge Pulse Oximetry - Multiple   Pulse 78   Resp 15   Aerosol Therapy   $ Aerosol Therapy Charges Aerosol Treatment   Daily Review of Necessity (SVN) completed   Respiratory Treatment Status (SVN) given   Treatment Route (SVN) mask   Patient Position (SVN) semi-Wolf's   Post Treatment Assessment (SVN) increased aeration   Signs of Intolerance (SVN) none   Breath Sounds Post-Respiratory Treatment   Post-treatment Heart Rate (beats/min) 79   Post-treatment Resp Rate (breaths/min) 15   Education   $ Education Bronchodilator;15 min   Respiratory Evaluation   $ Care Plan Tech Time 15 min

## 2023-08-11 NOTE — PLAN OF CARE
DC orders and chart reviewed. No discharge needs noted.  Patient cleared for discharge from .  Patient discharging to home.  attempted to schedule PCP follow up appointment.  Office staff to call patient with appointment information.        08/11/23 1115   Final Note   Assessment Type Final Discharge Note   Anticipated Discharge Disposition Home   What phone number can be called within the next 1-3 days to see how you are doing after discharge? [phone]   Hospital Resources/Appts/Education Provided Provided patient/caregiver with written discharge plan information   Post-Acute Status   Discharge Delays None known at this time

## 2023-08-12 NOTE — CARE UPDATE
08/11/23 1946   Patient Assessment/Suction   Level of Consciousness (AVPU) alert   Respiratory Effort Normal;Unlabored   Expansion/Accessory Muscles/Retractions no use of accessory muscles   All Lung Fields Breath Sounds Anterior:;clear;diminished   Rhythm/Pattern, Respiratory pattern regular;unlabored   Cough Frequency no cough   PRE-TX-O2   Device (Oxygen Therapy) nasal cannula   $ Is the patient on Low Flow Oxygen? Yes   Flow (L/min) 2   SpO2 (!) 92 %   Pulse Oximetry Type Intermittent   $ Pulse Oximetry - Multiple Charge Pulse Oximetry - Multiple   Pulse 90   Resp 20   Positioning HOB elevated 45 degrees   Positioning   Head of Bed (HOB) Positioning HOB at 30-45 degrees   Positioning/Transfer Devices pillows;in use   Aerosol Therapy   $ Aerosol Therapy Charges Aerosol Treatment   Daily Review of Necessity (SVN) completed   Respiratory Treatment Status (SVN) given   Treatment Route (SVN) mask;oxygen   Patient Position (SVN) HOB elevated   Post Treatment Assessment (SVN) increased aeration   Signs of Intolerance (SVN) none   Education   $ Education Bronchodilator;DME Oxygen;15 min

## 2023-08-14 ENCOUNTER — OFFICE VISIT (OUTPATIENT)
Dept: PRIMARY CARE CLINIC | Facility: CLINIC | Age: 68
End: 2023-08-14
Payer: MEDICARE

## 2023-08-14 VITALS
TEMPERATURE: 98 F | WEIGHT: 178.13 LBS | HEIGHT: 71 IN | OXYGEN SATURATION: 92 % | DIASTOLIC BLOOD PRESSURE: 60 MMHG | HEART RATE: 79 BPM | BODY MASS INDEX: 24.94 KG/M2 | SYSTOLIC BLOOD PRESSURE: 110 MMHG

## 2023-08-14 DIAGNOSIS — F32.9 REACTIVE DEPRESSION: ICD-10-CM

## 2023-08-14 DIAGNOSIS — I25.10 CORONARY ARTERY DISEASE INVOLVING NATIVE CORONARY ARTERY WITHOUT ANGINA PECTORIS, UNSPECIFIED WHETHER NATIVE OR TRANSPLANTED HEART: ICD-10-CM

## 2023-08-14 DIAGNOSIS — E78.5 HYPERLIPIDEMIA, UNSPECIFIED HYPERLIPIDEMIA TYPE: ICD-10-CM

## 2023-08-14 DIAGNOSIS — Z09 HOSPITAL DISCHARGE FOLLOW-UP: Primary | ICD-10-CM

## 2023-08-14 DIAGNOSIS — I10 ESSENTIAL HYPERTENSION: ICD-10-CM

## 2023-08-14 DIAGNOSIS — Z95.1 S/P CABG X 3: ICD-10-CM

## 2023-08-14 DIAGNOSIS — G47.00 INSOMNIA, UNSPECIFIED TYPE: ICD-10-CM

## 2023-08-14 DIAGNOSIS — J84.10 PULMONARY FIBROSIS: ICD-10-CM

## 2023-08-14 DIAGNOSIS — B35.1 ONYCHOMYCOSIS: ICD-10-CM

## 2023-08-14 DIAGNOSIS — I73.9 PAD (PERIPHERAL ARTERY DISEASE): ICD-10-CM

## 2023-08-14 LAB — BACTERIA BLD CULT: NORMAL

## 2023-08-14 PROCEDURE — 1159F MED LIST DOCD IN RCRD: CPT | Mod: HCNC,CPTII,S$GLB, | Performed by: NURSE PRACTITIONER

## 2023-08-14 PROCEDURE — 1111F PR DISCHARGE MEDS RECONCILED W/ CURRENT OUTPATIENT MED LIST: ICD-10-PCS | Mod: HCNC,CPTII,S$GLB, | Performed by: NURSE PRACTITIONER

## 2023-08-14 PROCEDURE — 99999 PR PBB SHADOW E&M-EST. PATIENT-LVL IV: CPT | Mod: PBBFAC,HCNC,, | Performed by: NURSE PRACTITIONER

## 2023-08-14 PROCEDURE — 3078F PR MOST RECENT DIASTOLIC BLOOD PRESSURE < 80 MM HG: ICD-10-PCS | Mod: HCNC,CPTII,S$GLB, | Performed by: NURSE PRACTITIONER

## 2023-08-14 PROCEDURE — 1126F PR PAIN SEVERITY QUANTIFIED, NO PAIN PRESENT: ICD-10-PCS | Mod: HCNC,CPTII,S$GLB, | Performed by: NURSE PRACTITIONER

## 2023-08-14 PROCEDURE — 3078F DIAST BP <80 MM HG: CPT | Mod: HCNC,CPTII,S$GLB, | Performed by: NURSE PRACTITIONER

## 2023-08-14 PROCEDURE — 1159F PR MEDICATION LIST DOCUMENTED IN MEDICAL RECORD: ICD-10-PCS | Mod: HCNC,CPTII,S$GLB, | Performed by: NURSE PRACTITIONER

## 2023-08-14 PROCEDURE — 1111F DSCHRG MED/CURRENT MED MERGE: CPT | Mod: HCNC,CPTII,S$GLB, | Performed by: NURSE PRACTITIONER

## 2023-08-14 PROCEDURE — 1101F PT FALLS ASSESS-DOCD LE1/YR: CPT | Mod: HCNC,CPTII,S$GLB, | Performed by: NURSE PRACTITIONER

## 2023-08-14 PROCEDURE — 99496 TRANSJ CARE MGMT HIGH F2F 7D: CPT | Mod: HCNC,S$GLB,, | Performed by: NURSE PRACTITIONER

## 2023-08-14 PROCEDURE — 3074F PR MOST RECENT SYSTOLIC BLOOD PRESSURE < 130 MM HG: ICD-10-PCS | Mod: HCNC,CPTII,S$GLB, | Performed by: NURSE PRACTITIONER

## 2023-08-14 PROCEDURE — 3008F PR BODY MASS INDEX (BMI) DOCUMENTED: ICD-10-PCS | Mod: HCNC,CPTII,S$GLB, | Performed by: NURSE PRACTITIONER

## 2023-08-14 PROCEDURE — 3288F FALL RISK ASSESSMENT DOCD: CPT | Mod: HCNC,CPTII,S$GLB, | Performed by: NURSE PRACTITIONER

## 2023-08-14 PROCEDURE — 3008F BODY MASS INDEX DOCD: CPT | Mod: HCNC,CPTII,S$GLB, | Performed by: NURSE PRACTITIONER

## 2023-08-14 PROCEDURE — 3074F SYST BP LT 130 MM HG: CPT | Mod: HCNC,CPTII,S$GLB, | Performed by: NURSE PRACTITIONER

## 2023-08-14 PROCEDURE — 1101F PR PT FALLS ASSESS DOC 0-1 FALLS W/OUT INJ PAST YR: ICD-10-PCS | Mod: HCNC,CPTII,S$GLB, | Performed by: NURSE PRACTITIONER

## 2023-08-14 PROCEDURE — 1126F AMNT PAIN NOTED NONE PRSNT: CPT | Mod: HCNC,CPTII,S$GLB, | Performed by: NURSE PRACTITIONER

## 2023-08-14 PROCEDURE — 99999 PR PBB SHADOW E&M-EST. PATIENT-LVL IV: ICD-10-PCS | Mod: PBBFAC,HCNC,, | Performed by: NURSE PRACTITIONER

## 2023-08-14 PROCEDURE — 3288F PR FALLS RISK ASSESSMENT DOCUMENTED: ICD-10-PCS | Mod: HCNC,CPTII,S$GLB, | Performed by: NURSE PRACTITIONER

## 2023-08-14 PROCEDURE — 99496 TRANSITIONAL CARE MANAGE SERVICE 7 DAY DISCHARGE: ICD-10-PCS | Mod: HCNC,S$GLB,, | Performed by: NURSE PRACTITIONER

## 2023-08-14 RX ORDER — ROSUVASTATIN CALCIUM 20 MG/1
20 TABLET, COATED ORAL NIGHTLY
Qty: 90 TABLET | Refills: 0 | Status: SHIPPED | OUTPATIENT
Start: 2023-08-14 | End: 2023-11-07 | Stop reason: SDUPTHER

## 2023-08-14 RX ORDER — CICLOPIROX 80 MG/ML
SOLUTION TOPICAL NIGHTLY
Qty: 6.6 ML | Refills: 2 | Status: SHIPPED | OUTPATIENT
Start: 2023-08-14 | End: 2023-10-16 | Stop reason: SDUPTHER

## 2023-08-14 RX ORDER — METOPROLOL SUCCINATE 50 MG/1
50 TABLET, EXTENDED RELEASE ORAL NIGHTLY
Qty: 90 TABLET | Refills: 0 | Status: SHIPPED | OUTPATIENT
Start: 2023-08-14 | End: 2023-10-16 | Stop reason: SDUPTHER

## 2023-08-14 RX ORDER — CLOPIDOGREL BISULFATE 75 MG/1
75 TABLET ORAL DAILY
Qty: 90 TABLET | Refills: 0 | Status: SHIPPED | OUTPATIENT
Start: 2023-08-14 | End: 2023-10-16 | Stop reason: SDUPTHER

## 2023-08-14 RX ORDER — ESCITALOPRAM OXALATE 10 MG/1
10 TABLET ORAL DAILY
Qty: 90 TABLET | Refills: 0 | Status: SHIPPED | OUTPATIENT
Start: 2023-08-14 | End: 2023-10-16 | Stop reason: SDUPTHER

## 2023-08-14 NOTE — PROGRESS NOTES
Transitional Care Note  Subjective:       Patient ID: Srinivasa Oliver is a 67 y.o. male.  Chief Complaint: Transitional Care    Family and/or Caretaker present at visit?  Yes.  Diagnostic tests reviewed/disposition: No diagnosic tests pending after this hospitalization.  Disease/illness education: cholecystitis, angina   Home health/community services discussion/referrals: Patient does not have home health established from hospital visit.  They do not need home health.  If needed, we will set up home health for the patient.   Establishment or re-establishment of referral orders for community resources:  refer to podiatry .   Discussion with other health care providers: No discussion with other health care providers necessary.   Mr. Oliver is a 67 year old male with a history of HLD, CAD s/p CABG, severe pulmonary fibrosis, bronchiectasis,  chronic hypoxemic resp failure on Home O2@4L NC, and lung nodule who was admitted to the hospital for chest pain from 8/8-8/11 and is seen to today for hospital follow up. Pain was worked up for possible biliary source of his pain versus atypical cardiac pain.  He had a HIDA scan which showed possible biliary dyskinesia however his liver function tests remained normal.  He underwent cardiac evaluation with stress testing which was negative for any inducible ischemia. General surgery evaluated him and recommend outpatient follow-up if he was having continued symptoms he can have elective cholecystectomy at that time.  He was discharged home with cipro/flagyl. He reports after his constipation was treated the RUQ pain completely resolved. He has had regular soft Bms since. He is SOB, but did not bring his supplemental oxygen and is satting 87%. He is complaining of toenail fungus that OTC therapies have not been effective. He requests refills of his home meds. He also reports uncontrolled insomnia despite trazodone. Trouble initiating sleep and staying asleep, although wife  feels it's more frequent waking. He denies nocturia.       Review of Systems   Constitutional:  Negative for chills, diaphoresis, fatigue and fever.   HENT:  Negative for congestion, ear pain, sore throat and trouble swallowing.    Eyes:  Negative for pain, discharge and visual disturbance.   Respiratory:  Positive for shortness of breath. Negative for cough, chest tightness and wheezing.    Cardiovascular:  Negative for chest pain, palpitations and leg swelling.   Gastrointestinal:  Negative for abdominal distention, abdominal pain, blood in stool, constipation, diarrhea, nausea and vomiting.   Endocrine: Negative for polydipsia, polyphagia and polyuria.   Genitourinary:  Negative for dysuria, flank pain, frequency and urgency.   Musculoskeletal:  Negative for back pain, joint swelling, neck pain and neck stiffness.   Skin:  Negative for rash and wound.        Soft, yellow toenails   Allergic/Immunologic: Negative for immunocompromised state.   Neurological:  Negative for dizziness, syncope, speech difficulty, weakness, light-headedness, numbness and headaches.   Hematological:  Negative for adenopathy.   Psychiatric/Behavioral:  Negative for confusion and suicidal ideas. The patient is not nervous/anxious.    All other systems reviewed and are negative.      Objective:      Physical Exam  Vitals and nursing note reviewed.   Constitutional:       Appearance: He is well-developed.      Comments: Chronically ill appearing   HENT:      Head: Normocephalic and atraumatic.   Eyes:      Conjunctiva/sclera: Conjunctivae normal.      Pupils: Pupils are equal, round, and reactive to light.   Cardiovascular:      Rate and Rhythm: Normal rate and regular rhythm.      Heart sounds: Normal heart sounds.   Pulmonary:      Effort: Pulmonary effort is normal.      Breath sounds: Wheezing present.      Comments: Inspiratory and exp wheezes on right worse than left, diminished throughout  Abdominal:      General: Bowel sounds are  normal. There is no distension.      Palpations: Abdomen is soft.      Tenderness: There is no abdominal tenderness. There is no guarding.   Musculoskeletal:         General: Normal range of motion.      Cervical back: Normal range of motion and neck supple.   Skin:     General: Skin is warm and dry.      Capillary Refill: Capillary refill takes 2 to 3 seconds.      Comments: Clubbing of nails of fingers and toes, toenails are yellow and soft   Neurological:      Mental Status: He is alert and oriented to person, place, and time.   Psychiatric:         Behavior: Behavior normal.         Thought Content: Thought content normal.         Judgment: Judgment normal.         Assessment:       1. Hospital discharge follow-up    2. Pulmonary fibrosis    3. Onychomycosis    4. PAD (peripheral artery disease)    5. Essential hypertension    6. Coronary artery disease involving native coronary artery without angina pectoris, unspecified whether native or transplanted heart    7. Reactive depression    8. S/P CABG x 3, 2007    9. Hyperlipidemia, unspecified hyperlipidemia type    10. Insomnia, unspecified type        Plan:       Follow up with PCP - Dr. Womack - first available in October  Refills of metoprolol succinate 50mg daily, rosuvastatin 20mg daily, lexapro 10mg daily, clopidogrel 75mg daily provided for 90 days until he can see his PCP again  Requests something other than trazodone for sleep as he feels it does not work. Will trial low dose Belsomra and stop trazodone given age and chronic resp failure  Has Rx for nebs at home  Oral terbinafine and other antifungals for onychomycosis interact with SSRI with liver toxicity and QT prolongation risk, will start topical ciclopirox  for now and send outpt referral to podiatry  Educated to wear oxygen everywhere   CMP this week

## 2023-08-14 NOTE — PATIENT INSTRUCTIONS
Stop trazodone while taking Suvorexant   Do not drink alcohol or grapefruit juice while using suvorexant

## 2023-08-15 ENCOUNTER — TELEPHONE (OUTPATIENT)
Dept: FAMILY MEDICINE | Facility: CLINIC | Age: 68
End: 2023-08-15
Payer: MEDICARE

## 2023-08-15 ENCOUNTER — PATIENT OUTREACH (OUTPATIENT)
Dept: ADMINISTRATIVE | Facility: CLINIC | Age: 68
End: 2023-08-15
Payer: MEDICARE

## 2023-08-15 ENCOUNTER — LAB VISIT (OUTPATIENT)
Dept: LAB | Facility: HOSPITAL | Age: 68
End: 2023-08-15
Attending: NURSE PRACTITIONER
Payer: MEDICARE

## 2023-08-15 DIAGNOSIS — Z09 HOSPITAL DISCHARGE FOLLOW-UP: ICD-10-CM

## 2023-08-15 LAB
ALBUMIN SERPL BCP-MCNC: 3 G/DL (ref 3.5–5.2)
ALP SERPL-CCNC: 83 U/L (ref 55–135)
ALT SERPL W/O P-5'-P-CCNC: 8 U/L (ref 10–44)
ANION GAP SERPL CALC-SCNC: 10 MMOL/L (ref 8–16)
AST SERPL-CCNC: 14 U/L (ref 10–40)
BILIRUB SERPL-MCNC: 0.4 MG/DL (ref 0.1–1)
BUN SERPL-MCNC: 9 MG/DL (ref 8–23)
CALCIUM SERPL-MCNC: 9.5 MG/DL (ref 8.7–10.5)
CHLORIDE SERPL-SCNC: 105 MMOL/L (ref 95–110)
CO2 SERPL-SCNC: 24 MMOL/L (ref 23–29)
CREAT SERPL-MCNC: 0.8 MG/DL (ref 0.5–1.4)
EST. GFR  (NO RACE VARIABLE): >60 ML/MIN/1.73 M^2
GLUCOSE SERPL-MCNC: 104 MG/DL (ref 70–110)
POTASSIUM SERPL-SCNC: 3.7 MMOL/L (ref 3.5–5.1)
PROT SERPL-MCNC: 7.7 G/DL (ref 6–8.4)
SODIUM SERPL-SCNC: 139 MMOL/L (ref 136–145)

## 2023-08-15 PROCEDURE — 36415 COLL VENOUS BLD VENIPUNCTURE: CPT | Mod: HCNC,PO | Performed by: NURSE PRACTITIONER

## 2023-08-15 PROCEDURE — 80053 COMPREHEN METABOLIC PANEL: CPT | Mod: HCNC | Performed by: NURSE PRACTITIONER

## 2023-08-22 NOTE — PHYSICIAN QUERY
PT Name: Srinivasa Oliver  MR #: [id number]     DOCUMENTATION CLARIFICATION      CDS/: Paty Verma               Contact information:  This form is a permanent document in the medical record.    Query Date: August 22, 2023    By submitting this query, we are merely seeking further clarification of documentation to reflect the severity of illness of your patient. Please utilize your independent clinical judgment when addressing the question(s) below.     The Medical Record contains the following:   Indicators   Supporting Clinical Findings Location in Medical Record    Chest Pain, Angina No nausea no vomiting no chest pain or flank pain.    Denies chest pain or shortness of breath.  ED Note    Consults by Aaron Gutiérrez MD8/9/2023    Coronary Artery Disease CAD (coronary artery disease) Discharge Summary    EKG Sinus rhythm with Premature atrial complexes with Aberrant conduction   Possible Left atrial enlargement   Nonspecific ST abnormality   Abnormal ECG   When compared with ECG of 08-AUG-2023 13:20,   Aberrant conduction is now Present   T wave inversion less evident in Anterior leads  8/8    Troponin 0.118 8/8; 0.145 8/8; 80.5 8/9; 42.7 8/9      Troponin I 0.118, >0.145, troponin HS 80.5. CXR shows severe heterogeneous interstitial fibrosis and hypoventilation  Lab Results          Echo Results  Left Ventricle: The left ventricle is normal in size. Normal wall thickness. Normal wall motion. There is normal systolic function. Ejection fraction by visual approximation is 60%. There is normal diastolic function.   Left Atrium: Left atrium is moderately dilated. Left atrium elongated due to cardiac transplantation.   Right Ventricle: Moderate right ventricular enlargement. Wall thickness is normal. Right ventricle wall motion is normal. Systolic function is normal.   Right Atrium: Right atrium is moderately dilated.   Mitral Valve: Mild mitral annular calcification.   Tricuspid Valve: There is moderate  regurgitation. There is moderate pulmonary hypertension.   IVC/SVC: Normal venous pressure at 3 mmHg. 8/9    Nuclear Stress Test  The ECG portion of the study is negative for ischemia.   The patient reported no chest pain during the stress test.   The nuclear portion of this study will be reported separately.     Documentation of acute cardiac condition NSTEMI (non-ST elevated myocardial infarction) [I21.4] 08/08/2023 Unknown     Type 2 MI  Symptoms resolved and stress test is negative   H&P & Discharge Summary      Assessment & Plan Note by Abdirizak Edwards MD8/10/2023      Provider, please clarify the diagnosis related to the above documentation:  [   ] NSTEMI   [   ] NSTEMI/Myocardial Infarction Type 2 due to (please specify):       [   ] CAD with unstable angina without Acute Myocardial Infarction   [   ] Demand Ischemia   [  x ] Demand Ischemia without Acute Myocardial Infarction   [   ] Elevated troponin only (without corresponding diagnosis)   [   ] NSTEMI Ruled Out   [   ] Other Cardiac Diagnosis (please specify):         Present on admission (POA) status:  [ x  ] Yes (Y)   [   ] No (N)   [   ] Documentation insufficient to determine if condition is POA (U)   [   ] Clinically Undetermined (W)     Please document in your progress notes daily for the duration of treatment until resolved, and include in your discharge summary.    Form No. [id number]

## 2023-08-28 ENCOUNTER — CLINICAL SUPPORT (OUTPATIENT)
Dept: SMOKING CESSATION | Facility: CLINIC | Age: 68
End: 2023-08-28
Payer: COMMERCIAL

## 2023-08-28 DIAGNOSIS — F17.210 HEAVY CIGARETTE SMOKER (20-39 PER DAY): Primary | ICD-10-CM

## 2023-08-28 PROCEDURE — 99404 PREV MED CNSL INDIV APPRX 60: CPT | Mod: S$GLB,,,

## 2023-08-28 PROCEDURE — 99404 PR PREVENT COUNSEL,INDIV,60 MIN: ICD-10-PCS | Mod: S$GLB,,,

## 2023-08-28 RX ORDER — IBUPROFEN 200 MG
1 TABLET ORAL DAILY
Qty: 28 PATCH | Refills: 0 | Status: SHIPPED | OUTPATIENT
Start: 2023-08-28 | End: 2023-09-20 | Stop reason: SDUPTHER

## 2023-08-28 RX ORDER — IBUPROFEN 200 MG
1 TABLET ORAL DAILY
Qty: 28 PATCH | Refills: 0 | Status: SHIPPED | OUTPATIENT
Start: 2023-08-28 | End: 2023-08-28

## 2023-08-28 RX ORDER — BUPROPION HYDROCHLORIDE 150 MG/1
150 TABLET, EXTENDED RELEASE ORAL 2 TIMES DAILY
Qty: 60 TABLET | Refills: 0 | Status: SHIPPED | OUTPATIENT
Start: 2023-08-28 | End: 2023-09-20 | Stop reason: SDUPTHER

## 2023-08-28 NOTE — Clinical Note
Patient was seen for tobacco cessation re intake.  Patient will begin on 150 mg Wellbutrin BID and 21 mg nicotine patch.  Patient is smoking 1ppd. We discussed self awarness, triggers, tracking usage, reduction/treatment plan and follow up.

## 2023-08-31 NOTE — PATIENT INSTRUCTIONS
Nail Fungal Infection  A nail fungal infection changes the way fingernails and toenails look. They may thicken, discolor, change shape, or split. This condition is hard to treat because nails grow slowly and have limited blood supply. The infection often comes back after treatment.  There are 2 types of medicines used to treat this condition:  Topical anti-fungal medicines. These are applied to the surface of the skin and nail area. These medicines are not very effective because they cant get deep into the nail.  Oral antifungal medicines. These medicines work better because they go into the nail from the inside out. But the infection may still come back. It may take 9 to 12 months for your nail to look normal again. This means you are cured. You can repeat treatment if needed. Most people take these medicines without any problems. It is rare to stop therapy because of side effects. But your healthcare provider may give you some monitoring tests. Talk about possible side effects with your provider before starting treatment.  If medicines fail, the nail can be removed surgically or chemically. These methods physically remove the fungus from the body. This helps medical treatment be more effective.  Home care  Use medicines exactly as directed for as long as directed. Treating a fungal infection can take longer than other kinds of infections.  Smoking is a risk factor for fungal infection. This is one more reason to quit.  Wear absorbent socks, and shoes that let your feet breathe. Sweaty feet increase your risk of fungal infection. They also make an existing infection harder to treat.  Use footwear when in damp public places like swimming pools, gyms, and shower rooms. This will help you avoid the fungus that grows there.  Don't share nail clippers or scissors with others.  Follow-up care  Follow up with your healthcare provider, or as advised.  When to seek medical advice  Call your healthcare provider right away  if any of these occur:  Skin by the nail becomes red, swollen, painful, or drains pus (a creamy yellow or white liquid)  Side effects from oral anti-fungal medicines  Date Last Reviewed: 8/1/2016  © 3143-7331 [employer] [address] [place] [address]. All rights reserved. This information is not intended as a substitute for professional medical care. Always follow your healthcare professional's instructions.

## 2023-09-11 ENCOUNTER — OFFICE VISIT (OUTPATIENT)
Dept: PODIATRY | Facility: CLINIC | Age: 68
End: 2023-09-11
Payer: MEDICARE

## 2023-09-11 VITALS — OXYGEN SATURATION: 96 % | WEIGHT: 176.38 LBS | HEART RATE: 73 BPM | HEIGHT: 71 IN | BODY MASS INDEX: 24.69 KG/M2

## 2023-09-11 DIAGNOSIS — L60.3 ONYCHODYSTROPHY: ICD-10-CM

## 2023-09-11 DIAGNOSIS — B35.1 ONYCHOMYCOSIS: Primary | ICD-10-CM

## 2023-09-11 PROCEDURE — 1101F PR PT FALLS ASSESS DOC 0-1 FALLS W/OUT INJ PAST YR: ICD-10-PCS | Mod: HCNC,CPTII,S$GLB, | Performed by: PODIATRIST

## 2023-09-11 PROCEDURE — 99203 PR OFFICE/OUTPT VISIT, NEW, LEVL III, 30-44 MIN: ICD-10-PCS | Mod: S$GLB,,, | Performed by: PODIATRIST

## 2023-09-11 PROCEDURE — 1160F RVW MEDS BY RX/DR IN RCRD: CPT | Mod: HCNC,CPTII,S$GLB, | Performed by: PODIATRIST

## 2023-09-11 PROCEDURE — 1126F AMNT PAIN NOTED NONE PRSNT: CPT | Mod: HCNC,CPTII,S$GLB, | Performed by: PODIATRIST

## 2023-09-11 PROCEDURE — 3008F PR BODY MASS INDEX (BMI) DOCUMENTED: ICD-10-PCS | Mod: HCNC,CPTII,S$GLB, | Performed by: PODIATRIST

## 2023-09-11 PROCEDURE — 99203 OFFICE O/P NEW LOW 30 MIN: CPT | Mod: S$GLB,,, | Performed by: PODIATRIST

## 2023-09-11 PROCEDURE — 1159F PR MEDICATION LIST DOCUMENTED IN MEDICAL RECORD: ICD-10-PCS | Mod: HCNC,CPTII,S$GLB, | Performed by: PODIATRIST

## 2023-09-11 PROCEDURE — 3008F BODY MASS INDEX DOCD: CPT | Mod: HCNC,CPTII,S$GLB, | Performed by: PODIATRIST

## 2023-09-11 PROCEDURE — 3288F FALL RISK ASSESSMENT DOCD: CPT | Mod: HCNC,CPTII,S$GLB, | Performed by: PODIATRIST

## 2023-09-11 PROCEDURE — 1126F PR PAIN SEVERITY QUANTIFIED, NO PAIN PRESENT: ICD-10-PCS | Mod: HCNC,CPTII,S$GLB, | Performed by: PODIATRIST

## 2023-09-11 PROCEDURE — 1160F PR REVIEW ALL MEDS BY PRESCRIBER/CLIN PHARMACIST DOCUMENTED: ICD-10-PCS | Mod: HCNC,CPTII,S$GLB, | Performed by: PODIATRIST

## 2023-09-11 PROCEDURE — 99999 PR PBB SHADOW E&M-EST. PATIENT-LVL IV: ICD-10-PCS | Mod: PBBFAC,HCNC,, | Performed by: PODIATRIST

## 2023-09-11 PROCEDURE — 1101F PT FALLS ASSESS-DOCD LE1/YR: CPT | Mod: HCNC,CPTII,S$GLB, | Performed by: PODIATRIST

## 2023-09-11 PROCEDURE — 99999 PR PBB SHADOW E&M-EST. PATIENT-LVL IV: CPT | Mod: PBBFAC,HCNC,, | Performed by: PODIATRIST

## 2023-09-11 PROCEDURE — 3288F PR FALLS RISK ASSESSMENT DOCUMENTED: ICD-10-PCS | Mod: HCNC,CPTII,S$GLB, | Performed by: PODIATRIST

## 2023-09-11 PROCEDURE — 1159F MED LIST DOCD IN RCRD: CPT | Mod: HCNC,CPTII,S$GLB, | Performed by: PODIATRIST

## 2023-09-11 NOTE — PROGRESS NOTES
"  [address] [facility] [address] [place] [address]  Phone: [phone]   [phone]    Patient's PCP:Genaro Womack MD  Referring Provider: Hermila Casillas    Subjective:      Chief Complaint:: Fungus (Right foot 1st, 3rd,4th and 5th toe)    Fungus  Pertinent negatives include no abdominal pain, arthralgias, chest pain, chills, coughing, fatigue, fever, headaches, joint swelling, myalgias, nausea, neck pain, numbness, rash or weakness.       Srinivasa Oliver is a 67 y.o. male who presents today with a complaint of fungal toe nails. The current episode started 3 months ago.  The symptoms include discolortion of nails. Probable cause of complaint unknown.  The symptoms are aggravated by none. The problem has stayed the same. Treatment to date have included pedicures which provided no relief.  Patient was given Penlac by his primary doctor.  He states he has been using this daily for the past several weeks.        Vitals:    09/11/23 1430   Pulse: 73   SpO2: 96%   Weight: 80 kg (176 lb 5.9 oz)   Height: 5' 11" (1.803 m)   PainSc: 0-No pain      Shoe Size: 11.5    Past Surgical History:   Procedure Laterality Date    COLONOSCOPY  01/28/2011    Dr Choudhary, tubular adenoma    CORONARY ARTERY BYPASS GRAFT  2005    CORONARY STENT PLACEMENT      ENDOBRONCHIAL ULTRASOUND N/A 7/18/2022    Procedure: ENDOBRONCHIAL ULTRASOUND (EBUS);  Surgeon: Alli Sandra MD;  Location: [facility];  Service: Pulmonary;  Laterality: N/A;    NAVIGATIONAL BRONCHOSCOPY Bilateral 7/18/2022    Procedure: BRONCHOSCOPY, NAVIGATIONAL;  Surgeon: Alli Sandra MD;  Location: [facility];  Service: Pulmonary;  Laterality: Bilateral;     Past Medical History:   Diagnosis Date    Adenomatous colon polyp 01/28/2011    Adenomatous colon polyp     Anxiety     Blood transfusion     CHF (congestive heart failure)     Emphysema of lung     Hypertension     Renal disorder     kidney stones    S/P coronary artery stent placement 01/07/2013    Staph skin " infection     abd/ lanced x 2    Ulcer      Family History   Problem Relation Age of Onset    Heart disease Mother     Heart disease Father     Diabetes Father     Heart disease Maternal Aunt     No Known Problems Sister     No Known Problems Brother     No Known Problems Daughter     No Known Problems Son         Social History:   Marital Status:   Alcohol History:  reports no history of alcohol use.  Tobacco History:  reports that he has quit smoking. His smoking use included cigarettes. He started smoking about 47 years ago. He has a 36.8 pack-year smoking history. He has never used smokeless tobacco.  Drug History:  reports no history of drug use.    Review of patient's allergies indicates:   Allergen Reactions    Atorvastatin      Other reaction(s): Rash       Current Outpatient Medications   Medication Sig Dispense Refill    albuterol (PROVENTIL HFA) 90 mcg/actuation inhaler Inhale 2 puffs into the lungs every 4 (four) hours as needed for Wheezing. Every 4-6 hours PRN 18 g 6    albuterol (PROVENTIL) 2.5 mg /3 mL (0.083 %) nebulizer solution Take 3 mLs (2.5 mg total) by nebulization every 6 (six) hours as needed for Wheezing. Rescue 120 mL 11    ascorbic acid, vitamin C, (VITAMIN C) 1000 MG tablet Take 1,000 mg by mouth once daily.      aspirin (ECOTRIN) 81 MG EC tablet Take 81 mg by mouth once daily.      buPROPion (WELLBUTRIN SR) 150 MG TBSR 12 hr tablet Take 1 tablet once a day for 7 days then increase to twice a day 60 tablet 0    ciclopirox (PENLAC) 8 % Soln Apply topically nightly. Paint on affected toenails and surrounding skin 6.6 mL 2    clopidogreL (PLAVIX) 75 mg tablet Take 1 tablet (75 mg total) by mouth once daily. Need office visit before next refill, last seen 1/2019. This will be the LAST Rx if visit is not made. 90 tablet 0    coenzyme Q10-vitamin E 100-100 mg-unit Cap Take 200 mg by mouth once daily. Every day      DOCOSAHEXANOIC ACID/EPA (FISH OIL ORAL) Take 1 capsule by mouth 2 (two)  times daily as needed. Twice a day      EScitalopram oxalate (LEXAPRO) 10 MG tablet Take 1 tablet (10 mg total) by mouth once daily. 90 tablet 0    ferrous sulfate (FEOSOL) 325 mg (65 mg iron) Tab tablet Take 325 mg by mouth daily with breakfast.      ipratropium-albuteroL (COMBIVENT RESPIMAT)  mcg/actuation inhaler Inhale 1 puff into the lungs every 6 (six) hours as needed for Wheezing. Rescue      metoprolol succinate (TOPROL-XL) 50 MG 24 hr tablet Take 1 tablet (50 mg total) by mouth every evening. 90 tablet 0    nicotine (NICODERM CQ) 21 mg/24 hr Place 1 patch onto the skin once daily. 28 patch 0    nintedanib (OFEV) 150 mg Cap Take 1 capsule (150 mg total) by mouth 2 (two) times a day. 60 capsule 11    rosuvastatin (CRESTOR) 20 MG tablet Take 1 tablet (20 mg total) by mouth every evening. 90 tablet 0    suvorexant (BELSOMRA) 5 mg Tab Take 5 mg by mouth every evening. 30 tablet 2    traZODone (DESYREL) 50 MG tablet Take 1 tablet (50 mg total) by mouth every evening. 90 tablet 3    varenicline (CHANTIX) 1 mg Tab Take one-half tablet once a day for 3 days, then increase to one-half tablet twice a day for 4 days. Beginning on Day 8, take 1 tablet twice daily until complete 56 tablet 0     No current facility-administered medications for this visit.       Review of Systems   Constitutional:  Negative for chills, fatigue, fever and unexpected weight change.   HENT:  Negative for hearing loss and trouble swallowing.    Eyes:  Negative for photophobia and visual disturbance.   Respiratory:  Positive for shortness of breath. Negative for cough and wheezing.    Cardiovascular:  Negative for chest pain, palpitations and leg swelling.   Gastrointestinal:  Negative for abdominal pain and nausea.   Genitourinary:  Negative for dysuria and frequency.   Musculoskeletal:  Negative for arthralgias, back pain, gait problem, joint swelling, myalgias and neck pain.   Skin:  Positive for color change. Negative for rash and  wound.   Neurological:  Negative for tremors, seizures, weakness, numbness and headaches.   Hematological:  Does not bruise/bleed easily.         Objective:        Physical Exam:   Foot Exam    General  Orientation: alert and oriented to person, place, and time   Affect: appropriate   Gait: unimpaired       Right Foot/Ankle     Inspection and Palpation  Ecchymosis: none  Tenderness: none   Swelling: none   Arch: normal  Skin Exam: skin intact; no drainage, no ulcer and no erythema   Fungus Toenails: present    Neurovascular  Dorsalis pedis: 2+  Posterior tibial: 2+  Capillary Refill: 2+  Varicose veins: not present  Saphenous nerve sensation: normal  Tibial nerve sensation: normal  Superficial peroneal nerve sensation: normal  Deep peroneal nerve sensation: normal  Sural nerve sensation: normal    Edema  Type of edema: non-pitting    Muscle Strength  Ankle dorsiflexion: 5  Ankle plantar flexion: 5  Ankle inversion: 5  Ankle eversion: 5  Great toe extension: 5  Great toe flexion: 5    Range of Motion    Normal right ankle ROM    Tests  Anterior drawer: negative   Talar tilt: negative   PT Tinel's sign: negative    Paresthesia: negative  Comments  Nails 1, 3, 4, and 5 are thickened discolored and dystrophic.  No tenderness to palpation.        Physical Exam  Cardiovascular:      Pulses:           Dorsalis pedis pulses are 2+ on the right side.        Posterior tibial pulses are 2+ on the right side.   Feet:      Right foot:      Skin integrity: No ulcer or erythema.      Toenail Condition: Fungal disease present.              Right Ankle/Foot Exam     Range of Motion   The patient has normal right ankle ROM.    Comments:  Nails 1, 3, 4, and 5 are thickened discolored and dystrophic.  No tenderness to palpation.        Muscle Strength   Right Lower Extremity   Ankle Dorsiflexion:  5   Plantar flexion:  5/5    Vascular Exam     Right Pulses  Dorsalis Pedis:      2+  Posterior Tibial:      2+           Imaging: none             Assessment:       1. Onychomycosis    2. Onychodystrophy      Plan:   Onychomycosis  -     Ambulatory referral/consult to Podiatry    Onychodystrophy      Follow up if symptoms worsen or fail to improve.    Procedures        Fungal infection of toenails explained. Treatment options including no treatment, periodic debridement, topical medications, oral medications, and removal of the nail were discussed, as well as success rates and risks of recurrence.     I discussed with the patient that given his other comorbidities I would not recommend either of the oral antifungal medications.  I believe that he should continue with the Penlac given by his primary.  I did discussed with him that it can take upwards of 12 months to see improvement in the nails and stress the importance of continuing to use the medicine daily.    Counseling:     I provided patient education verbally regarding:   Patient diagnosis, treatment options, as well as alternatives, risks, and benefits.     This note was created using Dragon voice recognition software that occasionally misinterpreted phrases or words.

## 2023-09-20 ENCOUNTER — CLINICAL SUPPORT (OUTPATIENT)
Dept: SMOKING CESSATION | Facility: CLINIC | Age: 68
End: 2023-09-20
Payer: COMMERCIAL

## 2023-09-20 DIAGNOSIS — F17.210 HEAVY CIGARETTE SMOKER (20-39 PER DAY): ICD-10-CM

## 2023-09-20 PROCEDURE — 99999 PR PBB SHADOW E&M-EST. PATIENT-LVL II: ICD-10-PCS | Mod: PBBFAC,,,

## 2023-09-20 PROCEDURE — 99404 PREV MED CNSL INDIV APPRX 60: CPT | Mod: S$GLB,,,

## 2023-09-20 PROCEDURE — 99999 PR PBB SHADOW E&M-EST. PATIENT-LVL II: CPT | Mod: PBBFAC,,,

## 2023-09-20 PROCEDURE — 99404 PR PREVENT COUNSEL,INDIV,60 MIN: ICD-10-PCS | Mod: S$GLB,,,

## 2023-09-20 RX ORDER — BUPROPION HYDROCHLORIDE 150 MG/1
150 TABLET, EXTENDED RELEASE ORAL 2 TIMES DAILY
Qty: 60 TABLET | Refills: 0 | Status: SHIPPED | OUTPATIENT
Start: 2023-09-20 | End: 2024-09-19

## 2023-09-20 RX ORDER — IBUPROFEN 200 MG
1 TABLET ORAL DAILY
Qty: 28 PATCH | Refills: 0 | Status: SHIPPED | OUTPATIENT
Start: 2023-09-20

## 2023-09-20 NOTE — Clinical Note
Patient is smoking between 6-20 cpd depending on the day. Patient will rate fade to 10 cpd or less.   Patient denies smoking increase from stress.  Patient continue to use 150 mg Wellbutrin BID and 21 mg nicotine patch daily without any negative side effects at this time.  Completion of TCRS (Tobacco Cessation Rating Scale) learned addiction model, cues/triggers, personal reasons/motivation for quitting, willpower, medications, goals, quit date.The patient denies any abnormal behavioral or mental changes at this time. The patient will continue with  therapy sessions and medication monitoring by CTTS. Prescribed medication management will be by physician. The patient denies any abnormal behavioral or mental changes at this time. The patient will continue with  therapy sessions and medication monitoring by CTTS. Prescribed medication management will be by physician.

## 2023-09-20 NOTE — PROGRESS NOTES
Individual Follow-Up Form    9/20/2023    Quit Date:     Clinical Status of Patient: Outpatient    Continuing Medication: yes  Wellbutrin    Other Medications: nicotine patch      Target Symptoms: Withdrawal and medication side effects. The following were  rated moderate (3) to severe (4) on TCRS:  Moderate (3): none  Severe (4): none    Comments: Patient is smoking between 6-20 cpd depending on the day. Patient will rate fade to 10 cpd or less.   Patient denies smoking increase from stress.  Patient continue to use 150 mg Wellbutrin BID and 21 mg nicotine patch daily without any negative side effects at this time.  Completion of TCRS (Tobacco Cessation Rating Scale) learned addiction model, cues/triggers, personal reasons/motivation for quitting, willpower, medications, goals, quit date.The patient denies any abnormal behavioral or mental changes at this time. The patient will continue with  therapy sessions and medication monitoring by CTTS. Prescribed medication management will be by physician. The patient denies any abnormal behavioral or mental changes at this time. The patient will continue with  therapy sessions and medication monitoring by CTTS. Prescribed medication management will be by physician.      Diagnosis: F17.210    Next Visit: 2 weeks

## 2023-10-11 ENCOUNTER — TELEPHONE (OUTPATIENT)
Dept: SMOKING CESSATION | Facility: CLINIC | Age: 68
End: 2023-10-11
Payer: MEDICARE

## 2023-10-11 NOTE — TELEPHONE ENCOUNTER
Telephoned patient for follow up after missed appt.  Patient was unavailable.  Voicemail was full.

## 2023-10-16 ENCOUNTER — OFFICE VISIT (OUTPATIENT)
Dept: FAMILY MEDICINE | Facility: CLINIC | Age: 68
End: 2023-10-16
Attending: FAMILY MEDICINE
Payer: MEDICARE

## 2023-10-16 VITALS
SYSTOLIC BLOOD PRESSURE: 110 MMHG | TEMPERATURE: 98 F | DIASTOLIC BLOOD PRESSURE: 70 MMHG | HEART RATE: 68 BPM | BODY MASS INDEX: 24.94 KG/M2 | RESPIRATION RATE: 17 BRPM | HEIGHT: 71 IN | WEIGHT: 178.13 LBS | OXYGEN SATURATION: 90 %

## 2023-10-16 DIAGNOSIS — I48.0 PAROXYSMAL ATRIAL FIBRILLATION: ICD-10-CM

## 2023-10-16 DIAGNOSIS — Z86.010 ENCOUNTER FOR COLONOSCOPY DUE TO HISTORY OF ADENOMATOUS COLONIC POLYPS: ICD-10-CM

## 2023-10-16 DIAGNOSIS — Z12.11 COLON CANCER SCREENING: ICD-10-CM

## 2023-10-16 DIAGNOSIS — F17.200 TOBACCO DEPENDENCE: ICD-10-CM

## 2023-10-16 DIAGNOSIS — Z00.00 ENCOUNTER FOR PREVENTIVE HEALTH EXAMINATION: Primary | ICD-10-CM

## 2023-10-16 DIAGNOSIS — Z87.891 PERSONAL HISTORY OF NICOTINE DEPENDENCE: ICD-10-CM

## 2023-10-16 DIAGNOSIS — E78.2 MIXED HYPERLIPIDEMIA: ICD-10-CM

## 2023-10-16 DIAGNOSIS — Z12.5 PROSTATE CANCER SCREENING: ICD-10-CM

## 2023-10-16 DIAGNOSIS — J47.9 BRONCHIECTASIS WITHOUT COMPLICATION: ICD-10-CM

## 2023-10-16 DIAGNOSIS — I10 PRIMARY HYPERTENSION: ICD-10-CM

## 2023-10-16 DIAGNOSIS — Z12.11 ENCOUNTER FOR COLONOSCOPY DUE TO HISTORY OF ADENOMATOUS COLONIC POLYPS: ICD-10-CM

## 2023-10-16 DIAGNOSIS — I25.10 CORONARY ARTERY DISEASE INVOLVING NATIVE CORONARY ARTERY OF NATIVE HEART WITHOUT ANGINA PECTORIS: ICD-10-CM

## 2023-10-16 DIAGNOSIS — J84.10 PULMONARY FIBROSIS: ICD-10-CM

## 2023-10-16 DIAGNOSIS — I25.10 CORONARY ARTERY DISEASE INVOLVING NATIVE CORONARY ARTERY WITHOUT ANGINA PECTORIS, UNSPECIFIED WHETHER NATIVE OR TRANSPLANTED HEART: ICD-10-CM

## 2023-10-16 DIAGNOSIS — G47.00 INSOMNIA, UNSPECIFIED TYPE: ICD-10-CM

## 2023-10-16 DIAGNOSIS — B35.1 ONYCHOMYCOSIS: ICD-10-CM

## 2023-10-16 DIAGNOSIS — F17.210 NICOTINE DEPENDENCE, CIGARETTES, UNCOMPLICATED: ICD-10-CM

## 2023-10-16 DIAGNOSIS — F32.9 REACTIVE DEPRESSION: ICD-10-CM

## 2023-10-16 DIAGNOSIS — Z95.1 S/P CABG X 3: ICD-10-CM

## 2023-10-16 DIAGNOSIS — I10 ESSENTIAL HYPERTENSION: ICD-10-CM

## 2023-10-16 DIAGNOSIS — J43.2 CENTRILOBULAR EMPHYSEMA: ICD-10-CM

## 2023-10-16 DIAGNOSIS — I27.20 PULMONARY HTN: ICD-10-CM

## 2023-10-16 DIAGNOSIS — K80.20 CHOLELITHIASIS WITHOUT CHOLECYSTITIS: ICD-10-CM

## 2023-10-16 DIAGNOSIS — I73.9 PAD (PERIPHERAL ARTERY DISEASE): ICD-10-CM

## 2023-10-16 PROCEDURE — 99999 PR PBB SHADOW E&M-EST. PATIENT-LVL IV: CPT | Mod: PBBFAC,HCNC,, | Performed by: FAMILY MEDICINE

## 2023-10-16 PROCEDURE — 90694 FLU VACCINE - QUADRIVALENT - ADJUVANTED: ICD-10-PCS | Mod: HCNC,S$GLB,, | Performed by: FAMILY MEDICINE

## 2023-10-16 PROCEDURE — 1126F PR PAIN SEVERITY QUANTIFIED, NO PAIN PRESENT: ICD-10-PCS | Mod: HCNC,CPTII,S$GLB, | Performed by: FAMILY MEDICINE

## 2023-10-16 PROCEDURE — 99397 PER PM REEVAL EST PAT 65+ YR: CPT | Mod: HCNC,S$GLB,, | Performed by: FAMILY MEDICINE

## 2023-10-16 PROCEDURE — 3078F DIAST BP <80 MM HG: CPT | Mod: HCNC,CPTII,S$GLB, | Performed by: FAMILY MEDICINE

## 2023-10-16 PROCEDURE — 1159F MED LIST DOCD IN RCRD: CPT | Mod: HCNC,CPTII,S$GLB, | Performed by: FAMILY MEDICINE

## 2023-10-16 PROCEDURE — 1160F PR REVIEW ALL MEDS BY PRESCRIBER/CLIN PHARMACIST DOCUMENTED: ICD-10-PCS | Mod: HCNC,CPTII,S$GLB, | Performed by: FAMILY MEDICINE

## 2023-10-16 PROCEDURE — 99999 PR PBB SHADOW E&M-EST. PATIENT-LVL IV: ICD-10-PCS | Mod: PBBFAC,HCNC,, | Performed by: FAMILY MEDICINE

## 2023-10-16 PROCEDURE — 99397 PR PREVENTIVE VISIT,EST,65 & OVER: ICD-10-PCS | Mod: HCNC,S$GLB,, | Performed by: FAMILY MEDICINE

## 2023-10-16 PROCEDURE — 3074F SYST BP LT 130 MM HG: CPT | Mod: HCNC,CPTII,S$GLB, | Performed by: FAMILY MEDICINE

## 2023-10-16 PROCEDURE — 1101F PR PT FALLS ASSESS DOC 0-1 FALLS W/OUT INJ PAST YR: ICD-10-PCS | Mod: HCNC,CPTII,S$GLB, | Performed by: FAMILY MEDICINE

## 2023-10-16 PROCEDURE — 3078F PR MOST RECENT DIASTOLIC BLOOD PRESSURE < 80 MM HG: ICD-10-PCS | Mod: HCNC,CPTII,S$GLB, | Performed by: FAMILY MEDICINE

## 2023-10-16 PROCEDURE — G0008 ADMIN INFLUENZA VIRUS VAC: HCPCS | Mod: HCNC,S$GLB,, | Performed by: FAMILY MEDICINE

## 2023-10-16 PROCEDURE — 3074F PR MOST RECENT SYSTOLIC BLOOD PRESSURE < 130 MM HG: ICD-10-PCS | Mod: HCNC,CPTII,S$GLB, | Performed by: FAMILY MEDICINE

## 2023-10-16 PROCEDURE — 1101F PT FALLS ASSESS-DOCD LE1/YR: CPT | Mod: HCNC,CPTII,S$GLB, | Performed by: FAMILY MEDICINE

## 2023-10-16 PROCEDURE — 1159F PR MEDICATION LIST DOCUMENTED IN MEDICAL RECORD: ICD-10-PCS | Mod: HCNC,CPTII,S$GLB, | Performed by: FAMILY MEDICINE

## 2023-10-16 PROCEDURE — 3288F FALL RISK ASSESSMENT DOCD: CPT | Mod: HCNC,CPTII,S$GLB, | Performed by: FAMILY MEDICINE

## 2023-10-16 PROCEDURE — 3008F BODY MASS INDEX DOCD: CPT | Mod: HCNC,CPTII,S$GLB, | Performed by: FAMILY MEDICINE

## 2023-10-16 PROCEDURE — 3288F PR FALLS RISK ASSESSMENT DOCUMENTED: ICD-10-PCS | Mod: HCNC,CPTII,S$GLB, | Performed by: FAMILY MEDICINE

## 2023-10-16 PROCEDURE — 3008F PR BODY MASS INDEX (BMI) DOCUMENTED: ICD-10-PCS | Mod: HCNC,CPTII,S$GLB, | Performed by: FAMILY MEDICINE

## 2023-10-16 PROCEDURE — 90694 VACC AIIV4 NO PRSRV 0.5ML IM: CPT | Mod: HCNC,S$GLB,, | Performed by: FAMILY MEDICINE

## 2023-10-16 PROCEDURE — G0008 FLU VACCINE - QUADRIVALENT - ADJUVANTED: ICD-10-PCS | Mod: HCNC,S$GLB,, | Performed by: FAMILY MEDICINE

## 2023-10-16 PROCEDURE — 1126F AMNT PAIN NOTED NONE PRSNT: CPT | Mod: HCNC,CPTII,S$GLB, | Performed by: FAMILY MEDICINE

## 2023-10-16 PROCEDURE — 1160F RVW MEDS BY RX/DR IN RCRD: CPT | Mod: HCNC,CPTII,S$GLB, | Performed by: FAMILY MEDICINE

## 2023-10-16 RX ORDER — ALBUTEROL SULFATE 90 UG/1
2 AEROSOL, METERED RESPIRATORY (INHALATION) EVERY 4 HOURS PRN
Qty: 18 G | Refills: 6 | Status: SHIPPED | OUTPATIENT
Start: 2023-10-16

## 2023-10-16 RX ORDER — ESCITALOPRAM OXALATE 10 MG/1
10 TABLET ORAL DAILY
Qty: 90 TABLET | Refills: 3 | Status: SHIPPED | OUTPATIENT
Start: 2023-10-16

## 2023-10-16 RX ORDER — METOPROLOL SUCCINATE 50 MG/1
50 TABLET, EXTENDED RELEASE ORAL NIGHTLY
Qty: 90 TABLET | Refills: 3 | Status: SHIPPED | OUTPATIENT
Start: 2023-10-16

## 2023-10-16 RX ORDER — MIRTAZAPINE 7.5 MG/1
7.5 TABLET, FILM COATED ORAL NIGHTLY
Qty: 30 TABLET | Refills: 11 | Status: SHIPPED | OUTPATIENT
Start: 2023-10-16 | End: 2024-10-15

## 2023-10-16 RX ORDER — ALBUTEROL SULFATE 0.83 MG/ML
2.5 SOLUTION RESPIRATORY (INHALATION) EVERY 6 HOURS PRN
Qty: 120 ML | Refills: 11 | Status: SHIPPED | OUTPATIENT
Start: 2023-10-16 | End: 2024-10-15

## 2023-10-16 RX ORDER — CLOPIDOGREL BISULFATE 75 MG/1
75 TABLET ORAL DAILY
Qty: 90 TABLET | Refills: 3 | Status: SHIPPED | OUTPATIENT
Start: 2023-10-16

## 2023-10-16 RX ORDER — CICLOPIROX 80 MG/ML
SOLUTION TOPICAL NIGHTLY
Qty: 6.6 ML | Refills: 2 | Status: SHIPPED | OUTPATIENT
Start: 2023-10-16 | End: 2024-10-15

## 2023-10-16 NOTE — PROGRESS NOTES
Subjective:       Patient ID: Srinivasa Oliver is a 67 y.o. male.    Chief Complaint: Annual Exam (Pt states that he is here for his annual exam )    67-year-old male coming in for annual physical.  In August he was briefly admitted to the hospital with a right upper quadrant pain and had an extensive workup including cardiology clearance finding multiple gallstones but no evidence of cholecystitis or obstruction and he eventually improved with a laxative resolving his chronic constipation.  He has a history of emphysema with pulmonary fibrosis and nodules followed by Dr. Flowers in Pulmonary, coronary artery disease with previous non ST elevation MI and three-vessel bypass surgery in 2007 followed by Cardiology.  He is hard of hearing with hypertension, hyperlipidemia, paroxysmal atrial fibrillation, varicose veins, and a history of adenomatous colon polyps.  His last colonoscopy was done by Dr. Chuodhary in January of 2011 with a five year recheck long overdue.  He is willing to undergo colonoscopy.  He is due for a low-dose CT scan.  He is due for a flu vaccine and will take that today.  He is due for a lipid and a PSA as well as some follow-ups on abnormalities in the hospital.  He has been going to the smoking cessation Clinic currently on Nicoderm CQ patches 21 mg daily with Wellbutrin  b.i.d. but continues to smoke a half a pack a day.  He feels that the Nicoderm 14 so that he got in the hospital worked better than the 20 ones that he has now.  The patches look physically different he will discuss the problem with the smoking cessation Clinic adviser.  He has chronic insomnia and was given Belsomra on discharge from the hospital but it was not covered on his insurance and was too expensive.  He has used trazodone and other agents in the past not finding of them particularly helpful but he tried a few of his wife's mirtazapine 7.5 mg and did find that they helped him sleep quite a bit.  He needs refills  for most of his medications.    Past Medical History:  01/28/2011: Adenomatous colon polyp  No date: Adenomatous colon polyp  No date: Anxiety  No date: Blood transfusion  10/16/2023: Centrilobular emphysema  No date: CHF (congestive heart failure)  No date: Emphysema of lung  No date: Hypertension  No date: Renal disorder      Comment:  kidney stones  01/07/2013: S/P coronary artery stent placement  No date: Staph skin infection      Comment:  abd/ lanced x 2  No date: Ulcer    Past Surgical History:  01/28/2011: COLONOSCOPY      Comment:  Dr Choudhary, tubular adenoma  2005: CORONARY ARTERY BYPASS GRAFT  No date: CORONARY STENT PLACEMENT  7/18/2022: ENDOBRONCHIAL ULTRASOUND; N/A      Comment:  Procedure: ENDOBRONCHIAL ULTRASOUND (EBUS);  Surgeon:                Alli Sandra MD;  Location: [facility];  Service:                Pulmonary;  Laterality: N/A;  7/18/2022: NAVIGATIONAL BRONCHOSCOPY; Bilateral      Comment:  Procedure: BRONCHOSCOPY, NAVIGATIONAL;  Surgeon: Alli Sandra MD;  Location: [facility];  Service: Pulmonary;                 Laterality: Bilateral;    Review of patient's family history indicates:  Problem: Heart disease      Relation: Mother          Age of Onset: (Not Specified)  Problem: Heart disease      Relation: Father          Age of Onset: (Not Specified)  Problem: Diabetes      Relation: Father          Age of Onset: (Not Specified)  Problem: Heart disease      Relation: Maternal Aunt          Age of Onset: (Not Specified)  Problem: No Known Problems      Relation: Sister          Age of Onset: (Not Specified)  Problem: No Known Problems      Relation: Brother          Age of Onset: (Not Specified)  Problem: No Known Problems      Relation: Daughter          Age of Onset: (Not Specified)  Problem: No Known Problems      Relation: Son          Age of Onset: (Not Specified)    Social History    Tobacco Use      Smoking status: Former        Packs/day: 1.00        Years: 0.8  packs/day for 47.4 years (36.9 ttl pk-yrs)        Types: Cigarettes        Start date: 5/31/1976      Smokeless tobacco: Never      Tobacco comments: Pt currently smoking 1ppd. Ready to quit, has a patch on while admitted for cravings. Has made multiple attempts to quit and has started again. Inpatient smoking cessation done 8/9/23. Pt is a [employer] Member: [id number]    Alcohol use: No      Alcohol/week: 0.0 standard drinks of alcohol    Drug use: No    Current Outpatient Medications on File Prior to Visit:  ascorbic acid, vitamin C, (VITAMIN C) 1000 MG tablet, Take 1,000 mg by mouth once daily., Disp: , Rfl:   aspirin (ECOTRIN) 81 MG EC tablet, Take 81 mg by mouth once daily., Disp: , Rfl:   buPROPion (WELLBUTRIN SR) 150 MG TBSR 12 hr tablet, Take 1 tablet once a day for 7 days then increase to twice a day, Disp: 60 tablet, Rfl: 0  coenzyme Q10-vitamin E 100-100 mg-unit Cap, Take 200 mg by mouth once daily. Every day, Disp: , Rfl:   DOCOSAHEXANOIC ACID/EPA (FISH OIL ORAL), Take 1 capsule by mouth 2 (two) times daily as needed. Twice a day, Disp: , Rfl:   ferrous sulfate (FEOSOL) 325 mg (65 mg iron) Tab tablet, Take 325 mg by mouth daily with breakfast., Disp: , Rfl:   ipratropium-albuteroL (COMBIVENT RESPIMAT)  mcg/actuation inhaler, Inhale 1 puff into the lungs every 6 (six) hours as needed for Wheezing. Rescue, Disp: , Rfl:   nicotine (NICODERM CQ) 21 mg/24 hr, Place 1 patch onto the skin once daily., Disp: 28 patch, Rfl: 0  rosuvastatin (CRESTOR) 20 MG tablet, Take 1 tablet (20 mg total) by mouth every evening., Disp: 90 tablet, Rfl: 0  [DISCONTINUED] albuterol (PROVENTIL HFA) 90 mcg/actuation inhaler, Inhale 2 puffs into the lungs every 4 (four) hours as needed for Wheezing. Every 4-6 hours PRN, Disp: 18 g, Rfl: 6  [DISCONTINUED] ciclopirox (PENLAC) 8 % Soln, Apply topically nightly. Paint on affected toenails and surrounding skin, Disp: 6.6 mL, Rfl: 2  [DISCONTINUED] clopidogreL (PLAVIX) 75 mg  tablet, Take 1 tablet (75 mg total) by mouth once daily. Need office visit before next refill, last seen 1/2019. This will be the LAST Rx if visit is not made., Disp: 90 tablet, Rfl: 0  [DISCONTINUED] EScitalopram oxalate (LEXAPRO) 10 MG tablet, Take 1 tablet (10 mg total) by mouth once daily., Disp: 90 tablet, Rfl: 0  [DISCONTINUED] metoprolol succinate (TOPROL-XL) 50 MG 24 hr tablet, Take 1 tablet (50 mg total) by mouth every evening., Disp: 90 tablet, Rfl: 0  [DISCONTINUED] albuterol (PROVENTIL) 2.5 mg /3 mL (0.083 %) nebulizer solution, Take 3 mLs (2.5 mg total) by nebulization every 6 (six) hours as needed for Wheezing. Rescue, Disp: 120 mL, Rfl: 11  [DISCONTINUED] nintedanib (OFEV) 150 mg Cap, Take 1 capsule (150 mg total) by mouth 2 (two) times a day. (Patient not taking: Reported on 10/16/2023), Disp: 60 capsule, Rfl: 11  [DISCONTINUED] suvorexant (BELSOMRA) 5 mg Tab, Take 5 mg by mouth every evening. (Patient not taking: Reported on 10/16/2023), Disp: 30 tablet, Rfl: 2  [DISCONTINUED] traZODone (DESYREL) 50 MG tablet, Take 1 tablet (50 mg total) by mouth every evening. (Patient not taking: Reported on 10/16/2023), Disp: 90 tablet, Rfl: 3  [DISCONTINUED] varenicline (CHANTIX) 1 mg Tab, Take one-half tablet once a day for 3 days, then increase to one-half tablet twice a day for 4 days. Beginning on Day 8, take 1 tablet twice daily until complete (Patient not taking: Reported on 10/16/2023), Disp: 56 tablet, Rfl: 0    No current facility-administered medications on file prior to visit.          Review of Systems   Constitutional:  Negative for activity change, chills, fatigue and fever.   HENT:  Negative for congestion, ear pain, rhinorrhea and sore throat.    Eyes:  Negative for visual disturbance.   Respiratory:  Positive for cough and shortness of breath. Negative for chest tightness.    Cardiovascular:  Negative for chest pain and palpitations.   Gastrointestinal:  Negative for abdominal pain, blood in  stool, constipation, diarrhea, nausea and vomiting.   Genitourinary:  Negative for difficulty urinating, dysuria and hematuria.   Musculoskeletal:  Negative for arthralgias and neck pain.   Skin:  Negative for rash.   Neurological:  Negative for dizziness and headaches.   Psychiatric/Behavioral:  Positive for sleep disturbance.        Objective:      Physical Exam  Vitals and nursing note reviewed.   Constitutional:       General: He is not in acute distress.     Appearance: Normal appearance. He is well-developed and normal weight. He is not ill-appearing, toxic-appearing or diaphoretic.      Comments: Very good blood pressure control   Normal pulse with regular rhythm   Good weight with a BMI of 24.8 he is up 7.7 lb from his last visit with me Mamta 15, 2022  Borderline tachypneic with a pulse ox of 90% on room air   HENT:      Head: Normocephalic and atraumatic.      Right Ear: Tympanic membrane, ear canal and external ear normal. There is no impacted cerumen.      Left Ear: Tympanic membrane, ear canal and external ear normal. There is no impacted cerumen.      Nose: Nose normal. No congestion or rhinorrhea.      Mouth/Throat:      Mouth: Mucous membranes are moist.      Pharynx: Oropharynx is clear. No oropharyngeal exudate or posterior oropharyngeal erythema.   Eyes:      General: No scleral icterus.        Right eye: No discharge.         Left eye: No discharge.      Extraocular Movements: Extraocular movements intact.      Conjunctiva/sclera: Conjunctivae normal.      Pupils: Pupils are equal, round, and reactive to light.   Neck:      Thyroid: No thyromegaly.      Vascular: No carotid bruit or JVD.      Trachea: No tracheal deviation.   Cardiovascular:      Rate and Rhythm: Normal rate and regular rhythm.      Heart sounds: Normal heart sounds. No murmur heard.     No friction rub. No gallop.   Pulmonary:      Effort: Pulmonary effort is normal. Prolonged expiration present. No respiratory distress.       Breath sounds: Examination of the right-upper field reveals wheezing and rhonchi. Examination of the left-upper field reveals wheezing and rhonchi. Examination of the right-middle field reveals wheezing and rhonchi. Examination of the left-middle field reveals wheezing and rhonchi. Examination of the right-lower field reveals wheezing and rhonchi. Examination of the left-lower field reveals wheezing and rhonchi. Wheezing and rhonchi present. No decreased breath sounds or rales.   Chest:      Chest wall: No tenderness or crepitus. There is no dullness to percussion.   Abdominal:      General: Bowel sounds are normal. There is no distension.      Palpations: Abdomen is soft. There is no mass.      Tenderness: There is no abdominal tenderness. There is no guarding or rebound.   Musculoskeletal:         General: Normal range of motion.      Cervical back: Normal range of motion and neck supple. No rigidity or tenderness.      Right lower leg: No edema (Trace).      Left lower leg: No edema (Trace).   Lymphadenopathy:      Cervical: No cervical adenopathy.   Skin:     General: Skin is warm and dry.      Coloration: Skin is not jaundiced or pale.      Findings: No bruising, erythema, lesion or rash.   Neurological:      General: No focal deficit present.      Mental Status: He is alert and oriented to person, place, and time. Mental status is at baseline.      Cranial Nerves: No cranial nerve deficit.      Sensory: No sensory deficit.      Motor: No weakness.      Coordination: Coordination normal.      Gait: Gait normal.      Deep Tendon Reflexes: Reflexes are normal and symmetric. Reflexes normal.   Psychiatric:         Mood and Affect: Mood normal.         Behavior: Behavior normal.         Thought Content: Thought content normal.         Assessment:       1. Encounter for preventive health examination    2. Coronary artery disease involving native coronary artery of native heart without angina pectoris    3. S/P CABG  x 3, 2007    4. Paroxysmal atrial fibrillation    5. Primary hypertension    6. Mixed hyperlipidemia    7. Cholelithiasis without cholecystitis    8. Tobacco dependence    9. Encounter for colonoscopy due to history of adenomatous colonic polyps    10. Pulmonary fibrosis    11. Colon cancer screening    12. Prostate cancer screening    13. Nicotine dependence, cigarettes, uncomplicated    14. Insomnia, unspecified type    15. Personal history of nicotine dependence     16. PAD (peripheral artery disease)    17. Essential hypertension    18. Bronchiectasis without complication    19. Onychomycosis    20. Coronary artery disease involving native coronary artery without angina pectoris, unspecified whether native or transplanted heart    21. Reactive depression    22. Pulmonary HTN    23. Centrilobular emphysema    24. BMI 24.0-24.9, adult        Plan:       1. Encounter for preventive health examination    2. Coronary artery disease involving native coronary artery of native heart without angina pectoris  Asymptomatic, recently cleared by Cardiology    3. S/P CABG x 3, 2007  See above    4. Paroxysmal atrial fibrillation  Currently normal sinus rhythm    5. Primary hypertension  Good control no changes needed  - Lipid Panel; Future  - Comprehensive Metabolic Panel; Future  - CBC Auto Differential; Future    6. Mixed hyperlipidemia  Await lipid panel results  - Lipid Panel; Future  - Comprehensive Metabolic Panel; Future    7. Cholelithiasis without cholecystitis  Appears to be asymptomatic, questionable whether they had anything to do with his constipation    8. Tobacco dependence  Due for CT low-dose screen  - CT Chest Lung Screening Low Dose; Future    9. Encounter for colonoscopy due to history of adenomatous colonic polyps  Case requested, patient indicates he will agree  - Case Request Endoscopy: COLONOSCOPY    10. Pulmonary fibrosis  Followed by Pulmonary    11. Colon cancer screening  See number Ninh above  -  Case Request Endoscopy: COLONOSCOPY    12. Prostate cancer screening  Await PSA  - PSA, Screening; Future    13. Nicotine dependence, cigarettes, uncomplicated  See above  - CT Chest Lung Screening Low Dose; Future    14. Insomnia, unspecified type  Seem to see a good response to low-dose mirtazapine.  Will get his own prescriptions so he does not have to borrow his wife's.  He had poor response to trazodone  - mirtazapine (REMERON) 7.5 MG Tab; Take 1 tablet (7.5 mg total) by mouth every evening.  Dispense: 30 tablet; Refill: 11    15. Personal history of nicotine dependence   Refill rescue inhaler  - albuterol (PROVENTIL HFA) 90 mcg/actuation inhaler; Inhale 2 puffs into the lungs every 4 (four) hours as needed for Wheezing. Every 4-6 hours PRN  Dispense: 18 g; Refill: 6    16. PAD (peripheral artery disease)  Refill Plavix  - clopidogreL (PLAVIX) 75 mg tablet; Take 1 tablet (75 mg total) by mouth once daily. Need office visit before next refill, last seen 1/2019. This will be the LAST Rx if visit is not made.  Dispense: 90 tablet; Refill: 3    17. Essential hypertension  Controlled, no changes needed  - albuterol (PROVENTIL HFA) 90 mcg/actuation inhaler; Inhale 2 puffs into the lungs every 4 (four) hours as needed for Wheezing. Every 4-6 hours PRN  Dispense: 18 g; Refill: 6  - clopidogreL (PLAVIX) 75 mg tablet; Take 1 tablet (75 mg total) by mouth once daily. Need office visit before next refill, last seen 1/2019. This will be the LAST Rx if visit is not made.  Dispense: 90 tablet; Refill: 3  - metoprolol succinate (TOPROL-XL) 50 MG 24 hr tablet; Take 1 tablet (50 mg total) by mouth every evening.  Dispense: 90 tablet; Refill: 3    18. Bronchiectasis without complication  Followed by Pulmonary  - albuterol (PROVENTIL) 2.5 mg /3 mL (0.083 %) nebulizer solution; Take 3 mLs (2.5 mg total) by nebulization every 6 (six) hours as needed for Wheezing. Rescue  Dispense: 120 mL; Refill: 11    19. Onychomycosis  Needs  refill of [place]  - ciclopirox (PENLAC) 8 % Soln; Apply topically nightly. Paint on affected toenails and surrounding skin  Dispense: 6.6 mL; Refill: 2    20. Coronary artery disease involving native coronary artery without angina pectoris, unspecified whether native or transplanted heart  Followed by Cardiology  - clopidogreL (PLAVIX) 75 mg tablet; Take 1 tablet (75 mg total) by mouth once daily. Need office visit before next refill, last seen 1/2019. This will be the LAST Rx if visit is not made.  Dispense: 90 tablet; Refill: 3    21. Reactive depression  Refill Lexapro  - EScitalopram oxalate (LEXAPRO) 10 MG tablet; Take 1 tablet (10 mg total) by mouth once daily.  Dispense: 90 tablet; Refill: 3    22. Pulmonary HTN  Followed by Pulmonary    23. Centrilobular emphysema  Positive findings on CT chest with restrictive pattern on pulmonary function testing, followed by Pulmonary    24. BMI 24.0-24.9, adult  Good weight no changes needed

## 2023-10-17 ENCOUNTER — LAB VISIT (OUTPATIENT)
Dept: LAB | Facility: HOSPITAL | Age: 68
End: 2023-10-17
Attending: FAMILY MEDICINE
Payer: MEDICARE

## 2023-10-17 DIAGNOSIS — I10 PRIMARY HYPERTENSION: ICD-10-CM

## 2023-10-17 DIAGNOSIS — E78.2 MIXED HYPERLIPIDEMIA: ICD-10-CM

## 2023-10-17 DIAGNOSIS — Z12.5 PROSTATE CANCER SCREENING: ICD-10-CM

## 2023-10-17 LAB
ALBUMIN SERPL BCP-MCNC: 3.4 G/DL (ref 3.5–5.2)
ALP SERPL-CCNC: 117 U/L (ref 55–135)
ALT SERPL W/O P-5'-P-CCNC: 7 U/L (ref 10–44)
ANION GAP SERPL CALC-SCNC: 9 MMOL/L (ref 8–16)
AST SERPL-CCNC: 17 U/L (ref 10–40)
BASOPHILS # BLD AUTO: 0.03 K/UL (ref 0–0.2)
BASOPHILS NFR BLD: 0.4 % (ref 0–1.9)
BILIRUB SERPL-MCNC: 0.6 MG/DL (ref 0.1–1)
BUN SERPL-MCNC: 10 MG/DL (ref 8–23)
CALCIUM SERPL-MCNC: 9.8 MG/DL (ref 8.7–10.5)
CHLORIDE SERPL-SCNC: 107 MMOL/L (ref 95–110)
CHOLEST SERPL-MCNC: 123 MG/DL (ref 120–199)
CHOLEST/HDLC SERPL: 3.2 {RATIO} (ref 2–5)
CO2 SERPL-SCNC: 24 MMOL/L (ref 23–29)
COMPLEXED PSA SERPL-MCNC: 1.3 NG/ML (ref 0–4)
CREAT SERPL-MCNC: 0.8 MG/DL (ref 0.5–1.4)
DIFFERENTIAL METHOD: ABNORMAL
EOSINOPHIL # BLD AUTO: 0.1 K/UL (ref 0–0.5)
EOSINOPHIL NFR BLD: 1.1 % (ref 0–8)
ERYTHROCYTE [DISTWIDTH] IN BLOOD BY AUTOMATED COUNT: 14.6 % (ref 11.5–14.5)
EST. GFR  (NO RACE VARIABLE): >60 ML/MIN/1.73 M^2
GLUCOSE SERPL-MCNC: 108 MG/DL (ref 70–110)
HCT VFR BLD AUTO: 48.6 % (ref 40–54)
HDLC SERPL-MCNC: 38 MG/DL (ref 40–75)
HDLC SERPL: 30.9 % (ref 20–50)
HGB BLD-MCNC: 15.7 G/DL (ref 14–18)
IMM GRANULOCYTES # BLD AUTO: 0.02 K/UL (ref 0–0.04)
IMM GRANULOCYTES NFR BLD AUTO: 0.3 % (ref 0–0.5)
LDLC SERPL CALC-MCNC: 61.6 MG/DL (ref 63–159)
LYMPHOCYTES # BLD AUTO: 1.2 K/UL (ref 1–4.8)
LYMPHOCYTES NFR BLD: 16.5 % (ref 18–48)
MCH RBC QN AUTO: 32.4 PG (ref 27–31)
MCHC RBC AUTO-ENTMCNC: 32.3 G/DL (ref 32–36)
MCV RBC AUTO: 100 FL (ref 82–98)
MONOCYTES # BLD AUTO: 0.6 K/UL (ref 0.3–1)
MONOCYTES NFR BLD: 7.8 % (ref 4–15)
NEUTROPHILS # BLD AUTO: 5.2 K/UL (ref 1.8–7.7)
NEUTROPHILS NFR BLD: 73.9 % (ref 38–73)
NONHDLC SERPL-MCNC: 85 MG/DL
NRBC BLD-RTO: 0 /100 WBC
PLATELET # BLD AUTO: 189 K/UL (ref 150–450)
PMV BLD AUTO: 10.4 FL (ref 9.2–12.9)
POTASSIUM SERPL-SCNC: 4.2 MMOL/L (ref 3.5–5.1)
PROT SERPL-MCNC: 8.1 G/DL (ref 6–8.4)
RBC # BLD AUTO: 4.84 M/UL (ref 4.6–6.2)
SODIUM SERPL-SCNC: 140 MMOL/L (ref 136–145)
TRIGL SERPL-MCNC: 117 MG/DL (ref 30–150)
WBC # BLD AUTO: 7.09 K/UL (ref 3.9–12.7)

## 2023-10-17 PROCEDURE — 36415 COLL VENOUS BLD VENIPUNCTURE: CPT | Mod: HCNC,PO | Performed by: FAMILY MEDICINE

## 2023-10-17 PROCEDURE — 80061 LIPID PANEL: CPT | Mod: HCNC | Performed by: FAMILY MEDICINE

## 2023-10-17 PROCEDURE — 85025 COMPLETE CBC W/AUTO DIFF WBC: CPT | Mod: HCNC | Performed by: FAMILY MEDICINE

## 2023-10-17 PROCEDURE — 84153 ASSAY OF PSA TOTAL: CPT | Mod: HCNC | Performed by: FAMILY MEDICINE

## 2023-10-17 PROCEDURE — 80053 COMPREHEN METABOLIC PANEL: CPT | Mod: HCNC | Performed by: FAMILY MEDICINE

## 2023-10-23 ENCOUNTER — PATIENT MESSAGE (OUTPATIENT)
Dept: GASTROENTEROLOGY | Facility: CLINIC | Age: 68
End: 2023-10-23
Payer: MEDICARE

## 2023-11-07 DIAGNOSIS — E78.5 HYPERLIPIDEMIA, UNSPECIFIED HYPERLIPIDEMIA TYPE: ICD-10-CM

## 2023-11-07 DIAGNOSIS — I25.10 CORONARY ARTERY DISEASE INVOLVING NATIVE CORONARY ARTERY WITHOUT ANGINA PECTORIS, UNSPECIFIED WHETHER NATIVE OR TRANSPLANTED HEART: ICD-10-CM

## 2023-11-07 DIAGNOSIS — Z95.1 S/P CABG X 3: ICD-10-CM

## 2023-11-07 DIAGNOSIS — I73.9 PAD (PERIPHERAL ARTERY DISEASE): ICD-10-CM

## 2023-11-07 RX ORDER — ROSUVASTATIN CALCIUM 20 MG/1
20 TABLET, COATED ORAL NIGHTLY
Qty: 90 TABLET | Refills: 3 | Status: SHIPPED | OUTPATIENT
Start: 2023-11-07

## 2023-11-07 NOTE — TELEPHONE ENCOUNTER
No care due was identified.  [facility] Embedded Care Due Messages. Reference number: [id number].   11/07/2023 8:45:42 AM CST

## 2023-11-08 ENCOUNTER — TELEPHONE (OUTPATIENT)
Dept: SMOKING CESSATION | Facility: CLINIC | Age: 68
End: 2023-11-08
Payer: MEDICARE

## 2023-11-08 NOTE — TELEPHONE ENCOUNTER
1st attempt left message regarding smoking cessation 12 month telephone follow up for quit 2 episode. PT voicemail box full thus unable to leave voicemail.

## 2023-11-09 ENCOUNTER — TELEPHONE (OUTPATIENT)
Dept: SMOKING CESSATION | Facility: CLINIC | Age: 68
End: 2023-11-09
Payer: MEDICARE

## 2023-11-13 PROBLEM — I21.4 NSTEMI (NON-ST ELEVATED MYOCARDIAL INFARCTION): Status: RESOLVED | Noted: 2023-08-08 | Resolved: 2023-11-13

## 2024-01-25 ENCOUNTER — PATIENT MESSAGE (OUTPATIENT)
Dept: GASTROENTEROLOGY | Facility: CLINIC | Age: 69
End: 2024-01-25
Payer: MEDICARE

## 2024-01-31 ENCOUNTER — TELEPHONE (OUTPATIENT)
Dept: GASTROENTEROLOGY | Facility: CLINIC | Age: 69
End: 2024-01-31
Payer: MEDICARE

## 2024-02-22 ENCOUNTER — CLINICAL SUPPORT (OUTPATIENT)
Dept: SMOKING CESSATION | Facility: CLINIC | Age: 69
End: 2024-02-22
Payer: COMMERCIAL

## 2024-02-22 DIAGNOSIS — F17.200 NICOTINE DEPENDENCE: Primary | ICD-10-CM

## 2024-02-22 PROCEDURE — 99999 PR PBB SHADOW E&M-EST. PATIENT-LVL I: CPT | Mod: PBBFAC,,, | Performed by: DIETITIAN, REGISTERED

## 2024-02-22 PROCEDURE — 99406 BEHAV CHNG SMOKING 3-10 MIN: CPT | Mod: S$GLB,,, | Performed by: DIETITIAN, REGISTERED

## 2024-02-22 NOTE — PROGRESS NOTES
Spoke with patient's wife today in regard to pt's smoking cessation progress for 6 month telephone follow up, she states he is currently not tobacco free and is not currently actively trying to cut down. Informed patient's wife of pt's of benefit period, future follow ups, and contact information if any further help or support is needed. Will complete smart form for 6 month follow up for Quit attempt #3.

## 2024-08-21 ENCOUNTER — OFFICE VISIT (OUTPATIENT)
Dept: FAMILY MEDICINE | Facility: CLINIC | Age: 69
End: 2024-08-21
Payer: MEDICARE

## 2024-08-21 ENCOUNTER — TELEPHONE (OUTPATIENT)
Dept: SMOKING CESSATION | Facility: CLINIC | Age: 69
End: 2024-08-21
Payer: MEDICARE

## 2024-08-21 VITALS
WEIGHT: 170.44 LBS | OXYGEN SATURATION: 85 % | BODY MASS INDEX: 23.86 KG/M2 | SYSTOLIC BLOOD PRESSURE: 120 MMHG | HEIGHT: 71 IN | DIASTOLIC BLOOD PRESSURE: 60 MMHG | RESPIRATION RATE: 18 BRPM | HEART RATE: 80 BPM

## 2024-08-21 DIAGNOSIS — I73.9 PAD (PERIPHERAL ARTERY DISEASE): ICD-10-CM

## 2024-08-21 DIAGNOSIS — J43.2 CENTRILOBULAR EMPHYSEMA: ICD-10-CM

## 2024-08-21 DIAGNOSIS — D69.2 SENILE PURPURA: ICD-10-CM

## 2024-08-21 DIAGNOSIS — J96.11 CHRONIC HYPOXEMIC RESPIRATORY FAILURE: ICD-10-CM

## 2024-08-21 DIAGNOSIS — I27.20 PULMONARY HTN: ICD-10-CM

## 2024-08-21 DIAGNOSIS — R41.3 MEMORY IMPAIRMENT: ICD-10-CM

## 2024-08-21 DIAGNOSIS — R41.3 OTHER AMNESIA: ICD-10-CM

## 2024-08-21 DIAGNOSIS — Z74.09 OTHER REDUCED MOBILITY: ICD-10-CM

## 2024-08-21 DIAGNOSIS — I10 ESSENTIAL HYPERTENSION: ICD-10-CM

## 2024-08-21 DIAGNOSIS — Z00.00 ENCOUNTER FOR PREVENTIVE HEALTH EXAMINATION: Primary | ICD-10-CM

## 2024-08-21 DIAGNOSIS — I48.0 PAROXYSMAL ATRIAL FIBRILLATION: ICD-10-CM

## 2024-08-21 DIAGNOSIS — J84.10 PULMONARY FIBROSIS: ICD-10-CM

## 2024-08-21 DIAGNOSIS — E78.2 MIXED HYPERLIPIDEMIA: ICD-10-CM

## 2024-08-21 DIAGNOSIS — H91.93 BILATERAL HEARING LOSS, UNSPECIFIED HEARING LOSS TYPE: ICD-10-CM

## 2024-08-21 DIAGNOSIS — Z99.81 DEPENDENCE ON SUPPLEMENTAL OXYGEN: ICD-10-CM

## 2024-08-21 PROCEDURE — 1159F MED LIST DOCD IN RCRD: CPT | Mod: HCNC,CPTII,S$GLB,

## 2024-08-21 PROCEDURE — 3074F SYST BP LT 130 MM HG: CPT | Mod: HCNC,CPTII,S$GLB,

## 2024-08-21 PROCEDURE — 1126F AMNT PAIN NOTED NONE PRSNT: CPT | Mod: HCNC,CPTII,S$GLB,

## 2024-08-21 PROCEDURE — 3288F FALL RISK ASSESSMENT DOCD: CPT | Mod: HCNC,CPTII,S$GLB,

## 2024-08-21 PROCEDURE — 1158F ADVNC CARE PLAN TLK DOCD: CPT | Mod: HCNC,CPTII,S$GLB,

## 2024-08-21 PROCEDURE — 99999 PR PBB SHADOW E&M-EST. PATIENT-LVL V: CPT | Mod: PBBFAC,HCNC,,

## 2024-08-21 PROCEDURE — 3078F DIAST BP <80 MM HG: CPT | Mod: HCNC,CPTII,S$GLB,

## 2024-08-21 PROCEDURE — 1170F FXNL STATUS ASSESSED: CPT | Mod: HCNC,CPTII,S$GLB,

## 2024-08-21 PROCEDURE — 1101F PT FALLS ASSESS-DOCD LE1/YR: CPT | Mod: HCNC,CPTII,S$GLB,

## 2024-08-21 PROCEDURE — G0439 PPPS, SUBSEQ VISIT: HCPCS | Mod: HCNC,S$GLB,,

## 2024-08-21 RX ORDER — SILDENAFIL CITRATE 20 MG/1
20 TABLET ORAL 3 TIMES DAILY
Qty: 90 TABLET | Refills: 2 | Status: ACTIVE | OUTPATIENT
Start: 2024-08-21 | End: 2024-08-23

## 2024-08-21 NOTE — PATIENT INSTRUCTIONS
Carlin Bernabe,     If you are due for any health screening(s) below please notify me so we can arrange them to be ordered and scheduled to maintain your health. Most healthy patients complete it. Don't lose out on improving your health.     Tests to Keep You Healthy    Colon Cancer Screening: DUE  Last Blood Pressure <= 139/89 (8/21/2024): Yes         Colon Cancer Screening    Of cancers affecting both men and women, colorectal cancer is the third leading cancer killer in the [place]. But it doesnt have to be. Screening can prevent colorectal cancer or find it at an early stage when treatment often leads to a cure.    A colonoscopy is the preferred test for detecting colon cancer. It is needed only once every 10 years if results are negative. While sedated, a flexible, lighted tube with a tiny camera is inserted into the rectum and advanced through the colon to look for cancers. An alternative screening test that is used at home and returned to the lab may also be used. It detects hidden blood in bowel movements which could indicate cancer in the colon. If results are positive, you will need a colonoscopy to determine if the blood is a sign of cancer. This type of follow up (diagnostic) colonoscopy usually requires additional copays as required by your insurance provider. Please contact your PCP if you have any questions.                     Counseling and Referral of Other Preventative  (Italic type indicates deductible and co-insurance are waived)    Patient Name: Srinivasa Oliver  Today's Date: 8/21/2024    Health Maintenance       Date Due Completion Date    Shingles Vaccine (1 of 2) Never done ---    RSV Vaccine (Age 60+ and Pregnant patients) (1 - 1-dose 60+ series) Never done ---    Colorectal Cancer Screening 01/28/2016 1/28/2011    COVID-19 Vaccine (4 - 2023-24 season) 09/01/2023 1/11/2022    Influenza Vaccine (1) 09/01/2024 10/16/2023    Aspirin/Antiplatelet Therapy 10/16/2024 10/16/2023     PROSTATE-SPECIFIC ANTIGEN 10/17/2024 10/17/2023    Lipid Panel 10/17/2024 10/17/2023    High Dose Statin 11/07/2024 11/7/2023    TETANUS VACCINE 11/13/2025 11/13/2015        Orders Placed This Encounter   Procedures    MRI Brain Without Contrast    TSH    Vitamin B1    Vitamin B12    Folate    Treponema Pallidium Antibodies IgG, IgM    Ambulatory referral/consult to ENT    Ambulatory referral/consult to Audiology    Ambulatory referral/consult to Adult Neuropsychology    Ambulatory referral/consult to Neurology       The following information is provided to all patients.  This information is to help you find resources for any of the problems found today that may be affecting your health:                  Living healthy guide: www.[facility].[email or web address].[facility]      Understanding Diabetes: www.diabetes.org      Eating healthy: www.cdc.gov/healthyweight      CDC home safety checklist: www.cdc.gov/steadi/patient.html      Agency on Aging: www.goea.[place].[facility]      Alcoholics anonymous (AA): www.aa.org      Physical Activity: www.nate.nih.gov/kx4iedw      Tobacco use: www.quitwithusla.org

## 2024-08-21 NOTE — PROGRESS NOTES
"  Srinivasa Oliver presented for a  Medicare AWV and comprehensive Health Risk Assessment today. The following components were reviewed and updated:    Medical history  Family History  Social history  Allergies and Current Medications  Health Risk Assessment  Health Maintenance  Care Team         ** See Completed Assessments for Annual Wellness Visit within the encounter summary.**         The following assessments were completed:  Living Situation  CAGE  Depression Screening  Timed Get Up and Go  Whisper Test  Cognitive Function Screening  Nutrition Screening  ADL Screening  PAQ Screening      Opioid documentation:      Patient does not have a current opioid prescription.        Vitals:    08/21/24 1413   BP: 120/60   BP Location: Right arm   Patient Position: Sitting   BP Method: Large (Manual)   Pulse: 80   Resp: 18   SpO2: (!) 85%   Weight: 77.3 kg (170 lb 6.7 oz)   Height: 5' 11" (1.803 m)     Body mass index is 23.77 kg/m².  Physical Exam  Vitals reviewed.   Constitutional:       General: He is not in acute distress.     Appearance: Normal appearance. He is normal weight. He is not ill-appearing, toxic-appearing or diaphoretic.   HENT:      Head: Normocephalic.      Right Ear: External ear normal.      Left Ear: External ear normal.      Nose: Nose normal. No congestion or rhinorrhea.      Mouth/Throat:      Mouth: Mucous membranes are moist.      Pharynx: Oropharynx is clear.   Eyes:      General: No scleral icterus.        Right eye: No discharge.         Left eye: No discharge.      Extraocular Movements: Extraocular movements intact.      Conjunctiva/sclera: Conjunctivae normal.   Cardiovascular:      Rate and Rhythm: Normal rate and regular rhythm.      Pulses: Normal pulses.      Heart sounds: Normal heart sounds. No murmur heard.     No friction rub. No gallop.   Pulmonary:      Effort: No respiratory distress.      Comments: Increase respiratory effort. Decreased breath sounds throughout "   Musculoskeletal:         General: No swelling, tenderness or deformity. Normal range of motion.      Cervical back: Normal range of motion.      Right lower leg: No edema.      Left lower leg: No edema.   Skin:     General: Skin is warm and dry.      Capillary Refill: Capillary refill takes less than 2 seconds.      Coloration: Skin is not jaundiced.      Findings: Bruising present. No erythema, lesion or rash.   Neurological:      Mental Status: He is alert and oriented to person, place, and time.      Gait: Gait abnormal.           Diagnoses and health risks identified today and associated recommendations/orders:    1. Encounter for preventive health examination  Discussed health maintenance guidelines appropriate for age.      2. Chronic hypoxemic respiratory failure        -    Stable. Continue meds. Follow up with pulmonology as she has not seen since 2022. Will continue to monitor.       3. Pulmonary fibrosis  - As above    4. Centrilobular emphysema  - As above    5. Pulmonary HTN    - sildenafil (REVATIO) 20 mg Tab; Take 1 tablet (20 mg total) by mouth 3 (three) times daily.  Dispense: 90 tablet; Refill: 2    6. Paroxysmal atrial fibrillation        -    Stable. Continue meds. Will continue to monitor.       7. Essential hypertension        -    Stable. Continue meds. Will continue to monitor.       8. Mixed hyperlipidemia        -    Stable. Continue meds. Will continue to monitor.       9. PAD (peripheral artery disease)        -    Stable. Continue meds. No alarming symptoms at this time. Will continue to monitor.       10. Bilateral hearing loss, unspecified hearing loss type    - Ambulatory referral/consult to ENT; Future  - Ambulatory referral/consult to Audiology; Future    11. Memory impairment    - TSH; Future  - Vitamin B1; Future  - Vitamin B12; Future  - Folate; Future  - MRI Brain Without Contrast; Future  - Treponema Pallidium Antibodies IgG, IgM; Future  - Ambulatory referral/consult to  Adult Neuropsychology; Future  - Ambulatory referral/consult to Neurology; Future    12. Other amnesia    - TSH; Future  - Vitamin B1; Future  - Vitamin B12; Future  - Folate; Future  - MRI Brain Without Contrast; Future  - Treponema Pallidium Antibodies IgG, IgM; Future  - Ambulatory referral/consult to Adult Neuropsychology; Future  - Ambulatory referral/consult to Neurology; Future    13. Dependence on supplemental oxygen  - Continue Home O2. Follow up with pulmonology.     14. Other reduced mobility  - Ambulates with rolling walker/cane     15. Senile purpura        -    Stable. No alarming symptoms at this time. On anticoag therapy. Will continue to monitor.         Provided Srinivasa with a 5-10 year written screening schedule and personal prevention plan. Recommendations were developed using the USPSTF age appropriate recommendations. Education, counseling, and referrals were provided as needed. After Visit Summary printed and given to patient which includes a list of additional screenings\tests needed.    No follow-ups on file.    Carlo Ceballos PA-C      I offered to discuss advanced care planning, including how to pick a person who would make decisions for you if you were unable to make them for yourself, called a health care power of , and what kind of decisions you might make such as use of life sustaining treatments such as ventilators and tube feeding when faced with a life limiting illness recorded on a living will that they will need to know. (How you want to be cared for as you near the end of your natural life)     X Patient is interested in learning more about how to make advanced directives.  I provided them paperwork and offered to discuss this with them.

## 2024-08-21 NOTE — TELEPHONE ENCOUNTER
1st attempt left message regarding smoking cessation quit 3 episode.  Voice message includes name and contact information.

## 2024-08-23 ENCOUNTER — TELEPHONE (OUTPATIENT)
Dept: FAMILY MEDICINE | Facility: CLINIC | Age: 69
End: 2024-08-23
Payer: MEDICARE

## 2024-08-23 ENCOUNTER — LAB VISIT (OUTPATIENT)
Dept: LAB | Facility: HOSPITAL | Age: 69
End: 2024-08-23
Payer: MEDICARE

## 2024-08-23 DIAGNOSIS — R41.3 OTHER AMNESIA: ICD-10-CM

## 2024-08-23 DIAGNOSIS — I27.20 PULMONARY HTN: ICD-10-CM

## 2024-08-23 DIAGNOSIS — R41.3 MEMORY IMPAIRMENT: ICD-10-CM

## 2024-08-23 LAB
FOLATE SERPL-MCNC: 3.9 NG/ML (ref 4–24)
TREPONEMA PALLIDUM IGG+IGM AB [PRESENCE] IN SERUM OR PLASMA BY IMMUNOASSAY: NONREACTIVE
TSH SERPL DL<=0.005 MIU/L-ACNC: 1.16 UIU/ML (ref 0.4–4)
VIT B12 SERPL-MCNC: 320 PG/ML (ref 210–950)

## 2024-08-23 PROCEDURE — 84443 ASSAY THYROID STIM HORMONE: CPT

## 2024-08-23 PROCEDURE — 82746 ASSAY OF FOLIC ACID SERUM: CPT

## 2024-08-23 PROCEDURE — 82607 VITAMIN B-12: CPT

## 2024-08-23 PROCEDURE — 86593 SYPHILIS TEST NON-TREP QUANT: CPT

## 2024-08-23 PROCEDURE — 36415 COLL VENOUS BLD VENIPUNCTURE: CPT | Mod: PO

## 2024-08-23 PROCEDURE — 84425 ASSAY OF VITAMIN B-1: CPT

## 2024-08-23 RX ORDER — SILDENAFIL CITRATE 20 MG/1
20 TABLET ORAL 3 TIMES DAILY
Qty: 90 TABLET | Refills: 2 | Status: SHIPPED | OUTPATIENT
Start: 2024-08-23 | End: 2024-08-23

## 2024-08-23 RX ORDER — SILDENAFIL CITRATE 20 MG/1
20 TABLET ORAL 3 TIMES DAILY
Qty: 90 TABLET | Refills: 2 | Status: SHIPPED | OUTPATIENT
Start: 2024-08-23 | End: 2025-08-23

## 2024-08-23 NOTE — TELEPHONE ENCOUNTER
Yes lets print the prescription and fax over to his preferred [facility]. Thank you! I changed the Rx to print

## 2024-08-23 NOTE — TELEPHONE ENCOUNTER
I want the prescription to go to [facility] pharmacy. It keeps automatically changing it to specialty pharmacy. Patient will use coupon from [employer]

## 2024-08-23 NOTE — TELEPHONE ENCOUNTER
----- Message from Andre Person PharmD sent at 8/22/2024  4:36 PM CDT -----  Regarding: Revatipop Wilcox afternoon,    [facility] received Mr. Oliver's order for Revatio. His insurance requires an updated diagnosis with the specific classification of pulmonary hypertension. Please advise.    Thank you,  Andre Person, Jameel  Clinical Pharmacist  [facility]  Phone: [phone]  Fax: [phone]   Pt would like to speak to a RN, in regards to, her medication is still under Dr Owens in her [facility]...     Pt is now seeing Dr Ogden...

## 2024-08-23 NOTE — TELEPHONE ENCOUNTER
Writer currently remote.  Will reach out to in office [facility] for assistance with faxing prescription.  Will also forward message to Andre Person PharmD for notification.

## 2024-08-30 LAB — VIT B1 BLD-MCNC: 68 UG/L (ref 38–122)

## 2024-09-09 ENCOUNTER — OFFICE VISIT (OUTPATIENT)
Dept: PULMONOLOGY | Facility: CLINIC | Age: 69
End: 2024-09-09
Payer: MEDICARE

## 2024-09-09 VITALS
BODY MASS INDEX: 23.95 KG/M2 | OXYGEN SATURATION: 84 % | HEIGHT: 71 IN | DIASTOLIC BLOOD PRESSURE: 78 MMHG | SYSTOLIC BLOOD PRESSURE: 132 MMHG | HEART RATE: 70 BPM | WEIGHT: 171.06 LBS

## 2024-09-09 DIAGNOSIS — I27.20 PULMONARY HTN: ICD-10-CM

## 2024-09-09 DIAGNOSIS — R91.1 LUNG NODULE: ICD-10-CM

## 2024-09-09 DIAGNOSIS — J84.10 PULMONARY FIBROSIS: Primary | ICD-10-CM

## 2024-09-09 DIAGNOSIS — I10 ESSENTIAL HYPERTENSION: ICD-10-CM

## 2024-09-09 DIAGNOSIS — Z87.891 PERSONAL HISTORY OF NICOTINE DEPENDENCE: ICD-10-CM

## 2024-09-09 PROBLEM — R94.2 ABNORMAL PET SCAN OF LUNG: Status: RESOLVED | Noted: 2022-07-12 | Resolved: 2024-09-09

## 2024-09-09 PROCEDURE — 1159F MED LIST DOCD IN RCRD: CPT | Mod: HCNC,CPTII,S$GLB, | Performed by: INTERNAL MEDICINE

## 2024-09-09 PROCEDURE — 99214 OFFICE O/P EST MOD 30 MIN: CPT | Mod: HCNC,S$GLB,, | Performed by: INTERNAL MEDICINE

## 2024-09-09 PROCEDURE — 1101F PT FALLS ASSESS-DOCD LE1/YR: CPT | Mod: HCNC,CPTII,S$GLB, | Performed by: INTERNAL MEDICINE

## 2024-09-09 PROCEDURE — 3288F FALL RISK ASSESSMENT DOCD: CPT | Mod: HCNC,CPTII,S$GLB, | Performed by: INTERNAL MEDICINE

## 2024-09-09 PROCEDURE — 99999 PR PBB SHADOW E&M-EST. PATIENT-LVL III: CPT | Mod: PBBFAC,,, | Performed by: INTERNAL MEDICINE

## 2024-09-09 PROCEDURE — 3078F DIAST BP <80 MM HG: CPT | Mod: HCNC,CPTII,S$GLB, | Performed by: INTERNAL MEDICINE

## 2024-09-09 PROCEDURE — 1126F AMNT PAIN NOTED NONE PRSNT: CPT | Mod: HCNC,CPTII,S$GLB, | Performed by: INTERNAL MEDICINE

## 2024-09-09 PROCEDURE — 3008F BODY MASS INDEX DOCD: CPT | Mod: HCNC,CPTII,S$GLB, | Performed by: INTERNAL MEDICINE

## 2024-09-09 PROCEDURE — 3075F SYST BP GE 130 - 139MM HG: CPT | Mod: HCNC,CPTII,S$GLB, | Performed by: INTERNAL MEDICINE

## 2024-09-09 RX ORDER — ALBUTEROL SULFATE 90 UG/1
2 INHALANT RESPIRATORY (INHALATION) EVERY 4 HOURS PRN
Qty: 18 G | Refills: 11 | Status: SHIPPED | OUTPATIENT
Start: 2024-09-09

## 2024-09-09 RX ORDER — BENZONATATE 200 MG/1
200 CAPSULE ORAL 3 TIMES DAILY PRN
Qty: 60 CAPSULE | Refills: 2 | Status: SHIPPED | OUTPATIENT
Start: 2024-09-09 | End: 2024-10-09

## 2024-09-09 NOTE — PROGRESS NOTES
9/9/2024    Srinivasa Oliver  Follow up    Chief Complaint   Patient presents with    Pulmonary Fibrosis         HPI:   09/09/2024- pt here with spouse who assists w/ history.   He was lost to follow up due to dislikes coming to the doctor. In particular when he went to the cancer center in [facility] in past for bronchoscopy he decided he wouldn't ever want to have any chemo treatment if a cancer was found in his lung.  He notices more coughing beto worse at night, positional.  He spits up mucous which helps him feel better. He takes inhaler before bed which seems to help. Nebs help too- does not use regularly. He wears O2 for a few hours prior to going to sleep, off and on in the daytime.  He does not have O2 with him today and sat is 84% on room air. He finds O2 tank too heavy to carry around while he does things around the house and out of the house. He denies dizziness, HA, passing out, chest pains.  He continues to smoke 1 ppd currently.    09/22/2022-   Airway clearance regimen to help with thick mucous, coughing:   Nebulizer with albuterol twice daily   Use flutter valve twice daily with treatments   Postural drainage at bedtime (handout given) to help drain lower lobes    Pirfenadone (Esbriet) ordered start 1 pill 3 times daily first week, then 2 pills 3 times daily second week, then 3 pills 3 times daily for full dose  Blood testing once monthly to watch liver tests for next 3 months  Quit smoking recommended- nicotine patch ordered  Smoking cessation program ordered      Addendum-   From Dr. Sandra date 9/28/22-   I presented him on lung cancer conference yesterday. Radiologist thought there was a slight decrease in LEIGHTON lesion. XRT doc said that due to ILD, risk of pulmonary toxicity would be higher for empiric radiation and rather not do it. There is a new RLL nodule about 6 mm that needs follow up. In summary we recommend 3  - 4 month follow up CT chest      Addendum-   Per pharmacist Esbriet was denied  so I will order Ofev for him.    pt has stable sob, cough w/ mucous. He and wife both think cough/mucous improved with antibiotic. He says phlegm is usually white with once/wk bloody streak.  Sometimes he notices phlegm comes up when lies in a certain position  Pt falls asleep easily, does not snore.  He still smokes 7-8 cigarettes daily    07/21/2022-   Lung scarring - pulmonary fibrosis- is very severe end stage-- suspect due to occupational dust exposure   Treat pneumonia first then will consider med called pirfenadone in future  Continue oxygen, need to wear any time you are being active to keep sats >90%  Take levaquin 7 days for suspected pneumonia in the left upper lobe  Get chest x-ray after completing antibiotics  Repeat CT scan in 2 mos to ensure pneumonia is improving  Repeat PFT 2 months   Treat pneumonia first then will consider antifibrotic med called pirfenadone (Esbriet) in future    Pt is a 65 yo male with CAD s/p CABG, atrial fib, pulmonary fibrosis, 2.8 cm PET avid lung nodule who is new to me and recently established care at our office. Case discussed w/ NP and imaging reviewed. He was sent for navigational bronch, ebus (7/12 per Dr. Sandra) with bx of LEIGHTON nodule and LN biopsies- path negative for malignancy  Fri pt had SOB and heart palpitations  Pt just found out about pulmonary fibrosis however findings present on imaging since 2008  He was just started on home O2, has POC  He smokes 1 ppd- since age 18. He was able to quit using smoking cessation classes in past but started again due to stress w/ wife's health. He has tried chantix twice w/ no success. Also vapes nicotine about 6 months now.  He gets short of breath mowing the lawn.  Coughs w white phlegm, phlegm from nose  Wife assists w/ history. Pt is a poor historian.  Pt was a , , worked in [employer], retired 2013 and had worked 26 yrs at [employer]. 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  Family hx- sister  of lung cancer. No family hx of pulm fibrosis.    2022-   Scheduling lung strength test, this must be done  Will wait on PET scan results. Expect to have tissue biopsy at hospital.   screening Ct by PCP showed diffuse emphysema and multiple nodules. Scheduled for PET scan.   Compliant of SOB onset years, worse with exertion, improves with rest. On symbicort and albuterol rescue with benefit.   Cough- onset 6 months, worse in mornings, productive clear mucous. Associated with wheeze. Has nocturnal coughing fits.     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, Smoking Hx; currently smoking 1 pack daily, 43 pack years  Family Hx: no Lung Cancer, no COPD, no Asthma  Medical Hx: no previous pneumonia ; no previous shoulder surgery, CABG surgery       The chief compliant  problem is stable  PFSH:  Past Medical History:   Diagnosis Date    Adenomatous colon polyp 2011    Adenomatous colon polyp     Anxiety     Atrial fibrillation     Blood transfusion     Centrilobular emphysema 10/16/2023    CHF (congestive heart failure)     Coronary artery disease     Emphysema of lung     Hypertension     Myocardial infarction     Renal disorder     kidney stones    Respiratory failure     S/P coronary artery stent placement 2013    Staph skin infection     abd/ lanced x 2    Ulcer          Past Surgical History:   Procedure Laterality Date    COLONOSCOPY  2011    Dr Choudhary, tubular adenoma    CORONARY ARTERY BYPASS GRAFT      CORONARY STENT PLACEMENT      ENDOBRONCHIAL ULTRASOUND N/A 2022    Procedure: ENDOBRONCHIAL ULTRASOUND (EBUS);  Surgeon: Alli Sandra MD;  Location: [facility];  Service: Pulmonary;  Laterality: N/A;    NAVIGATIONAL BRONCHOSCOPY Bilateral 2022    Procedure: BRONCHOSCOPY, NAVIGATIONAL;  Surgeon: Alli Sandra MD;  Location: [facility];  Service:  "Pulmonary;  Laterality: Bilateral;     Social History     Tobacco Use    Smoking status: Former     Current packs/day: 1.00     Average packs/day: 0.8 packs/day for 48.3 years (37.8 ttl pk-yrs)     Types: Cigarettes     Start date: 5/31/1976    Smokeless tobacco: Never    Tobacco comments:     Pt currently smoking 1ppd. Ready to quit, has a patch on while admitted for cravings. Has made multiple attempts to quit and has started again. Inpatient smoking cessation done 8/9/23. Pt is a [employer] Member: [id number]   Substance Use Topics    Alcohol use: No     Alcohol/week: 0.0 standard drinks of alcohol    Drug use: No     Family History   Problem Relation Name Age of Onset    Heart disease Mother      Heart disease Father      Diabetes Father      Heart disease Maternal Aunt      No Known Problems Sister      No Known Problems Brother      No Known Problems Daughter      No Known Problems Son       Review of patient's allergies indicates:   Allergen Reactions    Atorvastatin      Other reaction(s): Rash     I have reviewed past medical, family, and social history. I have reviewed previous nurse notes.    Performance Status:The patient's activity level is functions out of house.  He is very active, mows lawn    Review of Systems:  a review of eleven systems covering constitutional, Eye, HEENT, Psych, Respiratory, Cardiac, GI, , Musculoskeletal, Endocrine, Dermatologic was negative except for pertinent findings as listed ABOVE and below:  Diarrhea- chronic   Nose runs    Exam:Comprehensive exam done. /78 (BP Location: Right arm, Patient Position: Sitting, BP Method: Medium (Automatic))   Pulse 70   Ht 5' 11" (1.803 m)   Wt 77.6 kg (171 lb 1.2 oz)   SpO2 (!) 84% Comment: at rest on room air  BMI 23.86 kg/m²   Exam included Vitals as listed, and patient's appearance and affect and alertness and mood, oral exam for yeast and hygiene and pharynx lesions and Mallapatti (M) score, neck with inspection for jvd " and masses and thyroid abnormalities and lymph nodes (supraclavicular and infraclavicular nodes and axillary also examined and noted if abn), chest exam included symmetry and effort and fremitus and percussion and auscultation, cardiac exam included rhythm and gallops and murmur and rubs and jvd and edema, abdominal exam for mass and hepatosplenomegaly and tenderness and hernias and bowel sounds, Musculoskeletal exam with muscle tone and posture and mobility/gait and  strength, and skin for rashes and cyanosis and pallor and turgor, extremity for clubbing.  Findings were normal except for pertinent findings listed below:  M4, edentulous  HR regular  Breath sounds with scattered rales and crackles bilaterally L>R  Significant clubbing bilat nails  No edema    Radiographs (ct chest and cxr) reviewed: view by direct vision  CXR 8/2023- worsened bilat pulmonary fibrosis with possible superimposed acute process on left    CT chest 9/21/22- LEIGHTON consolidation stable past 3 scans and appears somewhat improved compared to 6/2022 scan. Fibrosis overall stable  CT chest 8/22/22- stable    PET CT 7/7/22-  LEIGHTON PET avid nodular consolidation appears similar size to previous, also with PET avid mediastinal adenopathy  1. Multiple contiguous hypermetabolic pleural based pulmonary opacities in the left upper lobe of the lung, nonspecific. Given appearance and morphology, these could be of infectious or inflammatory etiology. Consider short-term chest CT follow-up in 3 months following antibiotic therapy. Alternatively, these could be biopsied for tissue diagnosis however this patient is a high risk for pneumothorax with CT-guided lung biopsy, given underlying parenchymal lung disease and emphysema.  2. No additional findings of FDG avid primary neoplasm or metastatic disease.  3. Several prominent to mildly enlarged mediastinal lymph nodes which are very mildly hypermetabolic, and stable compared to multiple prior CT exams, most  likely incidental and or reactive.  4. Additional observations as described.    CT Chest Lung Screening Low Dose  06/20/2022 - there is very severe pulmonary fibrosis features include peripheral predominant, even distribution in the upper and lower lobes w/ honeycombing, bronchiectasis and reticulations.  LEIGHTON pleural based nodular consolidation without distrete borders. Fibrosis is ?slightly progressed compared to prior CT 4/2021 but definitely progressive since 2020    CT chest 2009- lower lobe ground glass, milder ILD w/ reticulations and peripheral honeycombing (beto upper lobes)     TTE 8/9/23-     Left Ventricle: The left ventricle is normal in size. Normal wall thickness. Normal wall motion. There is normal systolic function. Ejection fraction by visual approximation is 60%. There is normal diastolic function.    Left Atrium: Left atrium is moderately dilated. Left atrium elongated due to cardiac transplantation.    Right Ventricle: Moderate right ventricular enlargement. Wall thickness is normal. Right ventricle wall motion  is normal. Systolic function is normal.    Right Atrium: Right atrium is moderately dilated.    Mitral Valve: Mild mitral annular calcification.    Tricuspid Valve: There is moderate regurgitation. There is moderate pulmonary hypertension.    IVC/SVC: Normal venous pressure at 3 mmHg.    Labs reviewed         Lab Results   Component Value Date    WBC 7.09 10/17/2023    HGB 15.7 10/17/2023    HCT 48.6 10/17/2023     (H) 10/17/2023     10/17/2023       CMP  Sodium   Date Value Ref Range Status   10/17/2023 140 136 - 145 mmol/L Final     Potassium   Date Value Ref Range Status   10/17/2023 4.2 3.5 - 5.1 mmol/L Final     Chloride   Date Value Ref Range Status   10/17/2023 107 95 - 110 mmol/L Final     CO2   Date Value Ref Range Status   10/17/2023 24 23 - 29 mmol/L Final     Glucose   Date Value Ref Range Status   10/17/2023 108 70 - 110 mg/dL Final     BUN   Date Value Ref Range  Status   10/17/2023 10 8 - 23 mg/dL Final     Creatinine   Date Value Ref Range Status   10/17/2023 0.8 0.5 - 1.4 mg/dL Final   01/28/2013 0.7 0.5 - 1.4 mg/dL Final     Calcium   Date Value Ref Range Status   10/17/2023 9.8 8.7 - 10.5 mg/dL Final   01/28/2013 9.4 8.7 - 10.5 mg/dL Final     Total Protein   Date Value Ref Range Status   10/17/2023 8.1 6.0 - 8.4 g/dL Final     Albumin   Date Value Ref Range Status   10/17/2023 3.4 (L) 3.5 - 5.2 g/dL Final     Total Bilirubin   Date Value Ref Range Status   10/17/2023 0.6 0.1 - 1.0 mg/dL Final     Comment:     For infants and newborns, interpretation of results should be based  on gestational age, weight and in agreement with clinical  observations.    Premature Infant recommended reference ranges:  Up to 24 hours.............<8.0 mg/dL  Up to 48 hours............<12.0 mg/dL  3-5 days..................<15.0 mg/dL  6-29 days.................<15.0 mg/dL       Alkaline Phosphatase   Date Value Ref Range Status   10/17/2023 117 55 - 135 U/L Final   06/26/2013 112 55 - 135 U/L Final     AST   Date Value Ref Range Status   10/17/2023 17 10 - 40 U/L Final   06/26/2013 11 10 - 40 U/L Final     ALT   Date Value Ref Range Status   10/17/2023 7 (L) 10 - 44 U/L Final     Anion Gap   Date Value Ref Range Status   10/17/2023 9 8 - 16 mmol/L Final   01/28/2013 10 5 - 15 meq/L Final     eGFR   Date Value Ref Range Status   10/17/2023 >60.0 >60 mL/min/1.73 m^2 Final         PFT reviewed-   9/21/22- slightly improved restriction, stable dlco    6/24/22- restriction, severe reduction in dlco        6mwt 6/24/22- 255m, O2 reqt 3-4L    Plan:  Clinical impression is apparently straight forward and impression with management as below. Very advanced pulm fibrosis, suspect related to occupational dust exposure. O2 dependent- needs to use his O2 more  Bronchiectasis, congested cough seems to be his most troublesome symptom.  He is surprisingly active and independent still  Lung nodule needs  "f/u  Continues to smoke  Given his past hesitance at regular follow up will focus more on symptom relief- discussed with him and he wishes to hold off on antifibrotic tx at this time due to fear of side effects.    Srinivasa Polanco" was seen today for pulmonary fibrosis.    Diagnoses and all orders for this visit:    Pulmonary fibrosis  -     CT Chest Without Contrast; Future  -     albuterol (PROVENTIL HFA) 90 mcg/actuation inhaler; Inhale 2 puffs into the lungs every 4 (four) hours as needed for Wheezing. Every 4-6 hours PRN  -     Six Minute Walk Test to qualify for Home Oxygen; Future  -     benzonatate (TESSALON) 200 MG capsule; Take 1 capsule (200 mg total) by mouth 3 (three) times daily as needed for Cough.    Lung nodule    Personal history of nicotine dependence     Essential hypertension    Pulmonary HTN            Follow up in about 3 months (around 12/9/2024).    Discussed with patient above for education the following:      Patient Instructions   Use nebulizer treatment twice daily with flutter valve to help clear mucous  CT chest to reevaluate lung nodule and pulmonary fibrosis  Need to use oxygen to keep sats >88%    Can further discuss potential pulmonary fibrosis treatment in the future- Ofev- but comes with potential of making diarrhea worse so this may not be a great option for you.        "

## 2024-09-09 NOTE — PATIENT INSTRUCTIONS
Use nebulizer treatment twice daily with flutter valve to help clear mucous  CT chest to reevaluate lung nodule and pulmonary fibrosis  Need to use oxygen to keep sats >88%    Can further discuss potential pulmonary fibrosis treatment in the future- Ofev- but comes with potential of making diarrhea worse so this may not be a great option for you.

## 2024-09-16 ENCOUNTER — TELEPHONE (OUTPATIENT)
Dept: OTOLARYNGOLOGY | Facility: CLINIC | Age: 69
End: 2024-09-16
Payer: MEDICARE

## 2024-09-16 NOTE — TELEPHONE ENCOUNTER
Called for pt, spoke to wife. Apologized to and advised that Dr. Rutledge will not be in clinic 9/25/24, we need to reschedule appt. Appt rescheduled to 10/2/24 at 11 am for audiogram and 1140 with Dr. Rutledge. Thanks, Roxane

## 2024-09-17 ENCOUNTER — HOSPITAL ENCOUNTER (OUTPATIENT)
Dept: PULMONOLOGY | Facility: HOSPITAL | Age: 69
Discharge: HOME | End: 2024-09-17
Attending: INTERNAL MEDICINE
Payer: MEDICARE

## 2024-09-17 ENCOUNTER — HOSPITAL ENCOUNTER (OUTPATIENT)
Dept: RADIOLOGY | Facility: HOSPITAL | Age: 69
Discharge: HOME | End: 2024-09-17
Attending: INTERNAL MEDICINE
Payer: MEDICARE

## 2024-09-17 DIAGNOSIS — J84.10 PULMONARY FIBROSIS: ICD-10-CM

## 2024-09-17 DIAGNOSIS — J84.10 PULMONARY FIBROSIS: Primary | ICD-10-CM

## 2024-09-17 PROCEDURE — 94618 PULMONARY STRESS TESTING: CPT

## 2024-09-17 PROCEDURE — 71250 CT THORAX DX C-: CPT | Mod: 26,,, | Performed by: RADIOLOGY

## 2024-09-17 PROCEDURE — 94618 PULMONARY STRESS TESTING: CPT | Mod: 26,,, | Performed by: INTERNAL MEDICINE

## 2024-09-17 PROCEDURE — 71250 CT THORAX DX C-: CPT | Mod: TC

## 2024-09-23 ENCOUNTER — OFFICE VISIT (OUTPATIENT)
Dept: PULMONOLOGY | Facility: CLINIC | Age: 69
End: 2024-09-23
Payer: MEDICARE

## 2024-09-23 VITALS
DIASTOLIC BLOOD PRESSURE: 58 MMHG | BODY MASS INDEX: 23.74 KG/M2 | OXYGEN SATURATION: 83 % | HEART RATE: 73 BPM | SYSTOLIC BLOOD PRESSURE: 104 MMHG | HEIGHT: 71 IN | WEIGHT: 169.56 LBS

## 2024-09-23 DIAGNOSIS — J84.10 PULMONARY FIBROSIS: ICD-10-CM

## 2024-09-23 DIAGNOSIS — J96.11 CHRONIC HYPOXEMIC RESPIRATORY FAILURE: ICD-10-CM

## 2024-09-23 DIAGNOSIS — R91.8 LUNG MASS: Primary | ICD-10-CM

## 2024-09-23 DIAGNOSIS — J43.2 CENTRILOBULAR EMPHYSEMA: ICD-10-CM

## 2024-09-23 DIAGNOSIS — F17.200 TOBACCO DEPENDENCE: ICD-10-CM

## 2024-09-23 PROCEDURE — 3288F FALL RISK ASSESSMENT DOCD: CPT | Mod: HCNC,CPTII,S$GLB, | Performed by: INTERNAL MEDICINE

## 2024-09-23 PROCEDURE — 1101F PT FALLS ASSESS-DOCD LE1/YR: CPT | Mod: HCNC,CPTII,S$GLB, | Performed by: INTERNAL MEDICINE

## 2024-09-23 PROCEDURE — 1159F MED LIST DOCD IN RCRD: CPT | Mod: HCNC,CPTII,S$GLB, | Performed by: INTERNAL MEDICINE

## 2024-09-23 PROCEDURE — 99999 PR PBB SHADOW E&M-EST. PATIENT-LVL IV: CPT | Mod: PBBFAC,HCNC,, | Performed by: INTERNAL MEDICINE

## 2024-09-23 PROCEDURE — 99214 OFFICE O/P EST MOD 30 MIN: CPT | Mod: HCNC,S$GLB,, | Performed by: INTERNAL MEDICINE

## 2024-09-23 PROCEDURE — 3074F SYST BP LT 130 MM HG: CPT | Mod: HCNC,CPTII,S$GLB, | Performed by: INTERNAL MEDICINE

## 2024-09-23 PROCEDURE — 3078F DIAST BP <80 MM HG: CPT | Mod: HCNC,CPTII,S$GLB, | Performed by: INTERNAL MEDICINE

## 2024-09-23 PROCEDURE — 3008F BODY MASS INDEX DOCD: CPT | Mod: HCNC,CPTII,S$GLB, | Performed by: INTERNAL MEDICINE

## 2024-09-23 NOTE — PATIENT INSTRUCTIONS
Oxygen needed at all times to maintain O2 sats >88%- going without oxygen can put stress on the body and can be life threatening    Please start using 4L oxygen at all times and increase to 6L with activity in order to maintain sats >88%.     You have a large mass on the left lung which is suspected to be a lung cancer  Biopsy of the mass is recommended to get a diagnosis  Oncologist referral placed to see about treatment options  Financial concerns- referral to  to get assistance with your needs  Please call or message office with further questions or concerns

## 2024-09-23 NOTE — PROGRESS NOTES
9/23/2024    Srniivasa Oliver  Follow up    Chief Complaint   Patient presents with    Pulmonary Fibrosis         HPI:   09/23/2024- pt here to discuss CT- with large mass, progressive pulm fibrosis. Also found to have worsened O2 reqt. He is here today alone, not wearing O2. Sat 83% on RA today. Discussed with him he has a large mass of the left lung suspected to be a cancer  09/09/2024-   Use nebulizer treatment twice daily with flutter valve to help clear mucous  CT chest to reevaluate lung nodule and pulmonary fibrosis  Need to use oxygen to keep sats >88%    Can further discuss potential pulmonary fibrosis treatment in the future- Ofev- but comes with potential of making diarrhea worse so this may not be a great option for you.    pt here with spouse who assists w/ history.   He was lost to follow up due to dislikes coming to the doctor. In particular when he went to the cancer center in [facility] in past for bronchoscopy he decided he wouldn't ever want to have any chemo treatment if a cancer was found in his lung.  He notices more coughing beto worse at night, positional.  He spits up mucous which helps him feel better. He takes inhaler before bed which seems to help. Nebs help too- does not use regularly. He wears O2 for a few hours prior to going to sleep, off and on in the daytime.  He does not have O2 with him today and sat is 84% on room air. He finds O2 tank too heavy to carry around while he does things around the house and out of the house. He denies dizziness, HA, passing out, chest pains.  He continues to smoke 1 ppd currently.    09/22/2022-   Airway clearance regimen to help with thick mucous, coughing:   Nebulizer with albuterol twice daily   Use flutter valve twice daily with treatments   Postural drainage at bedtime (handout given) to help drain lower lobes    Pirfenadone (Esbriet) ordered start 1 pill 3 times daily first week, then 2 pills 3 times daily second week, then 3 pills 3 times daily  for full dose  Blood testing once monthly to watch liver tests for next 3 months  Quit smoking recommended- nicotine patch ordered  Smoking cessation program ordered      Addendum-   From Dr. Sandra date 9/28/22-   I presented him on lung cancer conference yesterday. Radiologist thought there was a slight decrease in LEIGHTON lesion. XRT doc said that due to ILD, risk of pulmonary toxicity would be higher for empiric radiation and rather not do it. There is a new RLL nodule about 6 mm that needs follow up. In summary we recommend 3  - 4 month follow up CT chest      Addendum-   Per pharmacist Esbriet was denied so I will order Ofev for him.    pt has stable sob, cough w/ mucous. He and wife both think cough/mucous improved with antibiotic. He says phlegm is usually white with once/wk bloody streak.  Sometimes he notices phlegm comes up when lies in a certain position  Pt falls asleep easily, does not snore.  He still smokes 7-8 cigarettes daily    07/21/2022-   Lung scarring - pulmonary fibrosis- is very severe end stage-- suspect due to occupational dust exposure   Treat pneumonia first then will consider med called pirfenadone in future  Continue oxygen, need to wear any time you are being active to keep sats >90%  Take levaquin 7 days for suspected pneumonia in the left upper lobe  Get chest x-ray after completing antibiotics  Repeat CT scan in 2 mos to ensure pneumonia is improving  Repeat PFT 2 months   Treat pneumonia first then will consider antifibrotic med called pirfenadone (Esbriet) in future    Pt is a 65 yo male with CAD s/p CABG, atrial fib, pulmonary fibrosis, 2.8 cm PET avid lung nodule who is new to me and recently established care at our office. Case discussed w/ NP and imaging reviewed. He was sent for navigational bronch, ebus (7/12 per Dr. Sandra) with bx of LEIGHTON nodule and LN biopsies- path negative for malignancy  Fri pt had SOB and heart palpitations  Pt just found out about pulmonary fibrosis  however findings present on imaging since   He was just started on home O2, has POC  He smokes 1 ppd- since age 18. He was able to quit using smoking cessation classes in past but started again due to stress w/ wife's health. He has tried chantix twice w/ no success. Also vapes nicotine about 6 months now.  He gets short of breath mowing the lawn.  Coughs w white phlegm, phlegm from nose  Wife assists w/ history. Pt is a poor historian.  Pt was a , , worked in [employer], retired  and had worked 26 yrs at [employer]. 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  Family hx- sister  of lung cancer. No family hx of pulm fibrosis.    2022-   Scheduling lung strength test, this must be done  Will wait on PET scan results. Expect to have tissue biopsy at hospital.   screening Ct by PCP showed diffuse emphysema and multiple nodules. Scheduled for PET scan.   Compliant of SOB onset years, worse with exertion, improves with rest. On symbicort and albuterol rescue with benefit.   Cough- onset 6 months, worse in mornings, productive clear mucous. Associated with wheeze. Has nocturnal coughing fits.     Social Hx: lives with wife and pet dog, retired from [employer], possible Asbestosis exposure, Smoking Hx; currently smoking 1 pack daily, 43 pack years  Family Hx: no Lung Cancer, no COPD, no Asthma  Medical Hx: no previous pneumonia ; no previous shoulder surgery, CABG surgery       The chief compliant  problem is stable  PFSH:  Past Medical History:   Diagnosis Date    Adenomatous colon polyp 2011    Adenomatous colon polyp     Anxiety     Atrial fibrillation     Blood transfusion     Centrilobular emphysema 10/16/2023    CHF (congestive heart failure)     Coronary artery disease     Emphysema of lung     Hypertension     Myocardial infarction     Renal disorder     kidney stones    Respiratory failure     S/P  coronary artery stent placement 01/07/2013    Staph skin infection     abd/ lanced x 2    Ulcer          Past Surgical History:   Procedure Laterality Date    COLONOSCOPY  01/28/2011    Dr Choudhary, tubular adenoma    CORONARY ARTERY BYPASS GRAFT  2005    CORONARY STENT PLACEMENT      ENDOBRONCHIAL ULTRASOUND N/A 7/18/2022    Procedure: ENDOBRONCHIAL ULTRASOUND (EBUS);  Surgeon: Alli Sandra MD;  Location: [facility];  Service: Pulmonary;  Laterality: N/A;    NAVIGATIONAL BRONCHOSCOPY Bilateral 7/18/2022    Procedure: BRONCHOSCOPY, NAVIGATIONAL;  Surgeon: Alli Sandra MD;  Location: [facility];  Service: Pulmonary;  Laterality: Bilateral;     Social History     Tobacco Use    Smoking status: Former     Current packs/day: 1.00     Average packs/day: 0.8 packs/day for 48.3 years (37.8 ttl pk-yrs)     Types: Cigarettes     Start date: 5/31/1976    Smokeless tobacco: Never    Tobacco comments:     Pt currently smoking 1ppd. Ready to quit, has a patch on while admitted for cravings. Has made multiple attempts to quit and has started again. Inpatient smoking cessation done 8/9/23. Pt is a [employer] Member: [id number]   Substance Use Topics    Alcohol use: No     Alcohol/week: 0.0 standard drinks of alcohol    Drug use: No     Family History   Problem Relation Name Age of Onset    Heart disease Mother      Heart disease Father      Diabetes Father      Heart disease Maternal Aunt      No Known Problems Sister      No Known Problems Brother      No Known Problems Daughter      No Known Problems Son       Review of patient's allergies indicates:   Allergen Reactions    Atorvastatin      Other reaction(s): Rash     I have reviewed past medical, family, and social history. I have reviewed previous nurse notes.    Performance Status:The patient's activity level is functions out of house.  He is very active, mows lawn    Review of Systems:  a review of eleven systems covering constitutional, Eye, HEENT, Psych, Respiratory,  "Cardiac, GI, , Musculoskeletal, Endocrine, Dermatologic was negative except for pertinent findings as listed ABOVE and below:  Diarrhea- chronic   Nose runs    Exam:Comprehensive exam done. BP (!) 104/58 (BP Location: Left arm, Patient Position: Sitting, BP Method: Medium (Automatic))   Pulse 73   Ht 5' 11" (1.803 m)   Wt 76.9 kg (169 lb 8.5 oz)   SpO2 (!) 83%   BMI 23.65 kg/m²   Exam included Vitals as listed, and patient's appearance and affect and alertness and mood, oral exam for yeast and hygiene and pharynx lesions and Mallapatti (M) score, neck with inspection for jvd and masses and thyroid abnormalities and lymph nodes (supraclavicular and infraclavicular nodes and axillary also examined and noted if abn), chest exam included symmetry and effort and fremitus and percussion and auscultation, cardiac exam included rhythm and gallops and murmur and rubs and jvd and edema, abdominal exam for mass and hepatosplenomegaly and tenderness and hernias and bowel sounds, Musculoskeletal exam with muscle tone and posture and mobility/gait and  strength, and skin for rashes and cyanosis and pallor and turgor, extremity for clubbing.  Findings were normal except for pertinent findings listed below:  M4, edentulous  HR regular  Breath sounds with scattered rales and crackles bilaterally L>R  Significant clubbing bilat nails  No edema    Radiographs (ct chest and cxr) reviewed: view by direct vision  CXR 8/2023- worsened bilat pulmonary fibrosis with possible superimposed acute process on left    CT chest 9/21/22- LEIGHTON consolidation stable past 3 scans and appears somewhat improved compared to 6/2022 scan. Fibrosis overall stable  CT chest 8/22/22- stable    PET CT 7/7/22-  LEIGHTON PET avid nodular consolidation appears similar size to previous, also with PET avid mediastinal adenopathy  1. Multiple contiguous hypermetabolic pleural based pulmonary opacities in the left upper lobe of the lung, nonspecific. Given " appearance and morphology, these could be of infectious or inflammatory etiology. Consider short-term chest CT follow-up in 3 months following antibiotic therapy. Alternatively, these could be biopsied for tissue diagnosis however this patient is a high risk for pneumothorax with CT-guided lung biopsy, given underlying parenchymal lung disease and emphysema.  2. No additional findings of FDG avid primary neoplasm or metastatic disease.  3. Several prominent to mildly enlarged mediastinal lymph nodes which are very mildly hypermetabolic, and stable compared to multiple prior CT exams, most likely incidental and or reactive.  4. Additional observations as described.    CT Chest Lung Screening Low Dose  06/20/2022 - there is very severe pulmonary fibrosis features include peripheral predominant, even distribution in the upper and lower lobes w/ honeycombing, bronchiectasis and reticulations.  LEIGHTON pleural based nodular consolidation without distrete borders. Fibrosis is ?slightly progressed compared to prior CT 4/2021 but definitely progressive since 2020    CT chest 2009- lower lobe ground glass, milder ILD w/ reticulations and peripheral honeycombing (beto upper lobes)     TTE 8/9/23-     Left Ventricle: The left ventricle is normal in size. Normal wall thickness. Normal wall motion. There is normal systolic function. Ejection fraction by visual approximation is 60%. There is normal diastolic function.    Left Atrium: Left atrium is moderately dilated. Left atrium elongated due to cardiac transplantation.    Right Ventricle: Moderate right ventricular enlargement. Wall thickness is normal. Right ventricle wall motion  is normal. Systolic function is normal.    Right Atrium: Right atrium is moderately dilated.    Mitral Valve: Mild mitral annular calcification.    Tricuspid Valve: There is moderate regurgitation. There is moderate pulmonary hypertension.    IVC/SVC: Normal venous pressure at 3 mmHg.    Labs reviewed          Lab Results   Component Value Date    WBC 7.09 10/17/2023    HGB 15.7 10/17/2023    HCT 48.6 10/17/2023     (H) 10/17/2023     10/17/2023       CMP  Sodium   Date Value Ref Range Status   10/17/2023 140 136 - 145 mmol/L Final     Potassium   Date Value Ref Range Status   10/17/2023 4.2 3.5 - 5.1 mmol/L Final     Chloride   Date Value Ref Range Status   10/17/2023 107 95 - 110 mmol/L Final     CO2   Date Value Ref Range Status   10/17/2023 24 23 - 29 mmol/L Final     Glucose   Date Value Ref Range Status   10/17/2023 108 70 - 110 mg/dL Final     BUN   Date Value Ref Range Status   10/17/2023 10 8 - 23 mg/dL Final     Creatinine   Date Value Ref Range Status   10/17/2023 0.8 0.5 - 1.4 mg/dL Final   01/28/2013 0.7 0.5 - 1.4 mg/dL Final     Calcium   Date Value Ref Range Status   10/17/2023 9.8 8.7 - 10.5 mg/dL Final   01/28/2013 9.4 8.7 - 10.5 mg/dL Final     Total Protein   Date Value Ref Range Status   10/17/2023 8.1 6.0 - 8.4 g/dL Final     Albumin   Date Value Ref Range Status   10/17/2023 3.4 (L) 3.5 - 5.2 g/dL Final     Total Bilirubin   Date Value Ref Range Status   10/17/2023 0.6 0.1 - 1.0 mg/dL Final     Comment:     For infants and newborns, interpretation of results should be based  on gestational age, weight and in agreement with clinical  observations.    Premature Infant recommended reference ranges:  Up to 24 hours.............<8.0 mg/dL  Up to 48 hours............<12.0 mg/dL  3-5 days..................<15.0 mg/dL  6-29 days.................<15.0 mg/dL       Alkaline Phosphatase   Date Value Ref Range Status   10/17/2023 117 55 - 135 U/L Final   06/26/2013 112 55 - 135 U/L Final     AST   Date Value Ref Range Status   10/17/2023 17 10 - 40 U/L Final   06/26/2013 11 10 - 40 U/L Final     ALT   Date Value Ref Range Status   10/17/2023 7 (L) 10 - 44 U/L Final     Anion Gap   Date Value Ref Range Status   10/17/2023 9 8 - 16 mmol/L Final   01/28/2013 10 5 - 15 meq/L Final     eGFR   Date  "Value Ref Range Status   10/17/2023 >60.0 >60 mL/min/1.73 m^2 Final         PFT reviewed-   9/21/22- slightly improved restriction, stable dlco    6/24/22- restriction, severe reduction in dlco        6mwt 6/24/22- 255m, O2 reqt 3-4L    Plan:  Clinical impression is apparently straight forward and impression with management as below. Very advanced pulm fibrosis, suspect related to occupational dust exposure. O2 dependent- needs to use his O2 more  Lung mass suspect malignancy- discussed with him and agrees to undergo biopsy and explore treatment options  Continues to smoke      Srinivasa Polanco" was seen today for pulmonary fibrosis.    Diagnoses and all orders for this visit:    Lung mass  -     CT Biopsy Lung w/ guidance; Future  -     Ambulatory referral/consult to Outpatient Case Management  -     Ambulatory referral/consult to Hematology / Oncology; Future    Pulmonary fibrosis    Centrilobular emphysema    Tobacco dependence    Chronic hypoxemic respiratory failure              Follow up in about 6 weeks (around 11/4/2024).    Discussed with patient above for education the following:      Patient Instructions   Oxygen needed at all times to maintain O2 sats >88%- going without oxygen can put stress on the body and can be life threatening    Please start using 4L oxygen at all times and increase to 6L with activity in order to maintain sats >88%.     You have a large mass on the left lung which is suspected to be a lung cancer  Biopsy of the mass is recommended to get a diagnosis  Oncologist referral placed to see about treatment options  Financial concerns- referral to  to get assistance with your needs  Please call or message office with further questions or concerns          "

## 2024-09-24 ENCOUNTER — TELEPHONE (OUTPATIENT)
Dept: NEUROLOGY | Facility: CLINIC | Age: 69
End: 2024-09-24
Payer: MEDICARE

## 2024-09-24 NOTE — TELEPHONE ENCOUNTER
Called patient to schedule appointment for memory from referral received. Spoke with patients wife, she states patient has a lot of appointments and is having a lung biopsy. Will call back when ready to schedule.

## 2024-09-25 ENCOUNTER — TELEPHONE (OUTPATIENT)
Facility: CLINIC | Age: 69
End: 2024-09-25
Payer: MEDICARE

## 2024-09-25 NOTE — NURSING
Oncology Navigation   Intake  Cancer Type: LDCT/Incidental Lung Finding  Type of Referral: External  Date of Referral: 09/23/24  Initial Nurse Navigator Contact: 09/25/24  Referral to Initial Contact Timeline (days): 2  Appointment Date: 10/03/24     Treatment                              Acuity      Follow Up  No follow-ups on file.     Received referral on 9/12/24 from Dr Noreen Flowers for Lung Mass. Spoke with patient's wife who verbalized understanding of date, time and location of appointment.

## 2024-09-26 ENCOUNTER — TELEPHONE (OUTPATIENT)
Dept: INTERVENTIONAL RADIOLOGY/VASCULAR | Facility: HOSPITAL | Age: 69
End: 2024-09-26

## 2024-09-26 NOTE — NURSING
Request received for CT Lung Biopsy. Per Dr. Mckoy, patient should receive PET-CT first, before they are willing to approve a biopsy.     Message sent to Dr. Flowers and staff.

## 2024-09-27 DIAGNOSIS — R91.8 LUNG MASS: Primary | ICD-10-CM

## 2024-10-01 ENCOUNTER — TELEPHONE (OUTPATIENT)
Dept: PULMONOLOGY | Facility: CLINIC | Age: 69
End: 2024-10-01
Payer: MEDICARE

## 2024-10-01 ENCOUNTER — OUTPATIENT CASE MANAGEMENT (OUTPATIENT)
Dept: ADMINISTRATIVE | Facility: OTHER | Age: 69
End: 2024-10-01
Payer: MEDICARE

## 2024-10-01 NOTE — PROGRESS NOTES
Outpatient Care Management   - Patient Assessment    Patient: Srinivasa Oliver  MRN:  [id number]  Date of Service:  10/1/2024  Completed by:  Melani Womack LMSW  Referral Date: 09/23/2024    Reason for Visit   Patient presents with    Social Work Assessment    Plan Of Care       Brief Summary:  received a referral from outpatient provider for the following Low/Mod SW psychosocial needs food, copays .  The patient also requests assistance with Legal, and utilities . Care plan was created in collaboration with patient/caregiver input.  SW completed assessment with patient spouse. Spouse reports she and patient resides together. Spouse reports patient is independent with IADL's and ADL:s. Spouse  denied patient uses any assisted devices to ambulate. Spouse denied patient needs assistance with shelter and medicaiton but needs assistance with food, medical and utilities. Spouse reports patient drives himself to and from appointments.

## 2024-10-01 NOTE — TELEPHONE ENCOUNTER
Spoke to wife an explained to her everything. [facility] will reach back out to them after the 3rd for payment & get delivered     ----- Message from Melani Womack sent at 10/1/2024 11:05 AM CDT -----  Greetings, Dr. Flowers and or Staff    SW received a referral on the above to assist with social needs. While speaking with patient spouse. Spouse reports [facility] is contacting her regarding orders that were placed by your office for  oxygen. Spouse unsure if patient needs this particular equipment as he has tanks currently. Please contact patient/ spouse and offer assistance with concern.         Thank you,    Melani Womack [id number]

## 2024-10-02 ENCOUNTER — OFFICE VISIT (OUTPATIENT)
Dept: OTOLARYNGOLOGY | Facility: CLINIC | Age: 69
End: 2024-10-02
Payer: MEDICARE

## 2024-10-02 ENCOUNTER — CLINICAL SUPPORT (OUTPATIENT)
Dept: AUDIOLOGY | Facility: CLINIC | Age: 69
End: 2024-10-02
Payer: MEDICARE

## 2024-10-02 VITALS
SYSTOLIC BLOOD PRESSURE: 115 MMHG | BODY MASS INDEX: 24.72 KG/M2 | HEIGHT: 69 IN | WEIGHT: 166.88 LBS | DIASTOLIC BLOOD PRESSURE: 65 MMHG | HEART RATE: 62 BPM

## 2024-10-02 DIAGNOSIS — H83.3X3 NOISE-INDUCED HEARING LOSS OF BOTH EARS: Primary | ICD-10-CM

## 2024-10-02 DIAGNOSIS — H91.13 PRESBYCUSIS OF BOTH EARS: ICD-10-CM

## 2024-10-02 DIAGNOSIS — H90.3 SENSORINEURAL HEARING LOSS (SNHL) OF BOTH EARS: Primary | ICD-10-CM

## 2024-10-02 DIAGNOSIS — H93.13 TINNITUS, BILATERAL: ICD-10-CM

## 2024-10-02 PROCEDURE — 3078F DIAST BP <80 MM HG: CPT | Mod: HCNC,CPTII,S$GLB, | Performed by: OTOLARYNGOLOGY

## 2024-10-02 PROCEDURE — 3008F BODY MASS INDEX DOCD: CPT | Mod: HCNC,CPTII,S$GLB, | Performed by: OTOLARYNGOLOGY

## 2024-10-02 PROCEDURE — 3074F SYST BP LT 130 MM HG: CPT | Mod: HCNC,CPTII,S$GLB, | Performed by: OTOLARYNGOLOGY

## 2024-10-02 PROCEDURE — 99999 PR PBB SHADOW E&M-EST. PATIENT-LVL V: CPT | Mod: PBBFAC,,, | Performed by: OTOLARYNGOLOGY

## 2024-10-02 PROCEDURE — 1126F AMNT PAIN NOTED NONE PRSNT: CPT | Mod: HCNC,CPTII,S$GLB, | Performed by: OTOLARYNGOLOGY

## 2024-10-02 PROCEDURE — 99999 PR PBB SHADOW E&M-EST. PATIENT-LVL I: CPT | Mod: PBBFAC,,, | Performed by: AUDIOLOGIST

## 2024-10-02 PROCEDURE — 1159F MED LIST DOCD IN RCRD: CPT | Mod: HCNC,CPTII,S$GLB, | Performed by: OTOLARYNGOLOGY

## 2024-10-02 PROCEDURE — 3288F FALL RISK ASSESSMENT DOCD: CPT | Mod: HCNC,CPTII,S$GLB, | Performed by: OTOLARYNGOLOGY

## 2024-10-02 PROCEDURE — 1160F RVW MEDS BY RX/DR IN RCRD: CPT | Mod: HCNC,CPTII,S$GLB, | Performed by: OTOLARYNGOLOGY

## 2024-10-02 PROCEDURE — 1101F PT FALLS ASSESS-DOCD LE1/YR: CPT | Mod: HCNC,CPTII,S$GLB, | Performed by: OTOLARYNGOLOGY

## 2024-10-02 PROCEDURE — 99202 OFFICE O/P NEW SF 15 MIN: CPT | Mod: HCNC,S$GLB,, | Performed by: OTOLARYNGOLOGY

## 2024-10-02 NOTE — PROGRESS NOTES
[facility]    Subjective:      Patient: Srinivasa Oliver Patient PCP: Genaro Womack MD         :  1955     Sex:  male      MRN:  [id number]          Date of Visit: 10/02/2024      Chief Complaint: Hearing Loss (Hearing loss for years. Audiogram done today. Pt has worked around noise. Pt states he hears a buzzing in both ears for about 10 years. Pt states his ears feel full off and on.)      Patient ID: Srinivasa Oliver is a 68 y.o. 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    Audiogram today with no conductive hearing loss.  There is a very slight high-frequency left-sided asymmetry.  Pattern most consistent with a combination of presbycusis and noise exposure type hearing loss.    Labs:  WBC   Date Value Ref Range Status   10/17/2023 7.09 3.90 - 12.70 K/uL Final     Hemoglobin   Date Value Ref Range Status   10/17/2023 15.7 14.0 - 18.0 g/dL Final     Platelets   Date Value Ref Range Status   10/17/2023 189 150 - 450 K/uL Final     Creatinine   Date Value Ref Range Status   10/17/2023 0.8 0.5 - 1.4 mg/dL Final     TSH   Date Value Ref Range Status   2024 1.163 0.400 - 4.000 uIU/mL Final       Past Medical History  He has a past medical history of Adenomatous colon polyp, Adenomatous colon polyp, Anxiety, Atrial fibrillation, Blood transfusion, Centrilobular emphysema, CHF (congestive heart failure), Coronary artery disease, Emphysema of lung, Hypertension, Myocardial infarction, Renal disorder, Respiratory failure, S/P coronary artery stent placement, Staph skin infection, and Ulcer.    Family / Surgical / Social History  His family history includes Diabetes in his father; Heart  disease in his father, maternal aunt, and mother; No Known Problems in his brother, daughter, sister, and son.    Past Surgical History:   Procedure Laterality Date    COLONOSCOPY  01/28/2011    Dr Choudhary, tubular adenoma    CORONARY ARTERY BYPASS GRAFT  2005    CORONARY STENT PLACEMENT      ENDOBRONCHIAL ULTRASOUND N/A 7/18/2022    Procedure: ENDOBRONCHIAL ULTRASOUND (EBUS);  Surgeon: Alli Sandra MD;  Location: [facility];  Service: Pulmonary;  Laterality: N/A;    NAVIGATIONAL BRONCHOSCOPY Bilateral 7/18/2022    Procedure: BRONCHOSCOPY, NAVIGATIONAL;  Surgeon: Alli Sandra MD;  Location: [facility];  Service: Pulmonary;  Laterality: Bilateral;       Social History     Tobacco Use    Smoking status: Every Day     Current packs/day: 1.00     Average packs/day: 0.8 packs/day for 48.3 years (37.8 ttl pk-yrs)     Types: Cigarettes     Start date: 5/31/1976    Smokeless tobacco: Never    Tobacco comments:     Pt currently smoking 1ppd. Ready to quit, has a patch on while admitted for cravings. Has made multiple attempts to quit and has started again.  Pt is a [employer] Member: [id number]     Pt smokes 1 pack a day 10/02/2024   Substance and Sexual Activity    Alcohol use: No     Alcohol/week: 0.0 standard drinks of alcohol    Drug use: No    Sexual activity: Not on file       Medications  He has a current medication list which includes the following prescription(s): albuterol, albuterol, ascorbic acid (vitamin c), aspirin, benzonatate, ciclopirox, clopidogrel, coenzyme q10-vitamin e, docosahexaenoic acid/epa, escitalopram oxalate, ferrous sulfate, combivent respimat, metoprolol succinate, mirtazapine, rosuvastatin, sildenafil, bupropion, and nicotine.      Allergies  Review of patient's allergies indicates:   Allergen Reactions    Atorvastatin      Other reaction(s): Rash       All medications, allergies, and past history have been reviewed.    Objective:      Vitals:      9/9/2024     1:58 PM 9/23/2024    10:27  "AM 10/2/2024    11:32 AM   Vitals - 1 value per visit   SYSTOLIC 132 104 115   DIASTOLIC 78 58 65   Pulse 70 73 62   SPO2 84 % 83 %    Weight (lb) 171.08 169.53 166.89   Weight (kg) 77.6 76.9 75.7   Height 5' 11" (1.803 m) 5' 11" (1.803 m) 5' 9" (1.753 m)   BMI (Calculated) 23.9 23.7 24.6   Pain Score Zero  Zero       Body surface area is 1.92 meters squared.    Physical Exam:    GENERAL  APPEARANCE -  alert, appears stated age, and cooperative  BARRIER(S) TO COMMUNICATION -  none VOICE - appropriate for age and gender    INTEGUMENTARY  no suspicious head and neck lesions    HEENT  HEAD: Normocephalic, without obvious abnormality, atraumatic  FACE: INSPECTION - Symmetric, no signs of trauma, no suspicious lesion(s)      STRENGTH - facial symmetry intact     EYES  Normal occular alignment and mobility with no visible nystagmus at rest    EARS/NOSE/MOUTH/THROAT  EARS  PINNAE AND EXTERNAL EARS - no suspicious lesion OTOSCOPIC EXAM (surgical microscopy was not used for visualization/instrumentation): EAR EXAM - Normal ear canals, tympanic membranes and mobility, and middle ear spaces bilaterally.  HEARING - functional    NOSE AND SINUSES  EXTERNAL NOSE - Grossly normal for age/sex   MUCOSA - no drainage      CHEST AND LUNG   INSPECTION & AUSCULTATION - normal effort, no stridor    NEUROLOGIC  MENTAL STATUS - alert, interactive CRANIAL NERVES - normal        Procedure(s):  None          Assessment:      Problem List Items Addressed This Visit          ENT    Bilateral hearing loss - Primary     Other Visit Diagnoses       Tinnitus, bilateral                     Plan:      Cleared for amplification.  Information on tinnitus hearing loss and tools were hearing rehabilitation provided.  Smoking cessation both for general health as well as for reduction of tinnitus discussed.    Follow up as needed          Voice recognition software was used in the creation of this note/communication and any sound-alike errors which may " have occurred from its use should be taken in context when interpreting.  If such errors prevent a clear understanding of the note/communication, please contact the office for clarification.

## 2024-10-02 NOTE — PROGRESS NOTES
Srinivasa Oliver was seen on 10/02/2024 for an audiological evaluation. Pt was alone during today's visit. Pertinent complaints today include hearing loss. Pt confirms history of loud noise exposure and denies early onset of genetic family history of hearing loss. Otoscopy revealed no cerumen in both ears. The tympanic membrane was visualized AU prior to proceeding with the hearing testing.     Results reveal a mild-to-severe sensorineural hearing loss for the right ear and  mild-to-severe sensorineural hearing loss for the left ear.    Speech Reception Thresholds were  65 dBHL for the right ear and 70 dBHL for the left ear.    Word recognition scores were good for the right ear and good for the left ear.   Tympanograms were Type A for the right ear and Type A for the left ear.    Recommendations: 1) Follow up with ENT     2) Annual hearing evaluation     3) Hearing aid consult when ready    Audiogram results were reviewed in detail with patient and all questions were answered. Results will be reviewed by the referring provider at the completion of this note. All complaints were addressed during this visit to the patient's satisfaction.

## 2024-10-03 ENCOUNTER — OFFICE VISIT (OUTPATIENT)
Facility: CLINIC | Age: 69
End: 2024-10-03
Payer: MEDICARE

## 2024-10-03 VITALS
TEMPERATURE: 98 F | SYSTOLIC BLOOD PRESSURE: 124 MMHG | HEIGHT: 69 IN | WEIGHT: 167.5 LBS | DIASTOLIC BLOOD PRESSURE: 67 MMHG | OXYGEN SATURATION: 96 % | RESPIRATION RATE: 16 BRPM | BODY MASS INDEX: 24.81 KG/M2 | HEART RATE: 70 BPM

## 2024-10-03 DIAGNOSIS — R91.8 LUNG MASS: ICD-10-CM

## 2024-10-03 DIAGNOSIS — R91.1 LUNG NODULE: Primary | ICD-10-CM

## 2024-10-03 PROCEDURE — 3078F DIAST BP <80 MM HG: CPT | Mod: HCNC,CPTII,S$GLB, | Performed by: INTERNAL MEDICINE

## 2024-10-03 PROCEDURE — G2211 COMPLEX E/M VISIT ADD ON: HCPCS | Mod: HCNC,S$GLB,, | Performed by: INTERNAL MEDICINE

## 2024-10-03 PROCEDURE — 3074F SYST BP LT 130 MM HG: CPT | Mod: HCNC,CPTII,S$GLB, | Performed by: INTERNAL MEDICINE

## 2024-10-03 PROCEDURE — 99999 PR PBB SHADOW E&M-EST. PATIENT-LVL III: CPT | Mod: PBBFAC,HCNC,, | Performed by: INTERNAL MEDICINE

## 2024-10-03 PROCEDURE — 3008F BODY MASS INDEX DOCD: CPT | Mod: HCNC,CPTII,S$GLB, | Performed by: INTERNAL MEDICINE

## 2024-10-03 PROCEDURE — 99205 OFFICE O/P NEW HI 60 MIN: CPT | Mod: HCNC,S$GLB,, | Performed by: INTERNAL MEDICINE

## 2024-10-03 PROCEDURE — 1126F AMNT PAIN NOTED NONE PRSNT: CPT | Mod: HCNC,CPTII,S$GLB, | Performed by: INTERNAL MEDICINE

## 2024-10-03 NOTE — PROGRESS NOTES
[facility] HEM/ONC CONSULTATION      Subjective:       Patient ID: Srinivasa Oliver is a 68 y.o. male.    Chief Complaint: No chief complaint on file.  LEIGHTON lung mass, severe pulmonary fibrosis    Mr. Oliver is a 67yo male with severe pulmonary fibrosis, followed by Dr. Flowers who has also had a large lung mass which has been followed conservatively due to concern over toxicity from treatment and lung disease.  He saw Dr. Sandra in [place] in 2022 for possible biopsy, this was attempted but this was not possible due to safety issues and procedure aborted.   He was presented at their tumor board and radiation was also deemed unsafe given his pulmonary fibrosis issue.       Review of his available imaging shows the fibrosis to be present since 2020.  The first mention of a LEIGHTON mass was in June of 2022.  This was followed shortly after with a PET scan showing hypermetabolism in several areas of the LEIGHTON but risk of biopsy remarked as very high. There is a lack of imaging in our system from 9/2022 till 9/17/2024 which now shows this area in the LEIGHTON to be 7.2cm.      He is referred for recommendations on therapy.      Patient reports his breathing issues have been slowly worsening and he is on O2 but not consistently using it as has been recommended.     He does have CAD and had bypass in 2008.  , htn, denies DM, renal or liver disease.     Patient is a life-long smoker and still smoking at this time.           Past Medical History:   Diagnosis Date    Adenomatous colon polyp 01/28/2011    Adenomatous colon polyp     Anxiety     Atrial fibrillation     Blood transfusion     Centrilobular emphysema 10/16/2023    CHF (congestive heart failure)     Coronary artery disease     Emphysema of lung     Hypertension     Myocardial infarction     Renal disorder     kidney stones    Respiratory failure     S/P coronary artery stent placement 01/07/2013    Staph skin infection     abd/ lanced x 2    Ulcer        Past Surgical  History:   Procedure Laterality Date    COLONOSCOPY  01/28/2011    Dr Choudhary, tubular adenoma    CORONARY ARTERY BYPASS GRAFT  2005    CORONARY STENT PLACEMENT      ENDOBRONCHIAL ULTRASOUND N/A 7/18/2022    Procedure: ENDOBRONCHIAL ULTRASOUND (EBUS);  Surgeon: Alli Sandra MD;  Location: [facility];  Service: Pulmonary;  Laterality: N/A;    NAVIGATIONAL BRONCHOSCOPY Bilateral 7/18/2022    Procedure: BRONCHOSCOPY, NAVIGATIONAL;  Surgeon: Alli Sandra MD;  Location: [facility];  Service: Pulmonary;  Laterality: Bilateral;       Social History     Socioeconomic History    Marital status:    Tobacco Use    Smoking status: Every Day     Current packs/day: 1.00     Average packs/day: 0.8 packs/day for 48.3 years (37.8 ttl pk-yrs)     Types: Cigarettes     Start date: 5/31/1976    Smokeless tobacco: Never    Tobacco comments:     Pt currently smoking 1ppd. Ready to quit, has a patch on while admitted for cravings. Has made multiple attempts to quit and has started again.  Pt is a [employer] Member: [id number]     Pt smokes 1 pack a day 10/02/2024   Substance and Sexual Activity    Alcohol use: No     Alcohol/week: 0.0 standard drinks of alcohol    Drug use: No     Social Drivers of Health     Financial Resource Strain: Low Risk  (8/21/2024)    Overall Financial Resource Strain (CARDIA)     Difficulty of Paying Living Expenses: Not very hard   Food Insecurity: No Food Insecurity (8/21/2024)    Hunger Vital Sign     Worried About Running Out of Food in the Last Year: Never true     Ran Out of Food in the Last Year: Never true   Transportation Needs: No Transportation Needs (8/21/2024)    PRAPARE - Transportation     Lack of Transportation (Medical): No     Lack of Transportation (Non-Medical): No   Physical Activity: Inactive (8/21/2024)    Exercise Vital Sign     Days of Exercise per Week: 0 days     Minutes of Exercise per Session: 0 min   Stress: No Stress Concern Present (8/21/2024)    [employer] - Occupational Stress Questionnaire     Feeling of Stress : Only a little   Housing Stability: Low Risk  (8/21/2024)    Housing Stability Vital Sign     Unable to Pay for Housing in the Last Year: No     Homeless in the Last Year: No       Family History   Problem Relation Name Age of Onset    Heart disease Mother      Heart disease Father      Diabetes Father      Heart disease Maternal Aunt      No Known Problems Sister      No Known Problems Brother      No Known Problems Daughter      No Known Problems Son         Review of patient's allergies indicates:   Allergen Reactions    Atorvastatin      Other reaction(s): Rash       Current Outpatient Medications:     albuterol (PROVENTIL HFA) 90 mcg/actuation inhaler, Inhale 2 puffs into the lungs every 4 (four) hours as needed for Wheezing. Every 4-6 hours PRN, Disp: 18 g, Rfl: 11    albuterol (PROVENTIL) 2.5 mg /3 mL (0.083 %) nebulizer solution, Take 3 mLs (2.5 mg total) by nebulization every 6 (six) hours as needed for Wheezing. Rescue, Disp: 120 mL, Rfl: 11    ascorbic acid, vitamin C, (VITAMIN C) 1000 MG tablet, Take 1,000 mg by mouth once daily., Disp: , Rfl:     aspirin (ECOTRIN) 81 MG EC tablet, Take 81 mg by mouth once daily., Disp: , Rfl:     benzonatate (TESSALON) 200 MG capsule, Take 1 capsule (200 mg total) by mouth 3 (three) times daily as needed for Cough., Disp: 60 capsule, Rfl: 2    buPROPion (WELLBUTRIN SR) 150 MG TBSR 12 hr tablet, Take 1 tablet once a day for 7 days then increase to twice a day (Patient not taking: Reported on 8/21/2024), Disp: 60 tablet, Rfl: 0    ciclopirox (PENLAC) 8 % Soln, Apply topically nightly. Paint on affected toenails and surrounding skin, Disp: 6.6 mL, Rfl: 2    clopidogreL (PLAVIX) 75 mg tablet, Take 1 tablet (75 mg total) by mouth once daily. Need office visit before next refill, last seen 1/2019. This will be the LAST Rx if visit is not made., Disp: 90 tablet, Rfl: 3    coenzyme Q10-vitamin E  "100-100 mg-unit Cap, Take 200 mg by mouth once daily. Every day, Disp: , Rfl:     DOCOSAHEXANOIC ACID/EPA (FISH OIL ORAL), Take 1 capsule by mouth 2 (two) times daily as needed. Twice a day, Disp: , Rfl:     EScitalopram oxalate (LEXAPRO) 10 MG tablet, Take 1 tablet (10 mg total) by mouth once daily., Disp: 90 tablet, Rfl: 3    ferrous sulfate (FEOSOL) 325 mg (65 mg iron) Tab tablet, Take 325 mg by mouth daily with breakfast., Disp: , Rfl:     ipratropium-albuteroL (COMBIVENT RESPIMAT)  mcg/actuation inhaler, Inhale 1 puff into the lungs every 6 (six) hours as needed for Wheezing. Rescue, Disp: , Rfl:     metoprolol succinate (TOPROL-XL) 50 MG 24 hr tablet, Take 1 tablet (50 mg total) by mouth every evening., Disp: 90 tablet, Rfl: 3    mirtazapine (REMERON) 7.5 MG Tab, Take 1 tablet (7.5 mg total) by mouth every evening., Disp: 30 tablet, Rfl: 11    nicotine (NICODERM CQ) 21 mg/24 hr, Place 1 patch onto the skin once daily. (Patient not taking: Reported on 10/2/2024), Disp: 28 patch, Rfl: 0    rosuvastatin (CRESTOR) 20 MG tablet, Take 1 tablet (20 mg total) by mouth every evening., Disp: 90 tablet, Rfl: 3    sildenafil (REVATIO) 20 mg Tab, Take 1 tablet (20 mg total) by mouth 3 (three) times daily., Disp: 90 tablet, Rfl: 2    All medications and past history have been reviewed.    Review of Systems   Constitutional:  Negative for fever and unexpected weight change.   HENT:  Negative for nosebleeds.    Respiratory:  Positive for shortness of breath. Negative for chest tightness.    Cardiovascular:  Negative for chest pain.   Gastrointestinal:  Negative for abdominal pain and blood in stool.   Genitourinary:  Negative for hematuria.   Skin:  Negative for rash.   Hematological:  Does not bruise/bleed easily.       Objective:        /67   Pulse 70   Temp 98.2 °F (36.8 °C)   Resp 16   Ht 5' 9" (1.753 m)   Wt 76 kg (167 lb 8 oz)   SpO2 96%   BMI 24.74 kg/m²     Physical Exam  Constitutional:       " Appearance: Normal appearance.   HENT:      Head: Normocephalic and atraumatic.   Eyes:      General: No scleral icterus.     Conjunctiva/sclera: Conjunctivae normal.   Cardiovascular:      Rate and Rhythm: Normal rate.      Pulses: Normal pulses.   Abdominal:      General: Abdomen is flat.   Neurological:      General: No focal deficit present.      Mental Status: He is alert and oriented to person, place, and time.   Psychiatric:         Mood and Affect: Mood normal.         Behavior: Behavior normal.           Lab  No results found for this or any previous visit (from the past 2 weeks).  CMP  Sodium   Date Value Ref Range Status   10/17/2023 140 136 - 145 mmol/L Final     Potassium   Date Value Ref Range Status   10/17/2023 4.2 3.5 - 5.1 mmol/L Final     Chloride   Date Value Ref Range Status   10/17/2023 107 95 - 110 mmol/L Final     CO2   Date Value Ref Range Status   10/17/2023 24 23 - 29 mmol/L Final     Glucose   Date Value Ref Range Status   10/17/2023 108 70 - 110 mg/dL Final     BUN   Date Value Ref Range Status   10/17/2023 10 8 - 23 mg/dL Final     Creatinine   Date Value Ref Range Status   10/17/2023 0.8 0.5 - 1.4 mg/dL Final   01/28/2013 0.7 0.5 - 1.4 mg/dL Final     Calcium   Date Value Ref Range Status   10/17/2023 9.8 8.7 - 10.5 mg/dL Final   01/28/2013 9.4 8.7 - 10.5 mg/dL Final     Total Protein   Date Value Ref Range Status   10/17/2023 8.1 6.0 - 8.4 g/dL Final     Albumin   Date Value Ref Range Status   10/17/2023 3.4 (L) 3.5 - 5.2 g/dL Final     Total Bilirubin   Date Value Ref Range Status   10/17/2023 0.6 0.1 - 1.0 mg/dL Final     Comment:     For infants and newborns, interpretation of results should be based  on gestational age, weight and in agreement with clinical  observations.    Premature Infant recommended reference ranges:  Up to 24 hours.............<8.0 mg/dL  Up to 48 hours............<12.0 mg/dL  3-5 days..................<15.0 mg/dL  6-29 days.................<15.0 mg/dL        Alkaline Phosphatase   Date Value Ref Range Status   10/17/2023 117 55 - 135 U/L Final   06/26/2013 112 55 - 135 U/L Final     AST   Date Value Ref Range Status   10/17/2023 17 10 - 40 U/L Final   06/26/2013 11 10 - 40 U/L Final     ALT   Date Value Ref Range Status   10/17/2023 7 (L) 10 - 44 U/L Final     Anion Gap   Date Value Ref Range Status   10/17/2023 9 8 - 16 mmol/L Final   01/28/2013 10 5 - 15 meq/L Final     eGFR if    Date Value Ref Range Status   07/18/2022 >60 >60 mL/min/1.73 m^2 Final     eGFR if non    Date Value Ref Range Status   07/18/2022 >60 >60 mL/min/1.73 m^2 Final     Comment:     Calculation used to obtain the estimated glomerular filtration  rate (eGFR) is the CKD-EPI equation.            Specimen (24h ago, onward)      None                  All lab results and imaging results have been reviewed and discussed with the patient.     Assessment:       1. Lung nodule    2. Lung mass      Problem List Items Addressed This Visit       Lung nodule - Primary     I had a long discussion with patient and wife about this process.  This certainly appears c/w lung cancer but no biopsy seems possible given his underlying lung disease.  I also feel that radiation would be hazardous but would like them to weigh in on this process.  Perhaps SBRT may be possible?      Could consider chemotherapy but would be doing this without biopsy proven cancer and drug choice would be empiric.  I would not use IO given the risk of pulmonary disease which would likely take his life if he developed maty a mild case of this.      Will have him see rad/onc.  Consider presentation at tumor board and will see him back after this is done.           Relevant Orders    Ambulatory referral/consult to Radiation Oncology     Other Visit Diagnoses       Lung mass               Cancer Staging   No matching staging information was found for the patient.        Plan:         Follow up in about 4 weeks  (around 10/31/2024).       The plan was discussed with the patient and all questions/concerns have been answered to the patient's satisfaction.

## 2024-10-03 NOTE — ASSESSMENT & PLAN NOTE
I had a long discussion with patient and wife about this process.  This certainly appears c/w lung cancer but no biopsy seems possible given his underlying lung disease.  I also feel that radiation would be hazardous but would like them to weigh in on this process.  Perhaps SBRT may be possible?      Could consider chemotherapy but would be doing this without biopsy proven cancer and drug choice would be empiric.  I would not use IO given the risk of pulmonary disease which would likely take his life if he developed maty a mild case of this.      Will have him see rad/onc.  Consider presentation at tumor board and will see him back after this is done.

## 2024-10-08 ENCOUNTER — OUTPATIENT CASE MANAGEMENT (OUTPATIENT)
Dept: ADMINISTRATIVE | Facility: OTHER | Age: 69
End: 2024-10-08
Payer: MEDICARE

## 2024-10-08 NOTE — PROGRESS NOTES
Outpatient Care Management   - Care Plan Follow Up    Patient: Srinivasa Oliver  MRN:  [id number]  Date of Service:  10/8/2024  Completed by:  Melani Womack LMSW  Referral Date: 09/23/2024    Reason for Visit   Patient presents with    OPCM SW First Assessment Attempt     10/8/2024  1st attempt to complete Follow-Up for Outpatient Care Management, left message.        Brief Summary: SW attempt to reach patient and or spouse for a follow-up call , however no answer. SW unable to leave a message as mail box is full. SW will attempt again on 10/10/2024    Complex Care Plan     Care plan was discussed and completed today with input from patient and/or caregiver.    Patient Instructions     No follow-ups on file.

## 2024-10-10 ENCOUNTER — OUTPATIENT CASE MANAGEMENT (OUTPATIENT)
Dept: ADMINISTRATIVE | Facility: OTHER | Age: 69
End: 2024-10-10
Payer: MEDICARE

## 2024-10-10 NOTE — PROGRESS NOTES
Outpatient Care Management   - Care Plan Follow Up    Patient: Srinivasa Oliver  MRN:  [id number]  Date of Service:  10/10/2024  Completed by:  Melani Womack LMSW  Referral Date: 09/23/2024    Reason for Visit   Patient presents with    OPCM SW Second Follow-up Attempt       Brief Summary: SW attempt to follow-up with patient spouse regarding resources. Spouse reports not feeling well . Spouse advised SW she will follow-up with her on tomorrow.     Complex Care Plan     Care plan was discussed and completed today with input from patient and/or caregiver.    Patient Instructions     No follow-ups on file.

## 2024-10-15 ENCOUNTER — OUTPATIENT CASE MANAGEMENT (OUTPATIENT)
Dept: ADMINISTRATIVE | Facility: OTHER | Age: 69
End: 2024-10-15
Payer: MEDICARE

## 2024-10-15 NOTE — PROGRESS NOTES
Outpatient Care Management   - Care Plan Follow Up    Patient: Srinivasa Oliver  MRN:  [id number]  Date of Service:  10/15/2024  Completed by:  Melain Womack LMSW  Referral Date: 09/23/2024    Reason for Visit   Patient presents with    OPCM SW Third Assessment Attempt    Case Closure       Brief Summary: SW attempt to reach patient and or spouse for a follow-up call , however no answer. SW left a message for a return call. Attempt letter sent to patient portal. SW will close case as unable to reach.     Complex Care Plan     Care plan was discussed and completed today with input from patient and/or caregiver.    Patient Instructions     No follow-ups on file.

## 2024-10-15 NOTE — LETTER
Srinivasa Oliver  [address]  [place] [address]      Dear Srinivasa Oliver,     I am writing from the Outpatient Care Management Department at [facility]. I have been unsuccessful at reaching you since we spoke on 10/01/2024.  I hope you found the assistance previously provided to you helpful.     Please contact me at [phone] from 8:00AM to 4:30 PM on Monday thru Friday.     As you know, [facility] also has a program with a nurse available 24/7 to answer questions or provide medical advice.  [facility] can be reached at [phone].    Sincerely,       Melani Womack, [facility]  Outpatient Care Management

## 2024-10-18 ENCOUNTER — SOCIAL WORK (OUTPATIENT)
Dept: HEMATOLOGY/ONCOLOGY | Facility: CLINIC | Age: 69
End: 2024-10-18
Payer: MEDICARE

## 2024-10-18 ENCOUNTER — OFFICE VISIT (OUTPATIENT)
Dept: RADIATION ONCOLOGY | Facility: CLINIC | Age: 69
End: 2024-10-18
Payer: MEDICARE

## 2024-10-18 VITALS
OXYGEN SATURATION: 88 % | DIASTOLIC BLOOD PRESSURE: 64 MMHG | BODY MASS INDEX: 24.93 KG/M2 | SYSTOLIC BLOOD PRESSURE: 123 MMHG | WEIGHT: 168.81 LBS | HEART RATE: 59 BPM | RESPIRATION RATE: 16 BRPM

## 2024-10-18 DIAGNOSIS — R91.1 LUNG NODULE: ICD-10-CM

## 2024-10-18 DIAGNOSIS — J84.10 PULMONARY FIBROSIS: Primary | ICD-10-CM

## 2024-10-18 NOTE — Clinical Note
[facility]-- Sadly I don't think his lungs could safely tolerate any local therapy. Too risky and our targets have only grown as his lungs have declined over last 2yrs. Agree with PET and MRI--could use RT at distant sites if M1 (brain, ie). If no met bx targets, advise EBUS (prior tolerated) as CT-guided too risky to perhaps direct systemic therapy. Of note--both asbestos and smoking hx.

## 2024-10-18 NOTE — PROGRESS NOTES
Srinivasa Oliver  [id number]  1955  10/18/2024  Leander Light Md  [address] [facility]  [address]  [place] [id number]    REASON FOR CONSULTATION: Suspected NSCLC LEIGHTON, [id number]    TREATMENT GOAL:  Undetermined at present    HISTORY OF PRESENT ILLNESS:   Srinivasa Oliver is a 68 y.o. male smoker (1ppd, 50pk-yrs) with end-stage pulmonary fibrosis on supplemental oxygen @ 4-6L (occupational exposure to asbestos) and PMHx: CAD/CABG/PCI with family history of lung cancer (sister) following with Dr. Flowers with low-dose CT chest from June 2022 revealing a 6 mm right lung mass and a 2.8 cm left lung mass.  PET confirmed multiple contiguous hypermetabolic pleural-based left upper lobe masses with irregular margins, largest 2.2 cm with SUV 7.7. Also noted was mildly enlarged, stable mediastinal adenopathy including AP window lymph with SUV 4.0, favored reactive.  He underwent EBUS with Dr. Sandra that was negative for malignancy at LEIGHTON, 4R, 4L, 7, 11L--all with adequate elvira sampling. NODIFY CDT estimated risk of malignancy at 17%; nodify xl2 quoted 5%.     He met with Dr. Laura who expressed concern regarding his severe interstitial lung disease and risk for pneumonitis with empiric radiotherapy, advising biopsy to confirm disease before consideration of therapy.  His case was presented at multidisciplinary tumor Conference with recommendation for continued surveillance noting short interval CT chest measured left upper lobe consolidation to be decreased in size and density.     Patient was lost imaging follow-up and returns with noncontrasted CT of the chest appreciating opacification of the left upper lung field with central cavitation over an area measuring 7.2 x 5.4 cm with progression of left mediastinal and left paratracheal adenopathy.  Patient has followed up with Dr. Flowers who is advising biopsy.  Patient was seen by Dr. Light and is referred to discuss radiotherapy options.  Tumor board presentation  planned.    PET:  MRI B:    9/22 PFTs   FEV1 2.79L   DLCO  25.5%    Patient has chronic shortness but SO reports he is active.  He is advised to use 4-6 L of supplemental oxygen but today presents off of oxygen with O2 Sat 87%.  Denies fever, chills, chest pain.  Has nonproductive cough.  Denies headache, seizure activity, focal numbness or weakness, vision or hearing changes, speech or cognition changes.  He has been losing weight over the past several years.    Review of systems otherwise negative unless indicated in HPI.    Past Medical History:   Diagnosis Date    Adenomatous colon polyp 01/28/2011    Adenomatous colon polyp     Anxiety     Atrial fibrillation     Blood transfusion     Centrilobular emphysema 10/16/2023    CHF (congestive heart failure)     Coronary artery disease     Emphysema of lung     Hypertension     Myocardial infarction     Renal disorder     kidney stones    Respiratory failure     S/P coronary artery stent placement 01/07/2013    Staph skin infection     abd/ lanced x 2    Ulcer      Past Surgical History:   Procedure Laterality Date    COLONOSCOPY  01/28/2011    Dr Choudhary, tubular adenoma    CORONARY ARTERY BYPASS GRAFT  2005    CORONARY STENT PLACEMENT      ENDOBRONCHIAL ULTRASOUND N/A 7/18/2022    Procedure: ENDOBRONCHIAL ULTRASOUND (EBUS);  Surgeon: Alli Sandra MD;  Location: [facility];  Service: Pulmonary;  Laterality: N/A;    NAVIGATIONAL BRONCHOSCOPY Bilateral 7/18/2022    Procedure: BRONCHOSCOPY, NAVIGATIONAL;  Surgeon: Alli Sandra MD;  Location: [facility];  Service: Pulmonary;  Laterality: Bilateral;     Social History     Socioeconomic History    Marital status:    Tobacco Use    Smoking status: Every Day     Current packs/day: 1.00     Average packs/day: 0.8 packs/day for 48.4 years (37.9 ttl pk-yrs)     Types: Cigarettes     Start date: 5/31/1976    Smokeless tobacco: Never    Tobacco comments:     Pt currently smoking 1ppd. Ready to quit, has a patch on  while admitted for cravings. Has made multiple attempts to quit and has started again.  Pt is a [employer] Member: [id number]     Pt smokes 1 pack a day 10/02/2024          1 pk qd   Substance and Sexual Activity    Alcohol use: No     Alcohol/week: 0.0 standard drinks of alcohol    Drug use: No     Social Drivers of Health     Financial Resource Strain: Low Risk  (8/21/2024)    Overall Financial Resource Strain (CARDIA)     Difficulty of Paying Living Expenses: Not very hard   Food Insecurity: No Food Insecurity (8/21/2024)    Hunger Vital Sign     Worried About Running Out of Food in the Last Year: Never true     Ran Out of Food in the Last Year: Never true   Transportation Needs: No Transportation Needs (8/21/2024)    PRAPARE - Transportation     Lack of Transportation (Medical): No     Lack of Transportation (Non-Medical): No   Physical Activity: Inactive (8/21/2024)    Exercise Vital Sign     Days of Exercise per Week: 0 days     Minutes of Exercise per Session: 0 min   Stress: No Stress Concern Present (8/21/2024)    [employer] - Occupational Stress Questionnaire     Feeling of Stress : Only a little   Housing Stability: Low Risk  (8/21/2024)    Housing Stability Vital Sign     Unable to Pay for Housing in the Last Year: No     Homeless in the Last Year: No     Family History   Problem Relation Name Age of Onset    Heart disease Mother      Heart disease Father      Diabetes Father      Heart disease Maternal Aunt      No Known Problems Sister      No Known Problems Brother      No Known Problems Daughter      No Known Problems Son         PRIOR HISTORY OF CHEMOTHERAPY OR RADIOTHERAPY: Please see HPI for patients prior oncologic history.    Medication List with Changes/Refills   Current Medications    ALBUTEROL (PROVENTIL HFA) 90 MCG/ACTUATION INHALER    Inhale 2 puffs into the lungs every 4 (four) hours as needed for Wheezing. Every 4-6 hours PRN    ALBUTEROL (PROVENTIL) 2.5 MG  /3 ML (0.083 %) NEBULIZER SOLUTION    Take 3 mLs (2.5 mg total) by nebulization every 6 (six) hours as needed for Wheezing. Rescue    ASCORBIC ACID, VITAMIN C, (VITAMIN C) 1000 MG TABLET    Take 1,000 mg by mouth once daily.    ASPIRIN (ECOTRIN) 81 MG EC TABLET    Take 81 mg by mouth once daily.    BUPROPION (WELLBUTRIN SR) 150 MG TBSR 12 HR TABLET    Take 1 tablet once a day for 7 days then increase to twice a day    CICLOPIROX (PENLAC) 8 % SOLN    Apply topically nightly. Paint on affected toenails and surrounding skin    CLOPIDOGREL (PLAVIX) 75 MG TABLET    Take 1 tablet (75 mg total) by mouth once daily. Need office visit before next refill, last seen 1/2019. This will be the LAST Rx if visit is not made.    COENZYME Q10-VITAMIN E 100-100 MG-UNIT CAP    Take 200 mg by mouth once daily. Every day    DOCOSAHEXANOIC ACID/EPA (FISH OIL ORAL)    Take 1 capsule by mouth 2 (two) times daily as needed. Twice a day    ESCITALOPRAM OXALATE (LEXAPRO) 10 MG TABLET    Take 1 tablet (10 mg total) by mouth once daily.    FERROUS SULFATE (FEOSOL) 325 MG (65 MG IRON) TAB TABLET    Take 325 mg by mouth daily with breakfast.    IPRATROPIUM-ALBUTEROL (COMBIVENT RESPIMAT)  MCG/ACTUATION INHALER    Inhale 1 puff into the lungs every 6 (six) hours as needed for Wheezing. Rescue    METOPROLOL SUCCINATE (TOPROL-XL) 50 MG 24 HR TABLET    Take 1 tablet (50 mg total) by mouth every evening.    MIRTAZAPINE (REMERON) 7.5 MG TAB    Take 1 tablet (7.5 mg total) by mouth every evening.    NICOTINE (NICODERM CQ) 21 MG/24 HR    Place 1 patch onto the skin once daily.    ROSUVASTATIN (CRESTOR) 20 MG TABLET    Take 1 tablet (20 mg total) by mouth every evening.    SILDENAFIL (REVATIO) 20 MG TAB    Take 1 tablet (20 mg total) by mouth 3 (three) times daily.     Review of patient's allergies indicates:   Allergen Reactions    Atorvastatin      Other reaction(s): Rash       QUALITY OF LIFE: 70%- Cares for Self: Unable to Carry on Normal Activity  or Active Work    Vitals:    10/18/24 1043   BP: 123/64   Pulse: (!) 59   Resp: 16   SpO2: (!) 88%   Weight: 76.6 kg (168 lb 12.8 oz)   PainSc: 0-No pain     Body mass index is 24.93 kg/m².    PHYSICAL EXAM:   GENERAL: alert; in no apparent distress.   HEAD: normocephalic, atraumatic.  EYES: pupils are equal, round, reactive to light and accommodation. Sclera anicteric. Conjunctiva not injected.   NOSE/THROAT: no nasal erythema or rhinorrhea. Oropharynx pink, without erythema, ulcerations or thrush.   NECK: no cervical motion rigidity; supple with no masses.    CHEST: Patient is speaking comfortably on room air with normal work of breathing without using accessory muscles of respiration.  CARDIOVASCULAR: regular rate and rhythm  ABDOMEN: soft, nontender, nondistended.   MUSCULOSKELETAL: no tenderness to palpation along the spine or scapulae. Normal range of motion.  NEUROLOGIC: cranial nerves II-XII intact bilaterally. Strength 5/5 in bilateral upper and lower extremities. No sensory deficits appreciated. Normal gait.  EXTREMITIES: + clubbing; ? Peripheral cyanosis; no edema.  SKIN: no erythema, rashes, ulcerations noted.     REVIEW OF IMAGING/PATHOLOGY/LABS: Please see HPI. All images reviewed personally by dictating physician.     ASSESSMENT: Srinivasa Oliver is a 68 y.o. male with Suspected NSCLC LEIGHTON, [id number]  PLAN:  Srinivasa lOiver presents with severe interstitial lung disease with prior asbestos exposure and heavy active smoking history on supplemental oxygen at 4-6 L of O2, notably not wearing his oxygen at today's visit with oxygen saturation of 87% complaining today of only chronic shortness of breath.  I strongly urged him to wear his oxygen particularly when active.  Also advised smoking cessation.  Left upper lobe mass, previously biopsy-proven negative for malignancy has grown in the 2 year interval since his last imaging to now greater than 7 cm with questionable atelectasis component and  progression mediastinal adenopathy (N2-3).  I agree with staging this suspected malignancy using PET, MRI brain and advised biopsy.  While I anticipate yield with CT-guided biopsy, there is likely significant pneumothorax risk in his case and advise consideration of EBUS which he tolerated previously.  Recommend awaiting results of PET as there may be a distant site more accessible to biopsy.  With tissue, he may be a candidate for (targeted?) systemic therapy.  Doubtful he will receive immunotherapy with end-stage ILD.    Regarding local therapy, patient is certainly not a surgical candidate due to poor lung reserve.  Similarly, I have reservations regarding his ability to tolerate any radiotherapy to the chest.  I concur with Dr. Laura who met with the patient in 2022 when his lungs were 2 years younger and tumor with significantly smaller that radiotherapy at that time would have been very risky.  Certainly that risk has increased in the interim and is likely prohibitive of radiotherapy.  Patient voiced understanding.    Will cc Huey Fields and Lionel with recommendations.     The patient has our contact information and understands that they are free to contact us at any time with questions or concerns regarding radiation therapy.     DISPOSITION: RTC PRN     I have personally seen and evaluated this patient with a high complexity diagnosis.      60 minutes were dedicated to reviewing/interpreting pertinent laboratory/imaging/pathology as well as prior consultations; reviewing and performing history and physical; counseling patient on oncologic recommendations; documentation in the electronic medical record including ordering of additional tests and/or radiation treatment protocol; and coordination of care with physicians with referrals placed as appropriate.    PHYSICIAN: Sher Victor Jr, MD    Thank you for the opportunity to meet and consult with Srinivasa Oliver.   Please feel free to contact me to  discuss the above recommendation further.

## 2024-10-18 NOTE — PROGRESS NOTES
Srinivasa Oliver is a 68 year old diagnosed with a lung mass.  Patient is here today for a radiation consult with Dr. Victor.  I met with patient to complete new patient orientation and the NCCN Distress Screening; patient indicated a rating of 4.  Patient's wife stated that a  called them to help with completing the [facility] Financial Assistance application and other needs.  Patient denied needing mental health supports.

## 2024-10-30 DIAGNOSIS — I25.10 CORONARY ARTERY DISEASE INVOLVING NATIVE CORONARY ARTERY WITHOUT ANGINA PECTORIS, UNSPECIFIED WHETHER NATIVE OR TRANSPLANTED HEART: ICD-10-CM

## 2024-10-30 DIAGNOSIS — I10 ESSENTIAL HYPERTENSION: ICD-10-CM

## 2024-10-30 DIAGNOSIS — I73.9 PAD (PERIPHERAL ARTERY DISEASE): ICD-10-CM

## 2024-10-30 DIAGNOSIS — E78.5 HYPERLIPIDEMIA, UNSPECIFIED HYPERLIPIDEMIA TYPE: ICD-10-CM

## 2024-10-30 DIAGNOSIS — F32.9 REACTIVE DEPRESSION: ICD-10-CM

## 2024-10-30 DIAGNOSIS — Z95.1 S/P CABG X 3: ICD-10-CM

## 2024-10-30 RX ORDER — ROSUVASTATIN CALCIUM 20 MG/1
20 TABLET, COATED ORAL NIGHTLY
Qty: 90 TABLET | Refills: 0 | Status: SHIPPED | OUTPATIENT
Start: 2024-10-30

## 2024-10-30 RX ORDER — ESCITALOPRAM OXALATE 10 MG/1
10 TABLET ORAL
Qty: 90 TABLET | Refills: 0 | Status: SHIPPED | OUTPATIENT
Start: 2024-10-30

## 2024-10-30 RX ORDER — CLOPIDOGREL BISULFATE 75 MG/1
75 TABLET ORAL
Qty: 90 TABLET | Refills: 0 | Status: SHIPPED | OUTPATIENT
Start: 2024-10-30

## 2024-11-06 ENCOUNTER — OFFICE VISIT (OUTPATIENT)
Facility: CLINIC | Age: 69
End: 2024-11-06
Payer: MEDICARE

## 2024-11-06 VITALS
WEIGHT: 167.88 LBS | DIASTOLIC BLOOD PRESSURE: 64 MMHG | HEART RATE: 66 BPM | BODY MASS INDEX: 24.79 KG/M2 | TEMPERATURE: 99 F | SYSTOLIC BLOOD PRESSURE: 114 MMHG | RESPIRATION RATE: 16 BRPM

## 2024-11-06 DIAGNOSIS — R91.1 LUNG NODULE: Primary | ICD-10-CM

## 2024-11-06 PROCEDURE — 3288F FALL RISK ASSESSMENT DOCD: CPT | Mod: CPTII,S$GLB,, | Performed by: INTERNAL MEDICINE

## 2024-11-06 PROCEDURE — 1126F AMNT PAIN NOTED NONE PRSNT: CPT | Mod: CPTII,S$GLB,, | Performed by: INTERNAL MEDICINE

## 2024-11-06 PROCEDURE — 3008F BODY MASS INDEX DOCD: CPT | Mod: CPTII,S$GLB,, | Performed by: INTERNAL MEDICINE

## 2024-11-06 PROCEDURE — 1101F PT FALLS ASSESS-DOCD LE1/YR: CPT | Mod: CPTII,S$GLB,, | Performed by: INTERNAL MEDICINE

## 2024-11-06 PROCEDURE — G2211 COMPLEX E/M VISIT ADD ON: HCPCS | Mod: S$GLB,,, | Performed by: INTERNAL MEDICINE

## 2024-11-06 PROCEDURE — 3078F DIAST BP <80 MM HG: CPT | Mod: CPTII,S$GLB,, | Performed by: INTERNAL MEDICINE

## 2024-11-06 PROCEDURE — 99999 PR PBB SHADOW E&M-EST. PATIENT-LVL III: CPT | Mod: PBBFAC,HCNC,, | Performed by: INTERNAL MEDICINE

## 2024-11-06 PROCEDURE — 99214 OFFICE O/P EST MOD 30 MIN: CPT | Mod: S$GLB,,, | Performed by: INTERNAL MEDICINE

## 2024-11-06 PROCEDURE — 1159F MED LIST DOCD IN RCRD: CPT | Mod: CPTII,S$GLB,, | Performed by: INTERNAL MEDICINE

## 2024-11-06 PROCEDURE — 3074F SYST BP LT 130 MM HG: CPT | Mod: CPTII,S$GLB,, | Performed by: INTERNAL MEDICINE

## 2024-11-06 NOTE — PROGRESS NOTES
PROGRESS NOTE    Subjective:       Patient ID: Srinivasa Oliver is a 68 y.o. male.    9/17/2024-CT Chest:  7.2 x 5.4cm mass-like opacification in the LEIGHTON.  Left MS/paratracheal node    No biopsy possible due to safety issues  No radiation possible due to pulmonary disease.     PET to be done 11/13/2024    Chief Complaint:  No chief complaint on file.  LEIGHTON pulmonary mass c/w lung cancer-no biopsy    Severe pulmonary fibrosis    Not a biopsy/surgical or radiation candidate.     History of Present Illness:   Srinivasa Oliver is a 68 y.o. male who presents for follow up of lung tumor.     No new complaints. Remains on oxygen concentrator.                Current Outpatient Medications:     albuterol (PROVENTIL HFA) 90 mcg/actuation inhaler, Inhale 2 puffs into the lungs every 4 (four) hours as needed for Wheezing. Every 4-6 hours PRN, Disp: 18 g, Rfl: 11    ascorbic acid, vitamin C, (VITAMIN C) 1000 MG tablet, Take 1,000 mg by mouth once daily., Disp: , Rfl:     aspirin (ECOTRIN) 81 MG EC tablet, Take 81 mg by mouth once daily., Disp: , Rfl:     buPROPion (WELLBUTRIN SR) 150 MG TBSR 12 hr tablet, Take 1 tablet once a day for 7 days then increase to twice a day (Patient not taking: Reported on 8/21/2024), Disp: 60 tablet, Rfl: 0    ciclopirox (PENLAC) 8 % Soln, Apply topically nightly. Paint on affected toenails and surrounding skin, Disp: 6.6 mL, Rfl: 2    clopidogreL (PLAVIX) 75 mg tablet, TAKE 1 TABLET BY MOUTH ONCE DAILY . APPOINTMENT REQUIRED FOR FUTURE REFILLS, Disp: 90 tablet, Rfl: 0    coenzyme Q10-vitamin E 100-100 mg-unit Cap, Take 200 mg by mouth once daily. Every day, Disp: , Rfl:     DOCOSAHEXANOIC ACID/EPA (FISH OIL ORAL), Take 1 capsule by mouth 2 (two) times daily as needed. Twice a day, Disp: , Rfl:     EScitalopram oxalate (LEXAPRO) 10 MG tablet, Take 1 tablet by mouth once daily, Disp: 90 tablet, Rfl: 0    ferrous sulfate (FEOSOL) 325 mg (65 mg  iron) Tab tablet, Take 325 mg by mouth daily with breakfast., Disp: , Rfl:     ipratropium-albuteroL (COMBIVENT RESPIMAT)  mcg/actuation inhaler, Inhale 1 puff into the lungs every 6 (six) hours as needed for Wheezing. Rescue, Disp: , Rfl:     metoprolol succinate (TOPROL-XL) 50 MG 24 hr tablet, Take 1 tablet (50 mg total) by mouth every evening., Disp: 90 tablet, Rfl: 3    mirtazapine (REMERON) 7.5 MG Tab, Take 1 tablet (7.5 mg total) by mouth every evening., Disp: 30 tablet, Rfl: 11    nicotine (NICODERM CQ) 21 mg/24 hr, Place 1 patch onto the skin once daily. (Patient not taking: Reported on 10/2/2024), Disp: 28 patch, Rfl: 0    rosuvastatin (CRESTOR) 20 MG tablet, TAKE 1 TABLET BY MOUTH ONCE DAILY IN THE EVENING, Disp: 90 tablet, Rfl: 0    sildenafil (REVATIO) 20 mg Tab, Take 1 tablet (20 mg total) by mouth 3 (three) times daily., Disp: 90 tablet, Rfl: 2        Objective:       Physical Examination:     /64   Pulse 66   Temp 98.7 °F (37.1 °C)   Resp 16   Wt 76.2 kg (167 lb 14.4 oz)   BMI 24.79 kg/m²     Physical Exam  Constitutional:       Appearance: Normal appearance.   HENT:      Head: Normocephalic and atraumatic.   Eyes:      General: No scleral icterus.     Conjunctiva/sclera: Conjunctivae normal.   Cardiovascular:      Rate and Rhythm: Normal rate.   Pulmonary:      Effort: Pulmonary effort is normal.      Comments: On oxygen concentrator.   Abdominal:      General: Abdomen is flat.   Neurological:      General: No focal deficit present.      Mental Status: He is alert and oriented to person, place, and time.   Psychiatric:         Mood and Affect: Mood normal.         Behavior: Behavior normal.         Labs:   No results found for this or any previous visit (from the past 2 weeks).  CMP  Sodium   Date Value Ref Range Status   10/17/2023 140 136 - 145 mmol/L Final     Potassium   Date Value Ref Range Status   10/17/2023 4.2 3.5 - 5.1 mmol/L Final     Chloride   Date Value Ref Range Status  "  10/17/2023 107 95 - 110 mmol/L Final     CO2   Date Value Ref Range Status   10/17/2023 24 23 - 29 mmol/L Final     Glucose   Date Value Ref Range Status   10/17/2023 108 70 - 110 mg/dL Final     BUN   Date Value Ref Range Status   10/17/2023 10 8 - 23 mg/dL Final     Creatinine   Date Value Ref Range Status   10/17/2023 0.8 0.5 - 1.4 mg/dL Final   01/28/2013 0.7 0.5 - 1.4 mg/dL Final     Calcium   Date Value Ref Range Status   10/17/2023 9.8 8.7 - 10.5 mg/dL Final   01/28/2013 9.4 8.7 - 10.5 mg/dL Final     Total Protein   Date Value Ref Range Status   10/17/2023 8.1 6.0 - 8.4 g/dL Final     Albumin   Date Value Ref Range Status   10/17/2023 3.4 (L) 3.5 - 5.2 g/dL Final     Total Bilirubin   Date Value Ref Range Status   10/17/2023 0.6 0.1 - 1.0 mg/dL Final     Comment:     For infants and newborns, interpretation of results should be based  on gestational age, weight and in agreement with clinical  observations.    Premature Infant recommended reference ranges:  Up to 24 hours.............<8.0 mg/dL  Up to 48 hours............<12.0 mg/dL  3-5 days..................<15.0 mg/dL  6-29 days.................<15.0 mg/dL       Alkaline Phosphatase   Date Value Ref Range Status   10/17/2023 117 55 - 135 U/L Final   06/26/2013 112 55 - 135 U/L Final     AST   Date Value Ref Range Status   10/17/2023 17 10 - 40 U/L Final   06/26/2013 11 10 - 40 U/L Final     ALT   Date Value Ref Range Status   10/17/2023 7 (L) 10 - 44 U/L Final     Anion Gap   Date Value Ref Range Status   10/17/2023 9 8 - 16 mmol/L Final   01/28/2013 10 5 - 15 meq/L Final     eGFR if    Date Value Ref Range Status   07/18/2022 >60 >60 mL/min/1.73 m^2 Final     eGFR if non    Date Value Ref Range Status   07/18/2022 >60 >60 mL/min/1.73 m^2 Final     Comment:     Calculation used to obtain the estimated glomerular filtration  rate (eGFR) is the CKD-EPI equation.        No results found for: "CEA"  Lab Results   Component " Value Date    PSA 1.3 10/17/2023    PSA 1.3 06/16/2022    PSA 1.6 04/09/2021           Assessment/Plan:     Problem List Items Addressed This Visit       Lung nodule - Primary     Patient has been seen by radiation oncology and is not a candidate for this nor for surgery.  He is even a poor candidate for biopsy.  Given this, I discussed the upcoming PET scan and will see what this shows.  I would consider empiric chemotherapy, single agent depending on the scan and discussed this today.  I will have him back with me after this is done and discussed all this today.      Lastly,  he is applying for an asbestos claim and needs a CXR.  Will order this now.          Relevant Orders    XR CHEST PA AND LATERAL       Discussion:     Follow up in about 4 weeks (around 12/4/2024).      Electronically signed by Leander Light

## 2024-11-06 NOTE — ASSESSMENT & PLAN NOTE
Patient has been seen by radiation oncology and is not a candidate for this nor for surgery.  He is even a poor candidate for biopsy.  Given this, I discussed the upcoming PET scan and will see what this shows.  I would consider empiric chemotherapy, single agent depending on the scan and discussed this today.  I will have him back with me after this is done and discussed all this today.      Lastly,  he is applying for an asbestos claim and needs a CXR.  Will order this now.

## 2024-11-08 ENCOUNTER — HOSPITAL ENCOUNTER (OUTPATIENT)
Dept: RADIOLOGY | Facility: HOSPITAL | Age: 69
Discharge: HOME | End: 2024-11-08
Attending: INTERNAL MEDICINE
Payer: MEDICARE

## 2024-11-08 DIAGNOSIS — R91.1 LUNG NODULE: ICD-10-CM

## 2024-11-08 PROCEDURE — 71046 X-RAY EXAM CHEST 2 VIEWS: CPT | Mod: TC,PO

## 2024-11-08 PROCEDURE — 71046 X-RAY EXAM CHEST 2 VIEWS: CPT | Mod: 26,,, | Performed by: RADIOLOGY

## 2024-11-11 ENCOUNTER — OFFICE VISIT (OUTPATIENT)
Dept: PULMONOLOGY | Facility: CLINIC | Age: 69
End: 2024-11-11
Payer: MEDICARE

## 2024-11-11 VITALS
OXYGEN SATURATION: 91 % | DIASTOLIC BLOOD PRESSURE: 58 MMHG | HEART RATE: 78 BPM | SYSTOLIC BLOOD PRESSURE: 100 MMHG | WEIGHT: 172.75 LBS | BODY MASS INDEX: 25.51 KG/M2

## 2024-11-11 DIAGNOSIS — J84.10 PULMONARY FIBROSIS: ICD-10-CM

## 2024-11-11 DIAGNOSIS — I27.20 PULMONARY HTN: ICD-10-CM

## 2024-11-11 DIAGNOSIS — J96.11 CHRONIC HYPOXEMIC RESPIRATORY FAILURE: ICD-10-CM

## 2024-11-11 DIAGNOSIS — F17.200 TOBACCO DEPENDENCE: ICD-10-CM

## 2024-11-11 DIAGNOSIS — R91.8 LUNG MASS: Primary | ICD-10-CM

## 2024-11-11 PROBLEM — J43.2 CENTRILOBULAR EMPHYSEMA: Status: RESOLVED | Noted: 2023-10-16 | Resolved: 2024-11-11

## 2024-11-11 PROCEDURE — 1159F MED LIST DOCD IN RCRD: CPT | Mod: CPTII,S$GLB,, | Performed by: INTERNAL MEDICINE

## 2024-11-11 PROCEDURE — 3008F BODY MASS INDEX DOCD: CPT | Mod: CPTII,S$GLB,, | Performed by: INTERNAL MEDICINE

## 2024-11-11 PROCEDURE — 3078F DIAST BP <80 MM HG: CPT | Mod: CPTII,S$GLB,, | Performed by: INTERNAL MEDICINE

## 2024-11-11 PROCEDURE — 1101F PT FALLS ASSESS-DOCD LE1/YR: CPT | Mod: CPTII,S$GLB,, | Performed by: INTERNAL MEDICINE

## 2024-11-11 PROCEDURE — 99999 PR PBB SHADOW E&M-EST. PATIENT-LVL IV: CPT | Mod: PBBFAC,,, | Performed by: INTERNAL MEDICINE

## 2024-11-11 PROCEDURE — 1125F AMNT PAIN NOTED PAIN PRSNT: CPT | Mod: CPTII,S$GLB,, | Performed by: INTERNAL MEDICINE

## 2024-11-11 PROCEDURE — 3288F FALL RISK ASSESSMENT DOCD: CPT | Mod: CPTII,S$GLB,, | Performed by: INTERNAL MEDICINE

## 2024-11-11 PROCEDURE — 3074F SYST BP LT 130 MM HG: CPT | Mod: CPTII,S$GLB,, | Performed by: INTERNAL MEDICINE

## 2024-11-11 PROCEDURE — 99214 OFFICE O/P EST MOD 30 MIN: CPT | Mod: S$GLB,,, | Performed by: INTERNAL MEDICINE

## 2024-11-11 NOTE — PATIENT INSTRUCTIONS
Continue oxygen  Agree with PET scan- at the direction of oncologist  I will be here to support you through the next steps  Please call or come for sooner appointment if I can be helpful for you

## 2024-11-11 NOTE — PROGRESS NOTES
11/11/2024    Srinivasa Oliver  Follow up    Chief Complaint   Patient presents with    Follow-up     6 wk follow-up         HPI:   11/11/2024- pt seen by oncology, felt to be a poor candidate for biopsy, radiation or surgery. Single agent chemo is being considered. He has a PET scan scheduled.  He has cough/white mucous, at baseline. Sob at baseline. He notices neck and bilat shoulder pain about 1 wk. Pain is constant, soreness, worse when turning neck, better with hot shower. He is using POC 6L pulsed flow O2 today. PET scan scheduled wed.    09/23/2024-   Oxygen needed at all times to maintain O2 sats >88%- going without oxygen can put stress on the body and can be life threatening    Please start using 4L oxygen at all times and increase to 6L with activity in order to maintain sats >88%.     You have a large mass on the left lung which is suspected to be a lung cancer  Biopsy of the mass is recommended to get a diagnosis  Oncologist referral placed to see about treatment options  Financial concerns- referral to  to get assistance with your needs  Please call or message office with further questions or concerns    pt here to discuss CT- with large mass, progressive pulm fibrosis. Also found to have worsened O2 reqt. He is here today alone, not wearing O2. Sat 83% on RA today. Discussed with him he has a large mass of the left lung suspected to be a cancer  09/09/2024-   Use nebulizer treatment twice daily with flutter valve to help clear mucous  CT chest to reevaluate lung nodule and pulmonary fibrosis  Need to use oxygen to keep sats >88%    Can further discuss potential pulmonary fibrosis treatment in the future- Ofev- but comes with potential of making diarrhea worse so this may not be a great option for you.    pt here with spouse who assists w/ history.   He was lost to follow up due to dislikes coming to the doctor. In particular when he went to the cancer center in [facility] in past for  bronchoscopy he decided he wouldn't ever want to have any chemo treatment if a cancer was found in his lung.  He notices more coughing beto worse at night, positional.  He spits up mucous which helps him feel better. He takes inhaler before bed which seems to help. Nebs help too- does not use regularly. He wears O2 for a few hours prior to going to sleep, off and on in the daytime.  He does not have O2 with him today and sat is 84% on room air. He finds O2 tank too heavy to carry around while he does things around the house and out of the house. He denies dizziness, HA, passing out, chest pains.  He continues to smoke 1 ppd currently.    09/22/2022-   Airway clearance regimen to help with thick mucous, coughing:   Nebulizer with albuterol twice daily   Use flutter valve twice daily with treatments   Postural drainage at bedtime (handout given) to help drain lower lobes    Pirfenadone (Esbriet) ordered start 1 pill 3 times daily first week, then 2 pills 3 times daily second week, then 3 pills 3 times daily for full dose  Blood testing once monthly to watch liver tests for next 3 months  Quit smoking recommended- nicotine patch ordered  Smoking cessation program ordered      Addendum-   From Dr. Sandra date 9/28/22-   I presented him on lung cancer conference yesterday. Radiologist thought there was a slight decrease in LEIGHTON lesion. XRT doc said that due to ILD, risk of pulmonary toxicity would be higher for empiric radiation and rather not do it. There is a new RLL nodule about 6 mm that needs follow up. In summary we recommend 3  - 4 month follow up CT chest      Addendum-   Per pharmacist Esbriet was denied so I will order Ofev for him.    pt has stable sob, cough w/ mucous. He and wife both think cough/mucous improved with antibiotic. He says phlegm is usually white with once/wk bloody streak.  Sometimes he notices phlegm comes up when lies in a certain position  Pt falls asleep easily, does not snore.  He still  smokes 7-8 cigarettes daily    2022-   Lung scarring - pulmonary fibrosis- is very severe end stage-- suspect due to occupational dust exposure   Treat pneumonia first then will consider med called pirfenadone in future  Continue oxygen, need to wear any time you are being active to keep sats >90%  Take levaquin 7 days for suspected pneumonia in the left upper lobe  Get chest x-ray after completing antibiotics  Repeat CT scan in 2 mos to ensure pneumonia is improving  Repeat PFT 2 months   Treat pneumonia first then will consider antifibrotic med called pirfenadone (Esbriet) in future    Pt is a 65 yo male with CAD s/p CABG, atrial fib, pulmonary fibrosis, 2.8 cm PET avid lung nodule who is new to me and recently established care at our office. Case discussed w/ NP and imaging reviewed. He was sent for navigational bronch, ebus ( per Dr. Sandra) with bx of LEIGHTON nodule and LN biopsies- path negative for malignancy  Fri pt had SOB and heart palpitations  Pt just found out about pulmonary fibrosis however findings present on imaging since   He was just started on home O2, has POC  He smokes 1 ppd- since age 18. He was able to quit using smoking cessation classes in past but started again due to stress w/ wife's health. He has tried chantix twice w/ no success. Also vapes nicotine about 6 months now.  He gets short of breath mowing the lawn.  Coughs w white phlegm, phlegm from nose  Wife assists w/ history. Pt is a poor historian.  Pt was a , , worked in [employer], retired  and had worked 26 yrs at [employer]. 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  Family hx- sister  of lung cancer. No family hx of pulm fibrosis.    2022-   Scheduling lung strength test, this must be done  Will wait on PET scan results. Expect to have tissue biopsy at hospital.   screening Ct by PCP showed diffuse emphysema and  multiple nodules. Scheduled for PET scan.   Compliant of SOB onset years, worse with exertion, improves with rest. On symbicort and albuterol rescue with benefit.   Cough- onset 6 months, worse in mornings, productive clear mucous. Associated with wheeze. Has nocturnal coughing fits.     Social Hx: lives with wife and pet dog, retired from [employer], possible Asbestosis exposure, Smoking Hx; currently smoking 1 pack daily, 43 pack years  Family Hx: no Lung Cancer, no COPD, no Asthma  Medical Hx: no previous pneumonia ; no previous shoulder surgery, CABG surgery 2006      The chief compliant  problem is stable  PFSH:  Past Medical History:   Diagnosis Date    Adenomatous colon polyp 01/28/2011    Adenomatous colon polyp     Anxiety     Atrial fibrillation     Blood transfusion     Centrilobular emphysema 10/16/2023    CHF (congestive heart failure)     Coronary artery disease     Emphysema of lung     Hypertension     Myocardial infarction     Renal disorder     kidney stones    Respiratory failure     S/P coronary artery stent placement 01/07/2013    Staph skin infection     abd/ lanced x 2    Ulcer          Past Surgical History:   Procedure Laterality Date    COLONOSCOPY  01/28/2011    Dr Choudhary, tubular adenoma    CORONARY ARTERY BYPASS GRAFT  2005    CORONARY STENT PLACEMENT      ENDOBRONCHIAL ULTRASOUND N/A 7/18/2022    Procedure: ENDOBRONCHIAL ULTRASOUND (EBUS);  Surgeon: Alli Sandra MD;  Location: [facility];  Service: Pulmonary;  Laterality: N/A;    NAVIGATIONAL BRONCHOSCOPY Bilateral 7/18/2022    Procedure: BRONCHOSCOPY, NAVIGATIONAL;  Surgeon: Alli Sandra MD;  Location: [facility];  Service: Pulmonary;  Laterality: Bilateral;     Social History     Tobacco Use    Smoking status: Every Day     Current packs/day: 1.00     Average packs/day: 0.8 packs/day for 48.4 years (37.9 ttl pk-yrs)     Types: Cigarettes     Start date: 5/31/1976    Smokeless tobacco: Never    Tobacco comments:     Pt currently  smoking 1ppd. Ready to quit, has a patch on while admitted for cravings. Has made multiple attempts to quit and has started again.  Pt is a [employer] Member: [id number]     Pt smokes 1 pack a day 10/02/2024          1 pk qd   Substance Use Topics    Alcohol use: No     Alcohol/week: 0.0 standard drinks of alcohol    Drug use: No     Family History   Problem Relation Name Age of Onset    Heart disease Mother      Heart disease Father      Diabetes Father      Heart disease Maternal Aunt      No Known Problems Sister      No Known Problems Brother      No Known Problems Daughter      No Known Problems Son       Review of patient's allergies indicates:   Allergen Reactions    Atorvastatin      Other reaction(s): Rash     I have reviewed past medical, family, and social history. I have reviewed previous nurse notes.    Performance Status:The patient's activity level is functions out of house.      Review of Systems:  a review of eleven systems covering constitutional, Eye, HEENT, Psych, Respiratory, Cardiac, GI, , Musculoskeletal, Endocrine, Dermatologic was negative except for pertinent findings as listed ABOVE and below:  All negative with pertinent positives as above       Exam:Comprehensive exam done. BP (!) 100/58   Pulse 78   Wt 78.4 kg (172 lb 11.7 oz)   SpO2 (!) 91% Comment: room air at rest  BMI 25.51 kg/m²   Exam included Vitals as listed, and patient's appearance and affect and alertness and mood, oral exam for yeast and hygiene and pharynx lesions and Mallapatti (M) score, neck with inspection for jvd and masses and thyroid abnormalities and lymph nodes (supraclavicular and infraclavicular nodes and axillary also examined and noted if abn), chest exam included symmetry and effort and fremitus and percussion and auscultation, cardiac exam included rhythm and gallops and murmur and rubs and jvd and edema, abdominal exam for mass and hepatosplenomegaly and tenderness and hernias and bowel sounds,  Musculoskeletal exam with muscle tone and posture and mobility/gait and  strength, and skin for rashes and cyanosis and pallor and turgor, extremity for clubbing.  Findings were normal except for pertinent findings listed below:  M4, edentulous  HR regular  Breath sounds with scattered rales and crackles, diminished L lower lung  Significant clubbing bilat nails  No edema    Radiographs (ct chest and cxr) reviewed: view by direct vision  CXR 11/8/24- LEIGHTON mass persists and growing larger    CT chest 9/17/24- LEIGHTON mass with cavitation increasing in size suspected to be a malignancy    CXR 8/2023- worsened bilat pulmonary fibrosis with possible superimposed acute process on left    CT chest 9/21/22- LEIGHTON consolidation stable past 3 scans and appears somewhat improved compared to 6/2022 scan. Fibrosis overall stable  CT chest 8/22/22- stable    PET CT 7/7/22-  LEIGHTON PET avid nodular consolidation appears similar size to previous, also with PET avid mediastinal adenopathy  1. Multiple contiguous hypermetabolic pleural based pulmonary opacities in the left upper lobe of the lung, nonspecific. Given appearance and morphology, these could be of infectious or inflammatory etiology. Consider short-term chest CT follow-up in 3 months following antibiotic therapy. Alternatively, these could be biopsied for tissue diagnosis however this patient is a high risk for pneumothorax with CT-guided lung biopsy, given underlying parenchymal lung disease and emphysema.  2. No additional findings of FDG avid primary neoplasm or metastatic disease.  3. Several prominent to mildly enlarged mediastinal lymph nodes which are very mildly hypermetabolic, and stable compared to multiple prior CT exams, most likely incidental and or reactive.  4. Additional observations as described.    CT Chest Lung Screening Low Dose  06/20/2022 - there is very severe pulmonary fibrosis features include peripheral predominant, even distribution in the upper and  lower lobes w/ honeycombing, bronchiectasis and reticulations.  LEIGHTON pleural based nodular consolidation without distrete borders. Fibrosis is ?slightly progressed compared to prior CT 4/2021 but definitely progressive since 2020    CT chest 2009- lower lobe ground glass, milder ILD w/ reticulations and peripheral honeycombing (beto upper lobes)     TTE 8/9/23-     Left Ventricle: The left ventricle is normal in size. Normal wall thickness. Normal wall motion. There is normal systolic function. Ejection fraction by visual approximation is 60%. There is normal diastolic function.    Left Atrium: Left atrium is moderately dilated. Left atrium elongated due to cardiac transplantation.    Right Ventricle: Moderate right ventricular enlargement. Wall thickness is normal. Right ventricle wall motion  is normal. Systolic function is normal.    Right Atrium: Right atrium is moderately dilated.    Mitral Valve: Mild mitral annular calcification.    Tricuspid Valve: There is moderate regurgitation. There is moderate pulmonary hypertension.    IVC/SVC: Normal venous pressure at 3 mmHg.    Labs reviewed         Lab Results   Component Value Date    WBC 7.09 10/17/2023    HGB 15.7 10/17/2023    HCT 48.6 10/17/2023     (H) 10/17/2023     10/17/2023       CMP  Sodium   Date Value Ref Range Status   10/17/2023 140 136 - 145 mmol/L Final     Potassium   Date Value Ref Range Status   10/17/2023 4.2 3.5 - 5.1 mmol/L Final     Chloride   Date Value Ref Range Status   10/17/2023 107 95 - 110 mmol/L Final     CO2   Date Value Ref Range Status   10/17/2023 24 23 - 29 mmol/L Final     Glucose   Date Value Ref Range Status   10/17/2023 108 70 - 110 mg/dL Final     BUN   Date Value Ref Range Status   10/17/2023 10 8 - 23 mg/dL Final     Creatinine   Date Value Ref Range Status   10/17/2023 0.8 0.5 - 1.4 mg/dL Final   01/28/2013 0.7 0.5 - 1.4 mg/dL Final     Calcium   Date Value Ref Range Status   10/17/2023 9.8 8.7 - 10.5 mg/dL  "Final   01/28/2013 9.4 8.7 - 10.5 mg/dL Final     Total Protein   Date Value Ref Range Status   10/17/2023 8.1 6.0 - 8.4 g/dL Final     Albumin   Date Value Ref Range Status   10/17/2023 3.4 (L) 3.5 - 5.2 g/dL Final     Total Bilirubin   Date Value Ref Range Status   10/17/2023 0.6 0.1 - 1.0 mg/dL Final     Comment:     For infants and newborns, interpretation of results should be based  on gestational age, weight and in agreement with clinical  observations.    Premature Infant recommended reference ranges:  Up to 24 hours.............<8.0 mg/dL  Up to 48 hours............<12.0 mg/dL  3-5 days..................<15.0 mg/dL  6-29 days.................<15.0 mg/dL       Alkaline Phosphatase   Date Value Ref Range Status   10/17/2023 117 55 - 135 U/L Final   06/26/2013 112 55 - 135 U/L Final     AST   Date Value Ref Range Status   10/17/2023 17 10 - 40 U/L Final   06/26/2013 11 10 - 40 U/L Final     ALT   Date Value Ref Range Status   10/17/2023 7 (L) 10 - 44 U/L Final     Anion Gap   Date Value Ref Range Status   10/17/2023 9 8 - 16 mmol/L Final   01/28/2013 10 5 - 15 meq/L Final     eGFR   Date Value Ref Range Status   10/17/2023 >60.0 >60 mL/min/1.73 m^2 Final         PFT reviewed-   9/21/22- slightly improved restriction, stable dlco    6/24/22- restriction, severe reduction in dlco        6mwt 6/24/22- 255m, O2 reqt 3-4L  6mwt 9/17/24- 170m; min sat 79% on room air at rest; req 4-6L oxygen    Plan:  Clinical impression is apparently straight forward and impression with management as below. Very advanced pulm fibrosis, suspect related to occupational dust exposure. O2 dependent  Lung mass presumed malignancy- seeing oncology. Poor candidate for lung biopsy or radiation- pending PET scan to further determine options        Srinivasa Polanco" was seen today for follow-up.    Diagnoses and all orders for this visit:    Lung mass    Pulmonary fibrosis    Chronic hypoxemic respiratory failure    Tobacco " dependence    Pulmonary HTN                Follow up in about 2 months (around 1/11/2025).    Discussed with patient above for education the following:      Patient Instructions   Continue oxygen  Agree with PET scan- at the direction of oncologist  I will be here to support you through the next steps  Please call or come for sooner appointment if I can be helpful for you

## 2024-11-13 ENCOUNTER — HOSPITAL ENCOUNTER (OUTPATIENT)
Dept: RADIOLOGY | Facility: HOSPITAL | Age: 69
Discharge: HOME | End: 2024-11-13
Attending: INTERNAL MEDICINE
Payer: MEDICARE

## 2024-11-13 VITALS — WEIGHT: 170 LBS | HEIGHT: 69 IN | BODY MASS INDEX: 25.18 KG/M2

## 2024-11-13 DIAGNOSIS — R91.8 LUNG MASS: ICD-10-CM

## 2024-11-13 LAB — POCT GLUCOSE: 109 MG/DL (ref 70–110)

## 2024-11-13 PROCEDURE — 82962 GLUCOSE BLOOD TEST: CPT | Mod: PO

## 2024-11-13 PROCEDURE — 78815 PET IMAGE W/CT SKULL-THIGH: CPT | Mod: 26,PS,, | Performed by: RADIOLOGY

## 2024-11-13 PROCEDURE — A9552 F18 FDG: HCPCS | Mod: PO | Performed by: INTERNAL MEDICINE

## 2024-11-13 RX ORDER — FLUDEOXYGLUCOSE F18 500 MCI/ML
13 INJECTION INTRAVENOUS
Status: COMPLETED | OUTPATIENT
Start: 2024-11-13 | End: 2024-11-13

## 2024-11-13 RX ADMIN — FLUDEOXYGLUCOSE F-18 13 MILLICURIE: 500 INJECTION INTRAVENOUS at 08:11

## 2024-11-14 ENCOUNTER — OFFICE VISIT (OUTPATIENT)
Facility: CLINIC | Age: 69
End: 2024-11-14
Payer: MEDICARE

## 2024-11-14 VITALS
DIASTOLIC BLOOD PRESSURE: 71 MMHG | SYSTOLIC BLOOD PRESSURE: 128 MMHG | TEMPERATURE: 98 F | BODY MASS INDEX: 24.96 KG/M2 | HEART RATE: 68 BPM | WEIGHT: 169 LBS | RESPIRATION RATE: 18 BRPM

## 2024-11-14 DIAGNOSIS — C77.4 MALIGNANT NEOPLASM METASTATIC TO LYMPH NODE OF LOWER EXTREMITY: ICD-10-CM

## 2024-11-14 DIAGNOSIS — G89.3 CANCER ASSOCIATED PAIN: ICD-10-CM

## 2024-11-14 DIAGNOSIS — C34.12 MALIGNANT NEOPLASM OF UPPER LOBE OF LEFT LUNG: Primary | ICD-10-CM

## 2024-11-14 PROBLEM — C77.9 METASTASIS TO LYMPH NODES: Status: ACTIVE | Noted: 2024-11-14

## 2024-11-14 PROCEDURE — 1125F AMNT PAIN NOTED PAIN PRSNT: CPT | Mod: CPTII,S$GLB,, | Performed by: INTERNAL MEDICINE

## 2024-11-14 PROCEDURE — 3078F DIAST BP <80 MM HG: CPT | Mod: CPTII,S$GLB,, | Performed by: INTERNAL MEDICINE

## 2024-11-14 PROCEDURE — 1159F MED LIST DOCD IN RCRD: CPT | Mod: CPTII,S$GLB,, | Performed by: INTERNAL MEDICINE

## 2024-11-14 PROCEDURE — 99999 PR PBB SHADOW E&M-EST. PATIENT-LVL III: CPT | Mod: PBBFAC,,, | Performed by: INTERNAL MEDICINE

## 2024-11-14 PROCEDURE — 3288F FALL RISK ASSESSMENT DOCD: CPT | Mod: CPTII,S$GLB,, | Performed by: INTERNAL MEDICINE

## 2024-11-14 PROCEDURE — G2211 COMPLEX E/M VISIT ADD ON: HCPCS | Mod: S$GLB,,, | Performed by: INTERNAL MEDICINE

## 2024-11-14 PROCEDURE — 3074F SYST BP LT 130 MM HG: CPT | Mod: CPTII,S$GLB,, | Performed by: INTERNAL MEDICINE

## 2024-11-14 PROCEDURE — 1101F PT FALLS ASSESS-DOCD LE1/YR: CPT | Mod: CPTII,S$GLB,, | Performed by: INTERNAL MEDICINE

## 2024-11-14 PROCEDURE — 3008F BODY MASS INDEX DOCD: CPT | Mod: CPTII,S$GLB,, | Performed by: INTERNAL MEDICINE

## 2024-11-14 PROCEDURE — 99215 OFFICE O/P EST HI 40 MIN: CPT | Mod: S$GLB,,, | Performed by: INTERNAL MEDICINE

## 2024-11-14 RX ORDER — HYDROCODONE BITARTRATE AND ACETAMINOPHEN 5; 325 MG/1; MG/1
1 TABLET ORAL EVERY 6 HOURS PRN
Qty: 40 TABLET | Refills: 0 | Status: SHIPPED | OUTPATIENT
Start: 2024-11-14

## 2024-11-14 NOTE — ASSESSMENT & PLAN NOTE
PET scan reviewed with patient and he appears to have further disease in the mediastinum, possibly pancreas and a lesion in the thigh.  I discussed the situation and my opinion that he has an incurable disease and that any therapy would be geared towards palliation.  I'm not sure what therapy he could tolerate but would try to come up with something but would need a biopsy.  I think that if he does not want therapy however, he does not need a biopsy.  He wishes to think about this and I have messaged IR to see if the thigh lesion can be biopsied.  Will discuss further if this is possible.  In my opinion this is metastatic lung cancer and will diagnose as such.  Will call patient if biopsy possible.      He is also having cancer pain and will prescribe norco for this.

## 2024-11-14 NOTE — PROGRESS NOTES
PROGRESS NOTE    Subjective:       Patient ID: Srinivasa Oliver is a 68 y.o. male.    9/17/2024-CT Chest:  7.2 x 5.4cm mass-like opacification in the LEIGHTON.  Left MS/paratracheal node    No biopsy possible due to safety issues  No radiation possible due to pulmonary disease.     11/13/2024-PET:  1.  New FDG avid pulmonary mass in the juxtapleural left upper lobe compatible with malignancy. 4.9cm     2.  Additional cavitary masslike opacity and consolidation in the lingula suggesting a component of malignancy, infection/pneumonia and or atelectasis. 5.3cm     3.  FDG avid mediastinal/hilar lymphadenopathy in keeping with additional sites of disease. 4.8cm(largest)     4.  FDG avid focus along the inferior margin of the pancreatic head suspicious for malignancy/metastasis, consider further characterization with dedicated imaging.     5.  Soft tissue nodule projecting the musculature of the proximal thigh, with marked increased FDG activity from background suspicious for metastasis.      Chief Complaint:  No chief complaint on file.  LEIGHTON pulmonary mass c/w lung cancer-no biopsy follow up    Severe pulmonary fibrosis    Not a biopsy/surgical or radiation candidate.     History of Present Illness:   Srinivasa Oliver is a 68 y.o. male who presents for follow up of lung tumor.     No new complaints. Remains on oxygen concentrator.   Feeling ok             Current Outpatient Medications:     albuterol (PROVENTIL HFA) 90 mcg/actuation inhaler, Inhale 2 puffs into the lungs every 4 (four) hours as needed for Wheezing. Every 4-6 hours PRN, Disp: 18 g, Rfl: 11    ascorbic acid, vitamin C, (VITAMIN C) 1000 MG tablet, Take 1,000 mg by mouth once daily., Disp: , Rfl:     aspirin (ECOTRIN) 81 MG EC tablet, Take 81 mg by mouth once daily., Disp: , Rfl:     buPROPion (WELLBUTRIN SR) 150 MG TBSR 12 hr tablet, Take 1 tablet once a day for 7 days then increase to twice a day  (Patient not taking: Reported on 8/21/2024), Disp: 60 tablet, Rfl: 0    ciclopirox (PENLAC) 8 % Soln, Apply topically nightly. Paint on affected toenails and surrounding skin, Disp: 6.6 mL, Rfl: 2    clopidogreL (PLAVIX) 75 mg tablet, TAKE 1 TABLET BY MOUTH ONCE DAILY . APPOINTMENT REQUIRED FOR FUTURE REFILLS, Disp: 90 tablet, Rfl: 0    coenzyme Q10-vitamin E 100-100 mg-unit Cap, Take 200 mg by mouth once daily. Every day, Disp: , Rfl:     DOCOSAHEXANOIC ACID/EPA (FISH OIL ORAL), Take 1 capsule by mouth 2 (two) times daily as needed. Twice a day, Disp: , Rfl:     EScitalopram oxalate (LEXAPRO) 10 MG tablet, Take 1 tablet by mouth once daily, Disp: 90 tablet, Rfl: 0    ferrous sulfate (FEOSOL) 325 mg (65 mg iron) Tab tablet, Take 325 mg by mouth daily with breakfast., Disp: , Rfl:     HYDROcodone-acetaminophen (NORCO) 5-325 mg per tablet, Take 1 tablet by mouth every 6 (six) hours as needed for Pain., Disp: 40 tablet, Rfl: 0    ipratropium-albuteroL (COMBIVENT RESPIMAT)  mcg/actuation inhaler, Inhale 1 puff into the lungs every 6 (six) hours as needed for Wheezing. Rescue, Disp: , Rfl:     metoprolol succinate (TOPROL-XL) 50 MG 24 hr tablet, Take 1 tablet (50 mg total) by mouth every evening., Disp: 90 tablet, Rfl: 3    mirtazapine (REMERON) 7.5 MG Tab, Take 1 tablet (7.5 mg total) by mouth every evening., Disp: 30 tablet, Rfl: 11    nicotine (NICODERM CQ) 21 mg/24 hr, Place 1 patch onto the skin once daily., Disp: 28 patch, Rfl: 0    rosuvastatin (CRESTOR) 20 MG tablet, TAKE 1 TABLET BY MOUTH ONCE DAILY IN THE EVENING, Disp: 90 tablet, Rfl: 0    sildenafil (REVATIO) 20 mg Tab, Take 1 tablet (20 mg total) by mouth 3 (three) times daily., Disp: 90 tablet, Rfl: 2        Objective:       Physical Examination:     /71   Pulse 68   Temp 98.3 °F (36.8 °C)   Resp 18   Wt 76.7 kg (169 lb)   BMI 24.96 kg/m²     Physical Exam  Constitutional:       Appearance: Normal appearance.   HENT:      Head:  Normocephalic and atraumatic.   Eyes:      General: No scleral icterus.     Conjunctiva/sclera: Conjunctivae normal.   Cardiovascular:      Rate and Rhythm: Normal rate.   Pulmonary:      Effort: Pulmonary effort is normal.      Comments: On oxygen concentrator.   Abdominal:      General: Abdomen is flat.   Neurological:      General: No focal deficit present.      Mental Status: He is alert and oriented to person, place, and time.   Psychiatric:         Mood and Affect: Mood normal.         Behavior: Behavior normal.         Labs:   No results found for this or any previous visit (from the past 2 weeks).  CMP  Sodium   Date Value Ref Range Status   10/17/2023 140 136 - 145 mmol/L Final     Potassium   Date Value Ref Range Status   10/17/2023 4.2 3.5 - 5.1 mmol/L Final     Chloride   Date Value Ref Range Status   10/17/2023 107 95 - 110 mmol/L Final     CO2   Date Value Ref Range Status   10/17/2023 24 23 - 29 mmol/L Final     Glucose   Date Value Ref Range Status   10/17/2023 108 70 - 110 mg/dL Final     BUN   Date Value Ref Range Status   10/17/2023 10 8 - 23 mg/dL Final     Creatinine   Date Value Ref Range Status   10/17/2023 0.8 0.5 - 1.4 mg/dL Final   01/28/2013 0.7 0.5 - 1.4 mg/dL Final     Calcium   Date Value Ref Range Status   10/17/2023 9.8 8.7 - 10.5 mg/dL Final   01/28/2013 9.4 8.7 - 10.5 mg/dL Final     Total Protein   Date Value Ref Range Status   10/17/2023 8.1 6.0 - 8.4 g/dL Final     Albumin   Date Value Ref Range Status   10/17/2023 3.4 (L) 3.5 - 5.2 g/dL Final     Total Bilirubin   Date Value Ref Range Status   10/17/2023 0.6 0.1 - 1.0 mg/dL Final     Comment:     For infants and newborns, interpretation of results should be based  on gestational age, weight and in agreement with clinical  observations.    Premature Infant recommended reference ranges:  Up to 24 hours.............<8.0 mg/dL  Up to 48 hours............<12.0 mg/dL  3-5 days..................<15.0 mg/dL  6-29  "days.................<15.0 mg/dL       Alkaline Phosphatase   Date Value Ref Range Status   10/17/2023 117 55 - 135 U/L Final   06/26/2013 112 55 - 135 U/L Final     AST   Date Value Ref Range Status   10/17/2023 17 10 - 40 U/L Final   06/26/2013 11 10 - 40 U/L Final     ALT   Date Value Ref Range Status   10/17/2023 7 (L) 10 - 44 U/L Final     Anion Gap   Date Value Ref Range Status   10/17/2023 9 8 - 16 mmol/L Final   01/28/2013 10 5 - 15 meq/L Final     eGFR if    Date Value Ref Range Status   07/18/2022 >60 >60 mL/min/1.73 m^2 Final     eGFR if non    Date Value Ref Range Status   07/18/2022 >60 >60 mL/min/1.73 m^2 Final     Comment:     Calculation used to obtain the estimated glomerular filtration  rate (eGFR) is the CKD-EPI equation.        No results found for: "CEA"  Lab Results   Component Value Date    PSA 1.3 10/17/2023    PSA 1.3 06/16/2022    PSA 1.6 04/09/2021           Assessment/Plan:     Problem List Items Addressed This Visit       Malignant neoplasm of upper lobe of left lung - Primary     PET scan reviewed with patient and he appears to have further disease in the mediastinum, possibly pancreas and a lesion in the thigh.  I discussed the situation and my opinion that he has an incurable disease and that any therapy would be geared towards palliation.  I'm not sure what therapy he could tolerate but would try to come up with something but would need a biopsy.  I think that if he does not want therapy however, he does not need a biopsy.  He wishes to think about this and I have messaged IR to see if the thigh lesion can be biopsied.  Will discuss further if this is possible.  In my opinion this is metastatic lung cancer and will diagnose as such.  Will call patient if biopsy possible.      He is also having cancer pain and will prescribe norco for this.           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      Electronically signed by Leander Light

## 2024-11-19 ENCOUNTER — TELEPHONE (OUTPATIENT)
Dept: PULMONOLOGY | Facility: CLINIC | Age: 69
End: 2024-11-19
Payer: MEDICARE

## 2024-12-04 ENCOUNTER — TELEPHONE (OUTPATIENT)
Facility: CLINIC | Age: 69
End: 2024-12-04
Payer: MEDICARE

## 2024-12-04 NOTE — TELEPHONE ENCOUNTER
Attempted to leave a voicemail to remind patient of upcoming appointment 12/11/24. Unable to leave voicemail.

## 2024-12-11 ENCOUNTER — OFFICE VISIT (OUTPATIENT)
Facility: CLINIC | Age: 69
End: 2024-12-11
Payer: MEDICARE

## 2024-12-11 ENCOUNTER — TELEPHONE (OUTPATIENT)
Facility: CLINIC | Age: 69
End: 2024-12-11

## 2024-12-11 VITALS
WEIGHT: 171 LBS | RESPIRATION RATE: 17 BRPM | BODY MASS INDEX: 25.25 KG/M2 | HEART RATE: 71 BPM | SYSTOLIC BLOOD PRESSURE: 111 MMHG | TEMPERATURE: 99 F | DIASTOLIC BLOOD PRESSURE: 57 MMHG

## 2024-12-11 DIAGNOSIS — G89.3 CANCER ASSOCIATED PAIN: ICD-10-CM

## 2024-12-11 DIAGNOSIS — C77.4 MALIGNANT NEOPLASM METASTATIC TO LYMPH NODE OF LOWER EXTREMITY: ICD-10-CM

## 2024-12-11 DIAGNOSIS — C34.12 MALIGNANT NEOPLASM OF UPPER LOBE OF LEFT LUNG: Primary | ICD-10-CM

## 2024-12-11 PROCEDURE — 99999 PR PBB SHADOW E&M-EST. PATIENT-LVL III: CPT | Mod: PBBFAC,HCNC,, | Performed by: INTERNAL MEDICINE

## 2024-12-11 PROCEDURE — 1101F PT FALLS ASSESS-DOCD LE1/YR: CPT | Mod: HCNC,CPTII,S$GLB, | Performed by: INTERNAL MEDICINE

## 2024-12-11 PROCEDURE — 3074F SYST BP LT 130 MM HG: CPT | Mod: HCNC,CPTII,S$GLB, | Performed by: INTERNAL MEDICINE

## 2024-12-11 PROCEDURE — 99215 OFFICE O/P EST HI 40 MIN: CPT | Mod: HCNC,S$GLB,, | Performed by: INTERNAL MEDICINE

## 2024-12-11 PROCEDURE — 3008F BODY MASS INDEX DOCD: CPT | Mod: HCNC,CPTII,S$GLB, | Performed by: INTERNAL MEDICINE

## 2024-12-11 PROCEDURE — G2211 COMPLEX E/M VISIT ADD ON: HCPCS | Mod: HCNC,S$GLB,, | Performed by: INTERNAL MEDICINE

## 2024-12-11 PROCEDURE — 1125F AMNT PAIN NOTED PAIN PRSNT: CPT | Mod: HCNC,CPTII,S$GLB, | Performed by: INTERNAL MEDICINE

## 2024-12-11 PROCEDURE — 3288F FALL RISK ASSESSMENT DOCD: CPT | Mod: HCNC,CPTII,S$GLB, | Performed by: INTERNAL MEDICINE

## 2024-12-11 PROCEDURE — 1159F MED LIST DOCD IN RCRD: CPT | Mod: HCNC,CPTII,S$GLB, | Performed by: INTERNAL MEDICINE

## 2024-12-11 PROCEDURE — 3078F DIAST BP <80 MM HG: CPT | Mod: HCNC,CPTII,S$GLB, | Performed by: INTERNAL MEDICINE

## 2024-12-11 RX ORDER — HYDROCODONE BITARTRATE AND ACETAMINOPHEN 5; 325 MG/1; MG/1
1 TABLET ORAL EVERY 6 HOURS PRN
Qty: 40 TABLET | Refills: 0 | Status: SHIPPED | OUTPATIENT
Start: 2024-12-11

## 2024-12-11 NOTE — ASSESSMENT & PLAN NOTE
I had a long discussion with patient and wife today.  I really see no good treatment options for him from a chemo or immunotherapy standpoint.  He is also not a surgical or radiation candidate.  I discussed comfort care and hospice with him today and outlined what they provide.  After this discussion he is ok with proceeding in this direction.  Will make referral and can see patient as needed from this point out.

## 2024-12-11 NOTE — PROGRESS NOTES
PROGRESS NOTE    Subjective:       Patient ID: Srinivasa Oliver is a 69 y.o. male.    9/17/2024-CT Chest:  7.2 x 5.4cm mass-like opacification in the LEIGHTON.  Left MS/paratracheal node    No biopsy possible due to safety issues  No radiation possible due to pulmonary disease.     11/13/2024-PET:  1.  New FDG avid pulmonary mass in the juxtapleural left upper lobe compatible with malignancy. 4.9cm     2.  Additional cavitary masslike opacity and consolidation in the lingula suggesting a component of malignancy, infection/pneumonia and or atelectasis. 5.3cm     3.  FDG avid mediastinal/hilar lymphadenopathy in keeping with additional sites of disease. 4.8cm(largest)     4.  FDG avid focus along the inferior margin of the pancreatic head suspicious for malignancy/metastasis, consider further characterization with dedicated imaging.     5.  Soft tissue nodule projecting the musculature of the proximal thigh, with marked increased FDG activity from background suspicious for metastasis.      Chief Complaint:  No chief complaint on file.  LEIGHTON pulmonary mass c/w lung cancer-no biopsy follow up    Severe pulmonary fibrosis    Not a biopsy/surgical or radiation candidate.     History of Present Illness:   Srinivasa Oliver is a 69 y.o. male who presents for follow up of lung tumor.     No new complaints. Remains on oxygen concentrator.   Feeling ok             Current Outpatient Medications:     albuterol (PROVENTIL HFA) 90 mcg/actuation inhaler, Inhale 2 puffs into the lungs every 4 (four) hours as needed for Wheezing. Every 4-6 hours PRN, Disp: 18 g, Rfl: 11    ascorbic acid, vitamin C, (VITAMIN C) 1000 MG tablet, Take 1,000 mg by mouth once daily., Disp: , Rfl:     aspirin (ECOTRIN) 81 MG EC tablet, Take 81 mg by mouth once daily., Disp: , Rfl:     buPROPion (WELLBUTRIN SR) 150 MG TBSR 12 hr tablet, Take 1 tablet once a day for 7 days then increase to twice a day  (Patient not taking: Reported on 8/21/2024), Disp: 60 tablet, Rfl: 0    ciclopirox (PENLAC) 8 % Soln, Apply topically nightly. Paint on affected toenails and surrounding skin, Disp: 6.6 mL, Rfl: 2    clopidogreL (PLAVIX) 75 mg tablet, TAKE 1 TABLET BY MOUTH ONCE DAILY . APPOINTMENT REQUIRED FOR FUTURE REFILLS, Disp: 90 tablet, Rfl: 0    coenzyme Q10-vitamin E 100-100 mg-unit Cap, Take 200 mg by mouth once daily. Every day, Disp: , Rfl:     DOCOSAHEXANOIC ACID/EPA (FISH OIL ORAL), Take 1 capsule by mouth 2 (two) times daily as needed. Twice a day, Disp: , Rfl:     EScitalopram oxalate (LEXAPRO) 10 MG tablet, Take 1 tablet by mouth once daily, Disp: 90 tablet, Rfl: 0    ferrous sulfate (FEOSOL) 325 mg (65 mg iron) Tab tablet, Take 325 mg by mouth daily with breakfast., Disp: , Rfl:     HYDROcodone-acetaminophen (NORCO) 5-325 mg per tablet, Take 1 tablet by mouth every 6 (six) hours as needed for Pain., Disp: 40 tablet, Rfl: 0    ipratropium-albuteroL (COMBIVENT RESPIMAT)  mcg/actuation inhaler, Inhale 1 puff into the lungs every 6 (six) hours as needed for Wheezing. Rescue, Disp: , Rfl:     metoprolol succinate (TOPROL-XL) 50 MG 24 hr tablet, Take 1 tablet (50 mg total) by mouth every evening., Disp: 90 tablet, Rfl: 3    mirtazapine (REMERON) 7.5 MG Tab, Take 1 tablet (7.5 mg total) by mouth every evening., Disp: 30 tablet, Rfl: 11    nicotine (NICODERM CQ) 21 mg/24 hr, Place 1 patch onto the skin once daily., Disp: 28 patch, Rfl: 0    rosuvastatin (CRESTOR) 20 MG tablet, TAKE 1 TABLET BY MOUTH ONCE DAILY IN THE EVENING, Disp: 90 tablet, Rfl: 0    sildenafil (REVATIO) 20 mg Tab, Take 1 tablet (20 mg total) by mouth 3 (three) times daily., Disp: 90 tablet, Rfl: 2        Objective:       Physical Examination:     BP (!) 111/57   Pulse 71   Temp 98.6 °F (37 °C)   Resp 17   Wt 77.6 kg (171 lb)   BMI 25.25 kg/m²     Physical Exam  Constitutional:       Appearance: Normal appearance.   HENT:      Head:  Normocephalic and atraumatic.   Eyes:      General: No scleral icterus.     Conjunctiva/sclera: Conjunctivae normal.   Cardiovascular:      Rate and Rhythm: Normal rate.   Pulmonary:      Effort: Pulmonary effort is normal.      Comments: On oxygen concentrator.   Abdominal:      General: Abdomen is flat.   Neurological:      General: No focal deficit present.      Mental Status: He is alert and oriented to person, place, and time.   Psychiatric:         Mood and Affect: Mood normal.         Behavior: Behavior normal.         Labs:   No results found for this or any previous visit (from the past 2 weeks).  CMP  Sodium   Date Value Ref Range Status   10/17/2023 140 136 - 145 mmol/L Final     Potassium   Date Value Ref Range Status   10/17/2023 4.2 3.5 - 5.1 mmol/L Final     Chloride   Date Value Ref Range Status   10/17/2023 107 95 - 110 mmol/L Final     CO2   Date Value Ref Range Status   10/17/2023 24 23 - 29 mmol/L Final     Glucose   Date Value Ref Range Status   10/17/2023 108 70 - 110 mg/dL Final     BUN   Date Value Ref Range Status   10/17/2023 10 8 - 23 mg/dL Final     Creatinine   Date Value Ref Range Status   10/17/2023 0.8 0.5 - 1.4 mg/dL Final   01/28/2013 0.7 0.5 - 1.4 mg/dL Final     Calcium   Date Value Ref Range Status   10/17/2023 9.8 8.7 - 10.5 mg/dL Final   01/28/2013 9.4 8.7 - 10.5 mg/dL Final     Total Protein   Date Value Ref Range Status   10/17/2023 8.1 6.0 - 8.4 g/dL Final     Albumin   Date Value Ref Range Status   10/17/2023 3.4 (L) 3.5 - 5.2 g/dL Final     Total Bilirubin   Date Value Ref Range Status   10/17/2023 0.6 0.1 - 1.0 mg/dL Final     Comment:     For infants and newborns, interpretation of results should be based  on gestational age, weight and in agreement with clinical  observations.    Premature Infant recommended reference ranges:  Up to 24 hours.............<8.0 mg/dL  Up to 48 hours............<12.0 mg/dL  3-5 days..................<15.0 mg/dL  6-29  "days.................<15.0 mg/dL       Alkaline Phosphatase   Date Value Ref Range Status   10/17/2023 117 55 - 135 U/L Final   06/26/2013 112 55 - 135 U/L Final     AST   Date Value Ref Range Status   10/17/2023 17 10 - 40 U/L Final   06/26/2013 11 10 - 40 U/L Final     ALT   Date Value Ref Range Status   10/17/2023 7 (L) 10 - 44 U/L Final     Anion Gap   Date Value Ref Range Status   10/17/2023 9 8 - 16 mmol/L Final   01/28/2013 10 5 - 15 meq/L Final     eGFR if    Date Value Ref Range Status   07/18/2022 >60 >60 mL/min/1.73 m^2 Final     eGFR if non    Date Value Ref Range Status   07/18/2022 >60 >60 mL/min/1.73 m^2 Final     Comment:     Calculation used to obtain the estimated glomerular filtration  rate (eGFR) is the CKD-EPI equation.        No results found for: "CEA"  Lab Results   Component Value Date    PSA 1.3 10/17/2023    PSA 1.3 06/16/2022    PSA 1.6 04/09/2021           Assessment/Plan:     Problem List Items Addressed This Visit       Malignant neoplasm of upper lobe of left lung - Primary     I had a long discussion with patient and wife today.  I really see no good treatment options for him from a chemo or immunotherapy standpoint.  He is also not a surgical or radiation candidate.  I discussed comfort care and hospice with him today and outlined what they provide.  After this discussion he is ok with proceeding in this direction.  Will make referral and can see patient as needed from this point out.          Relevant Medications    HYDROcodone-acetaminophen (NORCO) 5-325 mg per tablet    [Metastasis to lymph nodes]    Cancer associated pain    Relevant Medications    HYDROcodone-acetaminophen (NORCO) 5-325 mg per tablet           Discussion:     Follow up if symptoms worsen or fail to improve.      Electronically signed by Leander Light      "

## 2025-02-21 DIAGNOSIS — Z00.00 ENCOUNTER FOR MEDICARE ANNUAL WELLNESS EXAM: ICD-10-CM

## 2025-04-04 ENCOUNTER — HOSPITAL ENCOUNTER (EMERGENCY)
Facility: HOSPITAL | Age: 70
Discharge: HOME | End: 2025-04-04
Attending: EMERGENCY MEDICINE
Payer: MEDICARE

## 2025-04-04 VITALS
TEMPERATURE: 98 F | WEIGHT: 171 LBS | HEART RATE: 89 BPM | RESPIRATION RATE: 24 BRPM | OXYGEN SATURATION: 87 % | SYSTOLIC BLOOD PRESSURE: 116 MMHG | BODY MASS INDEX: 25.25 KG/M2 | DIASTOLIC BLOOD PRESSURE: 65 MMHG
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
## 2025-04-04 PROCEDURE — 99283 EMERGENCY DEPT VISIT LOW MDM: CPT

## 2025-04-04 NOTE — ED PROVIDER NOTES
Encounter Date: 4/4/2025       History     Chief Complaint   Patient presents with    Fall     States he lost his footing in bathroom while bathing, denies hitting head or LOC, on hospice for lung cancer     69-year-old male with CHF CAD lung cancer currently on hospice presents by EMS from home.  He states his grandson called EMS.  He was showering and slipped and fell and could not get back up.  So EMS was called.  Patient thinks his oxygen saturation was low at home.  He is not exactly sure why he is here.  He does not know which hospice company he uses and no family are with him at this time.  He has no complaints.        Review of patient's allergies indicates:   Allergen Reactions    Atorvastatin      Other reaction(s): Rash     Past Medical History:   Diagnosis Date    Adenomatous colon polyp 01/28/2011    Adenomatous colon polyp     Anxiety     Atrial fibrillation     Blood transfusion     Centrilobular emphysema 10/16/2023    CHF (congestive heart failure)     Coronary artery disease     Emphysema of lung     Hypertension     Myocardial infarction     Renal disorder     kidney stones    Respiratory failure     S/P coronary artery stent placement 01/07/2013    Staph skin infection     abd/ lanced x 2    Ulcer      Past Surgical History:   Procedure Laterality Date    COLONOSCOPY  01/28/2011    Dr Choudhary, tubular adenoma    CORONARY ARTERY BYPASS GRAFT  2005    CORONARY STENT PLACEMENT      ENDOBRONCHIAL ULTRASOUND N/A 7/18/2022    Procedure: ENDOBRONCHIAL ULTRASOUND (EBUS);  Surgeon: Alli Sandra MD;  Location: [facility];  Service: Pulmonary;  Laterality: N/A;    NAVIGATIONAL BRONCHOSCOPY Bilateral 7/18/2022    Procedure: BRONCHOSCOPY, NAVIGATIONAL;  Surgeon: Alli Sandra MD;  Location: [facility];  Service: Pulmonary;  Laterality: Bilateral;     Family History   Problem Relation Name Age of Onset    Heart disease Mother      Heart disease Father      Diabetes Father      Heart disease Maternal Aunt       No Known Problems Sister      No Known Problems Brother      No Known Problems Daughter      No Known Problems Son       Social History[1]  Review of Systems   Constitutional:  Positive for fatigue.   Respiratory: Negative.     Cardiovascular: Negative.    Gastrointestinal: Negative.    Neurological:  Positive for weakness (global).       Physical Exam     Initial Vitals [04/04/25 1553]   BP Pulse Resp Temp SpO2   103/63 90 (!) 24 98.3 °F (36.8 °C) (!) 93 %      MAP       --         Physical Exam    Nursing note and vitals reviewed.  Constitutional: He appears well-developed and well-nourished. He is not diaphoretic.  Non-toxic appearance. He does not have a sickly appearance. He does not appear ill. No distress.   Chronically ill-appearing but no acute distress   HENT:   Head: Normocephalic and atraumatic.   Eyes: EOM are normal.   Neck: Neck supple.   Normal range of motion.  Cardiovascular:  Normal rate, regular rhythm and normal heart sounds.     Exam reveals no gallop and no friction rub.       No murmur heard.  Pulmonary/Chest: Breath sounds normal. No respiratory distress. He has no wheezes. He has no rhonchi. He has no rales.   Abdominal: He exhibits no distension. There is no abdominal tenderness.   Musculoskeletal:         General: Normal range of motion.      Cervical back: Normal range of motion and neck supple. No rigidity. Normal range of motion.      Comments: Full range of motion all major joints     Neurological: He is alert and oriented to person, place, and time.   Skin: Skin is warm and dry. No rash noted.   Psychiatric: He has a normal mood and affect. His behavior is normal. Judgment and thought content normal.         ED Course   Procedures  Labs Reviewed - No data to display       Imaging Results    None          Medications - No data to display  Medical Decision Making  69-year-old male with lung cancer on hospice presents to the emergency department after a fall at home.  He states his  keely had trouble picking him up and so they called EMS.  EMS brought him to the hospital.  He denies any specific complaints and states he wants to go back home.  He would like to remain on hospice.  Family is at the bedside.  They agree with this plan.  No one desires any ER evaluation.  He does not really need any labs or imaging in my opinion as he fell and did not strike his head and has no new complaints.  He simply came to the ER it sounds like because the family could not pick him up safely and so EMS thought it would be better if he came to the hospital.  I spoke with the hospice company to let them know he would be discharged home.    Amount and/or Complexity of Data Reviewed  Independent Historian: caregiver  External Data Reviewed: notes.               ED Course as of 04/04/25 1805 Fri Apr 04, 2025   1621 BP: 103/63 [EF]   1621 Temp: 98.3 °F (36.8 °C) [EF]   1621 Pulse: 90 [EF]   1621 Resp(!): 24 [EF]   1621 SpO2(!): 93 % [EF]   1737 Family at bedside now.  Patient wants to remain on hospice.  Family wants the patient to remain on hospice.  They do not want any ER treatment.  Sounds like keely could not lift him and called EMS and EMS recommended he come to the hospital.  I called the hospice company to let them know he would like to remain on hospice.  I spoke with Teena with [facility].  [phone]. [EF]      ED Course User Index  [EF] Genaro Dang MD                           Clinical Impression:  Final diagnoses:  [W19.XXXA] Fall, initial encounter (Primary)          ED Disposition Condition    Discharge Stable          ED Prescriptions    None       Follow-up Information       Follow up With Specialties Details Why Contact Info    Carlo Ceballos PA-C Family Medicine  As needed [address] [facility] [id number] [phone]                 [1]   Social History  Tobacco Use    Smoking status: Every Day     Current packs/day: 1.00     Average packs/day: 0.8 packs/day for  48.8 years (38.3 ttl pk-yrs)     Types: Cigarettes     Start date: 5/31/1976    Smokeless tobacco: Never    Tobacco comments:     Pt currently smoking 1ppd. Ready to quit, has a patch on while admitted for cravings. Has made multiple attempts to quit and has started again.  Pt is a [employer] Member: [id number]     Pt smokes 1 pack a day 10/02/2024          1 pk qd   Substance Use Topics    Alcohol use: No     Alcohol/week: 0.0 standard drinks of alcohol    Drug use: No        Genaro Dang MD  04/04/25 0586

## 2025-04-04 NOTE — ED NOTES
Bed: 29  Expected date:   Expected time:   Means of arrival:   Comments:  RM 4   Pt resting in bed apparently asleep with easy even respirations at HS q 15 min electronic rounding.

## 2025-04-05 NOTE — ED NOTES
Patients O2 sats found to be around 75% on 4 L. Put patient on 5 L. O2 sats raise to 84%. Notified Dr. Zepeda ED MD due to concern for patients O2 sat.

## 2025-04-05 NOTE — ED NOTES
Akash here to transport patient home. Spoke with patients wife regarding discharge home and that patient was being transported home.

## 2025-04-05 NOTE — ED NOTES
Spoke with patients wife and hospice nurse Teena. Both state that patient is heavy smoker with lung cancer. Teena the patients hospice nurse states that he normally is cyanotic and oxygen saturation is around 84-90%. Patient remains on oxygen at 5 L. Teena states that she will have a nurse out for the next visit with hospice tomorrow.